# Patient Record
Sex: MALE | Race: WHITE | NOT HISPANIC OR LATINO | Employment: FULL TIME | URBAN - METROPOLITAN AREA
[De-identification: names, ages, dates, MRNs, and addresses within clinical notes are randomized per-mention and may not be internally consistent; named-entity substitution may affect disease eponyms.]

---

## 2017-04-17 DIAGNOSIS — E11.9 TYPE 2 DIABETES MELLITUS WITHOUT COMPLICATIONS (HCC): ICD-10-CM

## 2017-04-17 DIAGNOSIS — Z00.00 ENCOUNTER FOR GENERAL ADULT MEDICAL EXAMINATION WITHOUT ABNORMAL FINDINGS: ICD-10-CM

## 2018-01-12 NOTE — MISCELLANEOUS
Provider Comments  Provider Comments:   Our records indicate that you missed an appointment on 10/31/2016  If you have not done so already, please call our office and we will be happy to schedule another appointment for you  If you must cancel your appointment, our office policy requires you to call our office at least 24 hours in advance so that we may utilize your appointment time for another patient who requires our services  If you have any questions, please contact our office at [phone]  Signatures   Electronically signed by :  Pedro Moran, ; Nov 1 2016 12:13PM EST                       (Author)

## 2018-04-21 DIAGNOSIS — E11.9 TYPE 2 DIABETES MELLITUS WITHOUT COMPLICATION, WITHOUT LONG-TERM CURRENT USE OF INSULIN (HCC): Primary | ICD-10-CM

## 2018-07-10 DIAGNOSIS — E11.9 TYPE 2 DIABETES MELLITUS WITHOUT COMPLICATION, WITHOUT LONG-TERM CURRENT USE OF INSULIN (HCC): ICD-10-CM

## 2018-07-23 ENCOUNTER — OFFICE VISIT (OUTPATIENT)
Dept: FAMILY MEDICINE CLINIC | Facility: CLINIC | Age: 43
End: 2018-07-23
Payer: COMMERCIAL

## 2018-07-23 VITALS
SYSTOLIC BLOOD PRESSURE: 130 MMHG | HEART RATE: 80 BPM | WEIGHT: 194 LBS | RESPIRATION RATE: 16 BRPM | DIASTOLIC BLOOD PRESSURE: 82 MMHG | BODY MASS INDEX: 29 KG/M2

## 2018-07-23 DIAGNOSIS — E11.9 TYPE 2 DIABETES MELLITUS WITHOUT COMPLICATION, WITHOUT LONG-TERM CURRENT USE OF INSULIN (HCC): Primary | Chronic | ICD-10-CM

## 2018-07-23 DIAGNOSIS — Z72.0 TOBACCO ABUSE: Chronic | ICD-10-CM

## 2018-07-23 PROCEDURE — 99214 OFFICE O/P EST MOD 30 MIN: CPT | Performed by: FAMILY MEDICINE

## 2018-07-23 NOTE — PROGRESS NOTES
Assessment/Plan:    No problem-specific Assessment & Plan notes found for this encounter  Diagnoses and all orders for this visit:    Type 2 diabetes mellitus without complication, without long-term current use of insulin ([facility] [address] )  Comments:  Staci Goins is to get back to a low carb diet & regular exercise, and is f/u regularly (long discussion today)  Will see where A1c comes back at before adjusting meds  Orders:  -     Comprehensive metabolic panel; Future  -     Lipid Panel with Direct LDL reflex; Future  -     HEMOGLOBIN A1C W/ EAG ESTIMATION; Future  -     Microalbumin / creatinine urine ratio  -     CBC and differential; Future    Tobacco abuse  Comments:  Pt has really cut down, but was encouraged to completely stop  Discussed options available to help quit smoking  Subjective:      Patient ID: Lisbeth Mariscal is a 40 y o  male  Staci Goins has been a diabetic now for over 2 years  He unfortunately, has not been back to see us, in almost 2 years  Had a blood sugar in clinic of 499 -> was drinking Gatorade and Ice Tea today  Is taking his Metformin regularly  Not really watching his diet -> trying, as wife is working on her diet too  Has greatly cut back on his smoking  +FamHx - Father and PGM            The following portions of the patient's history were reviewed and updated as appropriate: allergies, current medications, past family history, past social history, past surgical history and problem list     Past Medical History:   Diagnosis Date    Diabetes mellitus ([facility] [address] )        Current Outpatient Prescriptions:     metFORMIN (GLUCOPHAGE) 500 mg tablet, Take 1 tablet (500 mg total) by mouth 2 (two) times a day with meals for 90 days, Disp: 180 tablet, Rfl: 1    No Known Allergies    Social History   Substance Use Topics    Smoking status: Current Every Day Smoker     Packs/day: 0 50     Types: Cigarettes    Smokeless tobacco: Not on file    Alcohol use No      Comment: [employer]--Social Alcohol Review of Systems   Constitutional: Positive for fatigue  Negative for activity change  Eyes: Negative for visual disturbance  Respiratory: Negative for shortness of breath  Cardiovascular: Negative for chest pain  Endocrine: Positive for polyuria  Symptoms not bad when taking his medicine regularly  Objective:      /82 (BP Location: Left arm, Patient Position: Sitting, Cuff Size: Standard)   Pulse 80   Resp 16   Wt 88 kg (194 lb)   BMI 29 00 kg/m²          Physical Exam   Constitutional: He is oriented to person, place, and time  He appears well-developed and well-nourished  No distress  HENT:   Head: Normocephalic and atraumatic  Mouth/Throat: Oropharynx is clear and moist  No oropharyngeal exudate  Eyes: Conjunctivae are normal    Neck: Normal range of motion  Neck supple  No thyromegaly present  Cardiovascular: Normal rate, regular rhythm and normal heart sounds  Exam reveals no gallop and no friction rub  No murmur heard  Pulmonary/Chest: Effort normal and breath sounds normal  No respiratory distress  He has no wheezes  He has no rales  Lymphadenopathy:     He has no cervical adenopathy  Neurological: He is alert and oriented to person, place, and time  Skin: He is not diaphoretic  Psychiatric: He has a normal mood and affect  His behavior is normal  Judgment and thought content normal    Nursing note and vitals reviewed

## 2018-07-28 ENCOUNTER — APPOINTMENT (OUTPATIENT)
Dept: LAB | Facility: CLINIC | Age: 43
End: 2018-07-28
Payer: COMMERCIAL

## 2018-07-28 DIAGNOSIS — E11.9 TYPE 2 DIABETES MELLITUS WITHOUT COMPLICATION, WITHOUT LONG-TERM CURRENT USE OF INSULIN (HCC): Primary | Chronic | ICD-10-CM

## 2018-07-28 DIAGNOSIS — E11.9 TYPE 2 DIABETES MELLITUS WITHOUT COMPLICATION, WITHOUT LONG-TERM CURRENT USE OF INSULIN (HCC): Chronic | ICD-10-CM

## 2018-07-28 DIAGNOSIS — E78.2 MIXED HYPERLIPIDEMIA: Chronic | ICD-10-CM

## 2018-07-28 LAB
ALBUMIN SERPL BCP-MCNC: 3.6 G/DL (ref 3.5–5)
ALP SERPL-CCNC: 81 U/L (ref 46–116)
ALT SERPL W P-5'-P-CCNC: 33 U/L (ref 12–78)
ANION GAP SERPL CALCULATED.3IONS-SCNC: 5 MMOL/L (ref 4–13)
AST SERPL W P-5'-P-CCNC: 13 U/L (ref 5–45)
BASOPHILS # BLD AUTO: 0.04 THOUSANDS/ΜL (ref 0–0.1)
BASOPHILS NFR BLD AUTO: 1 % (ref 0–1)
BILIRUB SERPL-MCNC: 0.4 MG/DL (ref 0.2–1)
BUN SERPL-MCNC: 11 MG/DL (ref 5–25)
CALCIUM SERPL-MCNC: 9 MG/DL (ref 8.3–10.1)
CHLORIDE SERPL-SCNC: 98 MMOL/L (ref 100–108)
CHOLEST SERPL-MCNC: 304 MG/DL (ref 50–200)
CO2 SERPL-SCNC: 30 MMOL/L (ref 21–32)
CREAT SERPL-MCNC: 0.84 MG/DL (ref 0.6–1.3)
CREAT UR-MCNC: 136 MG/DL
EOSINOPHIL # BLD AUTO: 0.13 THOUSAND/ΜL (ref 0–0.61)
EOSINOPHIL NFR BLD AUTO: 2 % (ref 0–6)
ERYTHROCYTE [DISTWIDTH] IN BLOOD BY AUTOMATED COUNT: 12 % (ref 11.6–15.1)
EST. AVERAGE GLUCOSE BLD GHB EST-MCNC: 295 MG/DL
GFR SERPL CREATININE-BSD FRML MDRD: 109 ML/MIN/1.73SQ M
GLUCOSE P FAST SERPL-MCNC: 248 MG/DL (ref 65–99)
HBA1C MFR BLD: 11.9 % (ref 4.2–6.3)
HCT VFR BLD AUTO: 42.7 % (ref 36.5–49.3)
HDLC SERPL-MCNC: 50 MG/DL (ref 40–60)
HGB BLD-MCNC: 15.8 G/DL (ref 12–17)
LDLC SERPL CALC-MCNC: 209 MG/DL (ref 0–100)
LYMPHOCYTES # BLD AUTO: 1.93 THOUSANDS/ΜL (ref 0.6–4.47)
LYMPHOCYTES NFR BLD AUTO: 35 % (ref 14–44)
MCH RBC QN AUTO: 31.3 PG (ref 26.8–34.3)
MCHC RBC AUTO-ENTMCNC: 37 G/DL (ref 31.4–37.4)
MCV RBC AUTO: 85 FL (ref 82–98)
MICROALBUMIN UR-MCNC: 10.7 MG/L (ref 0–20)
MICROALBUMIN/CREAT 24H UR: 8 MG/G CREATININE (ref 0–30)
MONOCYTES # BLD AUTO: 0.47 THOUSAND/ΜL (ref 0.17–1.22)
MONOCYTES NFR BLD AUTO: 8 % (ref 4–12)
NEUTROPHILS # BLD AUTO: 3 THOUSANDS/ΜL (ref 1.85–7.62)
NEUTS SEG NFR BLD AUTO: 54 % (ref 43–75)
PLATELET # BLD AUTO: 248 THOUSANDS/UL (ref 149–390)
PMV BLD AUTO: 10 FL (ref 8.9–12.7)
POTASSIUM SERPL-SCNC: 4.1 MMOL/L (ref 3.5–5.3)
PROT SERPL-MCNC: 7.5 G/DL (ref 6.4–8.2)
RBC # BLD AUTO: 5.05 MILLION/UL (ref 3.88–5.62)
SODIUM SERPL-SCNC: 133 MMOL/L (ref 136–145)
TRIGL SERPL-MCNC: 227 MG/DL
WBC # BLD AUTO: 5.57 THOUSAND/UL (ref 4.31–10.16)

## 2018-07-28 PROCEDURE — 36415 COLL VENOUS BLD VENIPUNCTURE: CPT

## 2018-07-28 PROCEDURE — 82043 UR ALBUMIN QUANTITATIVE: CPT | Performed by: FAMILY MEDICINE

## 2018-07-28 PROCEDURE — 80053 COMPREHEN METABOLIC PANEL: CPT

## 2018-07-28 PROCEDURE — 82570 ASSAY OF URINE CREATININE: CPT | Performed by: FAMILY MEDICINE

## 2018-07-28 PROCEDURE — 83036 HEMOGLOBIN GLYCOSYLATED A1C: CPT

## 2018-07-28 PROCEDURE — 80061 LIPID PANEL: CPT

## 2018-07-28 PROCEDURE — 85025 COMPLETE CBC W/AUTO DIFF WBC: CPT

## 2018-07-28 PROCEDURE — 3061F NEG MICROALBUMINURIA REV: CPT | Performed by: FAMILY MEDICINE

## 2018-07-28 RX ORDER — ATORVASTATIN CALCIUM 10 MG/1
10 TABLET, FILM COATED ORAL
Qty: 90 TABLET | Refills: 1 | Status: SHIPPED | OUTPATIENT
Start: 2018-07-28 | End: 2019-01-17 | Stop reason: SDUPTHER

## 2019-01-15 ENCOUNTER — HOSPITAL ENCOUNTER (EMERGENCY)
Facility: HOSPITAL | Age: 44
Discharge: HOME | End: 2019-01-15
Attending: EMERGENCY MEDICINE | Admitting: EMERGENCY MEDICINE
Payer: COMMERCIAL

## 2019-01-15 ENCOUNTER — APPOINTMENT (EMERGENCY)
Dept: RADIOLOGY | Facility: HOSPITAL | Age: 44
End: 2019-01-15
Payer: COMMERCIAL

## 2019-01-15 VITALS
OXYGEN SATURATION: 98 % | SYSTOLIC BLOOD PRESSURE: 127 MMHG | WEIGHT: 191 LBS | TEMPERATURE: 97.2 F | RESPIRATION RATE: 18 BRPM | HEART RATE: 85 BPM | DIASTOLIC BLOOD PRESSURE: 81 MMHG | HEIGHT: 69 IN | BODY MASS INDEX: 28.29 KG/M2

## 2019-01-15 DIAGNOSIS — IMO0002 UNCONTROLLED DIABETES MELLITUS: Primary | ICD-10-CM

## 2019-01-15 DIAGNOSIS — R73.9 HYPERGLYCEMIA: ICD-10-CM

## 2019-01-15 DIAGNOSIS — H60.01 ABSCESS OF EXTERNAL EAR, RIGHT: ICD-10-CM

## 2019-01-15 LAB
ALBUMIN SERPL BCP-MCNC: 4.1 G/DL (ref 3.5–5)
ALP SERPL-CCNC: 106 U/L (ref 46–116)
ALT SERPL W P-5'-P-CCNC: 32 U/L (ref 12–78)
ANION GAP SERPL CALCULATED.3IONS-SCNC: 8 MMOL/L (ref 4–13)
AST SERPL W P-5'-P-CCNC: 14 U/L (ref 5–45)
BACTERIA UR QL AUTO: NORMAL /HPF
BASOPHILS # BLD AUTO: 0.04 THOUSANDS/ΜL (ref 0–0.1)
BASOPHILS NFR BLD AUTO: 1 % (ref 0–1)
BILIRUB SERPL-MCNC: 0.37 MG/DL (ref 0.2–1)
BILIRUB UR QL STRIP: NEGATIVE
BUN SERPL-MCNC: 11 MG/DL (ref 5–25)
CALCIUM SERPL-MCNC: 9.7 MG/DL (ref 8.3–10.1)
CHLORIDE SERPL-SCNC: 93 MMOL/L (ref 100–108)
CLARITY UR: CLEAR
CLARITY, POC: CLEAR
CO2 SERPL-SCNC: 29 MMOL/L (ref 21–32)
COLOR UR: YELLOW
COLOR, POC: YELLOW
CREAT SERPL-MCNC: 1.01 MG/DL (ref 0.6–1.3)
EOSINOPHIL # BLD AUTO: 0.07 THOUSAND/ΜL (ref 0–0.61)
EOSINOPHIL NFR BLD AUTO: 1 % (ref 0–6)
ERYTHROCYTE [DISTWIDTH] IN BLOOD BY AUTOMATED COUNT: 11.7 % (ref 11.6–15.1)
GFR SERPL CREATININE-BSD FRML MDRD: 91 ML/MIN/1.73SQ M
GLUCOSE SERPL-MCNC: 274 MG/DL (ref 65–140)
GLUCOSE SERPL-MCNC: 421 MG/DL (ref 65–140)
GLUCOSE UR STRIP-MCNC: ABNORMAL MG/DL
HCT VFR BLD AUTO: 48.1 % (ref 36.5–49.3)
HGB BLD-MCNC: 17.2 G/DL (ref 12–17)
HGB UR QL STRIP.AUTO: NEGATIVE
HYALINE CASTS #/AREA URNS LPF: NORMAL /LPF
IMM GRANULOCYTES # BLD AUTO: 0 THOUSAND/UL (ref 0–0.2)
IMM GRANULOCYTES NFR BLD AUTO: 0 % (ref 0–2)
KETONES UR STRIP-MCNC: NEGATIVE MG/DL
LEUKOCYTE ESTERASE UR QL STRIP: ABNORMAL
LIPASE SERPL-CCNC: 106 U/L (ref 73–393)
LYMPHOCYTES # BLD AUTO: 1.82 THOUSANDS/ΜL (ref 0.6–4.47)
LYMPHOCYTES NFR BLD AUTO: 30 % (ref 14–44)
MCH RBC QN AUTO: 31.3 PG (ref 26.8–34.3)
MCHC RBC AUTO-ENTMCNC: 35.8 G/DL (ref 31.4–37.4)
MCV RBC AUTO: 88 FL (ref 82–98)
MONOCYTES # BLD AUTO: 0.47 THOUSAND/ΜL (ref 0.17–1.22)
MONOCYTES NFR BLD AUTO: 8 % (ref 4–12)
NEUTROPHILS # BLD AUTO: 3.71 THOUSANDS/ΜL (ref 1.85–7.62)
NEUTS SEG NFR BLD AUTO: 60 % (ref 43–75)
NITRITE UR QL STRIP: NEGATIVE
NON-SQ EPI CELLS URNS QL MICRO: NORMAL /HPF
NRBC BLD AUTO-RTO: 0 /100 WBCS
PH UR STRIP.AUTO: 6 [PH] (ref 4.5–8)
PLATELET # BLD AUTO: 271 THOUSANDS/UL (ref 149–390)
PMV BLD AUTO: 10.2 FL (ref 8.9–12.7)
POTASSIUM SERPL-SCNC: 4.7 MMOL/L (ref 3.5–5.3)
PROT SERPL-MCNC: 8.3 G/DL (ref 6.4–8.2)
PROT UR STRIP-MCNC: NEGATIVE MG/DL
RBC # BLD AUTO: 5.5 MILLION/UL (ref 3.88–5.62)
RBC #/AREA URNS AUTO: NORMAL /HPF
SODIUM SERPL-SCNC: 130 MMOL/L (ref 136–145)
SP GR UR STRIP.AUTO: 1.01 (ref 1–1.03)
TROPONIN I SERPL-MCNC: <0.02 NG/ML
UROBILINOGEN UR QL STRIP.AUTO: 0.2 E.U./DL
WBC # BLD AUTO: 6.11 THOUSAND/UL (ref 4.31–10.16)
WBC #/AREA URNS AUTO: NORMAL /HPF

## 2019-01-15 PROCEDURE — 99285 EMERGENCY DEPT VISIT HI MDM: CPT

## 2019-01-15 PROCEDURE — 96374 THER/PROPH/DIAG INJ IV PUSH: CPT

## 2019-01-15 PROCEDURE — 96361 HYDRATE IV INFUSION ADD-ON: CPT

## 2019-01-15 PROCEDURE — 71046 X-RAY EXAM CHEST 2 VIEWS: CPT

## 2019-01-15 PROCEDURE — 82948 REAGENT STRIP/BLOOD GLUCOSE: CPT

## 2019-01-15 PROCEDURE — 81001 URINALYSIS AUTO W/SCOPE: CPT

## 2019-01-15 PROCEDURE — 83690 ASSAY OF LIPASE: CPT | Performed by: EMERGENCY MEDICINE

## 2019-01-15 PROCEDURE — 84484 ASSAY OF TROPONIN QUANT: CPT | Performed by: EMERGENCY MEDICINE

## 2019-01-15 PROCEDURE — 36415 COLL VENOUS BLD VENIPUNCTURE: CPT | Performed by: EMERGENCY MEDICINE

## 2019-01-15 PROCEDURE — 85025 COMPLETE CBC W/AUTO DIFF WBC: CPT | Performed by: EMERGENCY MEDICINE

## 2019-01-15 PROCEDURE — 80053 COMPREHEN METABOLIC PANEL: CPT | Performed by: EMERGENCY MEDICINE

## 2019-01-15 RX ORDER — KETOROLAC TROMETHAMINE 30 MG/ML
15 INJECTION, SOLUTION INTRAMUSCULAR; INTRAVENOUS ONCE
Status: COMPLETED | OUTPATIENT
Start: 2019-01-15 | End: 2019-01-15

## 2019-01-15 RX ORDER — ONDANSETRON 2 MG/ML
4 INJECTION INTRAMUSCULAR; INTRAVENOUS ONCE
Status: DISCONTINUED | OUTPATIENT
Start: 2019-01-15 | End: 2019-01-15 | Stop reason: HOSPADM

## 2019-01-15 RX ORDER — SULFAMETHOXAZOLE AND TRIMETHOPRIM 800; 160 MG/1; MG/1
1 TABLET ORAL 2 TIMES DAILY
Qty: 14 TABLET | Refills: 0 | Status: SHIPPED | OUTPATIENT
Start: 2019-01-15 | End: 2019-01-22

## 2019-01-15 RX ORDER — LIDOCAINE HYDROCHLORIDE AND EPINEPHRINE 10; 10 MG/ML; UG/ML
5 INJECTION, SOLUTION INFILTRATION; PERINEURAL ONCE
Status: COMPLETED | OUTPATIENT
Start: 2019-01-15 | End: 2019-01-15

## 2019-01-15 RX ADMIN — SODIUM CHLORIDE 2000 ML: 0.9 INJECTION, SOLUTION INTRAVENOUS at 09:54

## 2019-01-15 RX ADMIN — KETOROLAC TROMETHAMINE 15 MG: 30 INJECTION, SOLUTION INTRAMUSCULAR at 09:55

## 2019-01-15 RX ADMIN — LIDOCAINE HYDROCHLORIDE,EPINEPHRINE BITARTRATE 5 ML: 10; .01 INJECTION, SOLUTION INFILTRATION; PERINEURAL at 13:00

## 2019-01-15 NOTE — ED NOTES
Patient's ex girlfriend concerned about patient's mental health status, stating that he has been expressing thoughts of suicide recently  Dr Benny Taylor aware       Crispin Guo, RN  01/15/19 [address] [place] [address], RN  01/15/19 2606

## 2019-01-15 NOTE — ED ATTENDING ATTESTATION
Farnaz Morocho MD, saw and evaluated the patient  I have discussed the patient with the resident/non-physician practitioner and agree with the resident's/non-physician practitioner's findings, Plan of Care, and MDM as documented in the resident's/non-physician practitioner's note, except where noted  All available labs and Radiology studies were reviewed  At this point I agree with the current assessment done in the Emergency Department  I have conducted an independent evaluation of this patient a history and physical is as follows:    Patient presents the emergency department with a chief complaint of 3 days of generalized weakness  The patient reports that over that time  He has had felt generally tired, fatigued, and weak  The patient also admits to mild lightheadedness  The patient reports he does have urinary frequency but it has not changed recently  The patient reports that he has had urinary frequency ever since he was diagnosed being diabetic  The patient reports that he did have mild nausea this morning and he retched once or twice but has had no episodes of vomiting  The patient had a normal day of work yesterday but he did feel more tired than usual   The patient denies any chest pain  The patient denies back pain or chest heaviness  There has been no neck or jaw pain  The patient denies arm pain  The patient reports that he has had a tender area inside his right ear for the last 1-2 days  The patient denies any fevers  The patient does not take his blood sugar at home and is inconsistent in taking his oral hypoglycemic  Physical exam demonstrates a pleasant alert nontoxic male in no acute distress  HEENT exam demonstrated a small slightly fluctuant area at the entrance to the right external auditory canal   The ear was otherwise normal   There is no surrounding erythema or warmth  There is no tenderness to the pinna    The left ear was normal   Eyes ears nose and throat were normal except for dry oral mucosa  The neck was supple nontender  Lungs are clear with equal breath sounds  The heart had a regular rate rhythm  The abdomen is soft and nontender  All extremities are nontender with a full range of motion  There is no focal neurologic deficit      Critical Care Time  CritCare Time    Procedures

## 2019-01-15 NOTE — DISCHARGE INSTRUCTIONS
Abscess   WHAT YOU NEED TO KNOW:   A warm compress may help your abscess drain  Your healthcare provider may make a cut in the abscess so it can drain  You may need surgery to remove an abscess that is on your hands or buttocks  DISCHARGE INSTRUCTIONS:   Return to the emergency department if:   · The area around your abscess becomes very painful, warm, or has red streaks  · You have a fever and chills  · Your heart is beating faster than usual      · You feel faint or confused  Contact your healthcare provider if:   · Your abscess gets bigger or does not get better  · Your abscess returns  · You have questions or concerns about your condition or care  Medicines: You may  need any of the following:  · Antibiotics  help treat a bacterial infection  · Acetaminophen  decreases pain and fever  It is available without a doctor's order  Ask how much to take and how often to take it  Follow directions  Acetaminophen can cause liver damage if not taken correctly  · NSAIDs , such as ibuprofen, help decrease swelling, pain, and fever  This medicine is available with or without a doctor's order  NSAIDs can cause stomach bleeding or kidney problems in certain people  If you take blood thinner medicine, always ask your healthcare provider if NSAIDs are safe for you  Always read the medicine label and follow directions  · Take your medicine as directed  Contact your healthcare provider if you think your medicine is not helping or if you have side effects  Tell him or her if you are allergic to any medicine  Keep a list of the medicines, vitamins, and herbs you take  Include the amounts, and when and why you take them  Bring the list or the pill bottles to follow-up visits  Carry your medicine list with you in case of an emergency  Self-care:   · Apply a warm compress to your abscess  This will help it open and drain  Wet a washcloth in warm, but not hot, water  Apply the compress for 10 minutes  Repeat this 4 times each day  Do not  press on an abscess or try to open it with a needle  You may push the bacteria deeper or into your blood  · Do not share your clothes, towels, or sheets with anyone  This can spread the infection to others  · Wash your hands often  This can help prevent the spread of germs  Use soap and water or an alcohol-based hand rub  Care for your wound after it is drained:   · Care for your wound as directed  If your healthcare provider says it is okay, carefully remove the bandage and gauze packing  You may need to soak the gauze to get it out of your wound  Clean your wound and the area around it as directed  Dry the area and put on new, clean bandages  Change your bandages when they get wet or dirty  · Ask your healthcare provider how to change the gauze in your wound  Keep track of how many pieces of gauze are placed inside the wound  Do not put too much packing in the wound  Do not pack the gauze too tightly in your wound  Follow up with your healthcare provider in 1 to 3 days: You may need to have your packing removed or your bandage changed  Write down your questions so you remember to ask them during your visits  © 2017 [address] [facility] Information is for End User's use only and may not be sold, redistributed or otherwise used for commercial purposes  All illustrations and images included in CareNotes® are the copyrighted property of [employer]  or [employer]  The above information is an  only  It is not intended as medical advice for individual conditions or treatments  Talk to your doctor, nurse or pharmacist before following any medical regimen to see if it is safe and effective for you  Diabetes in the Older Adult   WHAT YOU NEED TO KNOW:   Older adults with diabetes are at risk for heart disease, stroke, kidney disease, blindness, and nerve damage   You may also be at risk for any of the following:  · Poor nutrition or low blood sugar levels    · Confusion or problems with memory, attention, or learning new things    · Trouble controlling urination or frequent urinary tract infections    · Trouble with coordination or balance    · Falls and injuries    · Pain    · Depression    · Open sores on your legs or feet  DISCHARGE INSTRUCTIONS:   Call 911 for any of the following:   · You have any of the following signs of a stroke:      ¨ Numbness or drooping on one side of your face     ¨ Weakness in an arm or leg    ¨ Confusion or difficulty speaking    ¨ Dizziness, a severe headache, or vision loss    · You have any of the following signs of a heart attack:      ¨ Squeezing, pressure, or pain in your chest that lasts longer than 5 minutes or returns    ¨ Discomfort or pain in your back, neck, jaw, stomach, or arm     ¨ Trouble breathing    ¨ Nausea or vomiting    ¨ Lightheadedness or a sudden cold sweat, especially with chest pain or trouble breathing  Return to the emergency department if:   · You have severe abdominal pain, or the pain spreads to your back  You may also be vomiting  · You have trouble staying awake or focusing  · You are shaking or sweating  · You have blurred or double vision  · Your breath has a fruity, sweet smell  · Your breathing is deep and labored, or rapid and shallow  · Your heartbeat is fast and weak  · You fall and get hurt  Contact your healthcare provider if:   · You are vomiting or have diarrhea  · You have an upset stomach and cannot eat the foods on your meal plan  · You feel weak or more tired than usual      · You feel dizzy, have headaches, or are easily irritated  · Your skin is red, warm, dry, or swollen  · You have a wound that does not heal      · You have numbness in your arms or legs  · You have trouble coping with your illness, or you feel anxious or depressed  · You have problems with your memory       · You have changes in your vision  · You have questions or concerns about your condition or care  Medicines  may be given to decrease the amount of sugar in your blood  You may also need medicine to lower your blood pressure or cholesterol, or medicine to prevent blood clots  Manage the ABCs and prevent problems caused by diabetes:   · Check your blood sugar levels as directed  Your healthcare provider will tell you when and how often to check during the day  Your healthcare provider will also tell you what your blood sugar levels should be before and after a meal  You may need to check for ketones in your urine or blood if your level is higher than directed  Write down your results and show them to your healthcare provider  Your provider may use the results to make changes to your medicine, food, or exercise schedules  Ask your healthcare provider for more information about how to treat a high or low blood sugar level  · Follow your meal plan as directed  A dietitian will help you make a meal plan to keep your blood sugar level steady and make sure you get enough nutrition  Do not skip meals  Your blood sugar level may drop too low if you have taken diabetes medicine and do not eat  Ask your healthcare provider about programs in your community that can deliver the meals to your home  · Try to be active for 30 to 60 minutes most days of the week  Exercise can help keep your blood sugar level steady, decrease your risk of heart disease, and help you lose weight  It can also help improve your balance and decrease your risk for falls  Work with your healthcare provider to create an exercise plan  Ask a family member or friend to exercise with you  Start slow and exercise for 5 to 10 minutes at a time  Examples of activities include walking or swimming  Include muscle strengthening activities 2 to 3 days each week  Include balance training 2 to 3 times each week   Activities that help increase balance include yoga and wilfredo chi      · Maintain a healthy weight  Ask your healthcare provider how much you should weigh  A healthy weight can help you control your diabetes and prevent heart disease  Ask your provider to help you create a weight loss plan if you are overweight  Together you can set manageable weight loss goals  · Do not smoke  Ask your healthcare provider for information if you currently smoke and need help to quit  Do not use e-cigarettes or smokeless tobacco in place of cigarettes or to help you quit  They still contain nicotine  · Manage stress  Stress may increase your blood sugar level  Deep breathing, muscle relaxation, and music may help you relax  Ask your healthcare provider for more information about these practices  Other ways to manage your diabetes:   · Check your feet every day for sores  Look at your whole foot, including the bottom, and between and under your toes  Check for wounds, corns, and calluses  Use a mirror to see the bottom of your feet  The skin on your feet may be shiny, tight, dry, or darker than normal  Your feet may also be cold and pale  Feel your feet by running your hands along the tops, bottoms, sides, and between your toes  Redness, swelling, and warmth are signs of blood flow problems that can lead to a foot ulcer  Do not try to remove corns or calluses yourself  · Wear medical alert identification  Wear medical alert jewelry or carry a card that says you have diabetes  Ask your healthcare provider where to get these items  · Ask about vaccines  You have a higher risk for serious illness if you get the flu, pneumonia, or hepatitis  Ask your healthcare provider if you should get a flu, pneumonia, shingles, or hepatitis B vaccine, and when to get the vaccine  · Keep all appointments  You may need to return to have your A1c checked every 3 months  You will need to return at least once each year to have your feet checked   You will need an eye exam once a year to check for retinopathy  You will also need urine tests every year to check for kidney problems  You may need tests to monitor for heart disease  Write down your questions so you remember to ask them during your visits  · Get help from family and friends  You may need help checking your blood sugar level, giving insulin injections, or preparing your meals  Ask your family and friends to help you with these tasks  Talk to your healthcare provider if you do not have someone at home to help you  A healthcare provider can come to your home to help you with these tasks  Follow up with your healthcare provider as directed: You may need to return to have your A1c checked every 3 months  You will need to return at least once each year to have your feet checked  You will need an eye exam once a year to check for retinopathy  You will also need urine tests every year to check for kidney problems  You may need tests to monitor for heart disease  Write down your questions so you remember to ask them during your visits  © 2017 [address] [employer] Information is for End User's use only and may not be sold, redistributed or otherwise used for commercial purposes  All illustrations and images included in CareNotes® are the copyrighted property of [employer] , [employer]  or [employer]  The above information is an  only  It is not intended as medical advice for individual conditions or treatments  Talk to your doctor, nurse or pharmacist before following any medical regimen to see if it is safe and effective for you

## 2019-01-15 NOTE — ED NOTES
I approached patient informing him of his ex girlfriends concern and patient denies wanting to hurt himself, stating he "has his son" and would never do anything  I let him know that we can help him if he would like to talk  Patient states, "no, i'm o k , just have a lot of stress right now"       Tl Baldwin RN  01/15/19 [id number]

## 2019-01-15 NOTE — ED PROVIDER NOTES
History  Chief Complaint   Patient presents with    Weakness - Generalized     Pt presents with generalized weakness, nausea, dry heaves, and R ear pain since " a couple days ago"  HPI    81-year-old male with history of non insulin dependent diabetes presents with chief complaint of weakness  Patient was at work  He felt nauseated  He had some dry heaves with scant emesis, nonbloody nonbilious  Patient states he feels weak  Patient states that this began a couple days ago  Has not checked his blood sugar  Patient admits to right ear pain  Swelling of his external ext story auditory canal   Patient denies drainage  Patient denies erythema  Pain is described as dull and achy  It is currently 5/10  Worse with palpation  No changing in hearing  Patient states this pain does not radiate  Patient states he takes metformin does not check his blood glucose  Patient admits to feeling very fatigued  Patient was able to walk from work to the hospital for evaluation  Patient denies fever chills rigors headache lightheadedness dizziness chest pain palpitations shortness of breath cough pleurisy abdominal pain urinary symptoms motor weakness numbness and tingling  Prior to Admission Medications   Prescriptions Last Dose Informant Patient Reported? Taking?   atorvastatin (LIPITOR) 10 mg tablet   No No   Sig: Take 1 tablet (10 mg total) by mouth daily at bedtime for 90 days   metFORMIN (GLUCOPHAGE) 1000 MG tablet   No No   Sig: Take 1 tablet (1,000 mg total) by mouth 2 (two) times a day with meals for 90 days   sitaGLIPtin (JANUVIA) 100 mg tablet   No No   Sig: Take 1 tablet (100 mg total) by mouth daily for 90 days      Facility-Administered Medications: None       Past Medical History:   Diagnosis Date    Diabetes mellitus ([facility] [address] )        History reviewed  No pertinent surgical history      Family History   Problem Relation Age of Onset    Hypertension Mother     Diabetes Father      I have reviewed and agree with the history as documented  Social History   Substance Use Topics    Smoking status: Current Every Day Smoker     Packs/day: 0 50     Types: Cigarettes    Smokeless tobacco: Not on file    Alcohol use No      Comment: [employer]--Social Alcohol        Review of Systems   Constitutional: Positive for fatigue  Negative for activity change, appetite change, chills, diaphoresis, fever and unexpected weight change  HENT: Positive for ear pain  Negative for congestion, dental problem, drooling, ear discharge, facial swelling, hearing loss, mouth sores, nosebleeds, postnasal drip, rhinorrhea, sinus pain, sinus pressure, sneezing, sore throat, tinnitus and trouble swallowing  Psychiatric/Behavioral: Negative for agitation, behavioral problems, confusion, decreased concentration, dysphoric mood, hallucinations, self-injury, sleep disturbance and suicidal ideas  The patient is not nervous/anxious and is not hyperactive  All other systems reviewed and are negative  Physical Exam  ED Triage Vitals [01/15/19 0857]   Temperature Pulse Respirations Blood Pressure SpO2   (!) 97 2 °F (36 2 °C) (!) 109 18 137/87 95 %      Temp Source Heart Rate Source Patient Position - Orthostatic VS BP Location FiO2 (%)   Oral Monitor Sitting Right arm --      Pain Score       8           Orthostatic Vital Signs  Vitals:    01/15/19 1119 01/15/19 1130 01/15/19 1245 01/15/19 1318   BP: 143/87 143/87 116/80 127/81   Pulse: 85 96 82 85   Patient Position - Orthostatic VS: Lying  Sitting Lying       Physical Exam   Constitutional: He is oriented to person, place, and time  He appears well-developed and well-nourished  No distress  HENT:   Head: Normocephalic and atraumatic  Right Ear: Hearing, tympanic membrane, external ear and ear canal normal  No lacerations  There is swelling (External auditory canal shows an area of 1 cm of induration with with mild central fluctuance    There is no erythema there is no lymphangitis noted no crepitation  Cartilage is nontender ) and tenderness  No drainage  No foreign bodies  No mastoid tenderness  Tympanic membrane is not injected, not scarred, not perforated, not erythematous, not retracted and not bulging  Tympanic membrane mobility is normal  No middle ear effusion  No hemotympanum  No decreased hearing is noted  Left Ear: Hearing, tympanic membrane, external ear and ear canal normal  No lacerations  No drainage, swelling or tenderness  No foreign bodies  No mastoid tenderness  Tympanic membrane is not injected, not scarred, not perforated, not erythematous, not retracted and not bulging  Tympanic membrane mobility is normal   No middle ear effusion  No hemotympanum  No decreased hearing is noted  Nose: Nose normal  No mucosal edema or rhinorrhea  Right sinus exhibits no maxillary sinus tenderness and no frontal sinus tenderness  Left sinus exhibits no maxillary sinus tenderness and no frontal sinus tenderness  Mouth/Throat: Oropharynx is clear and moist  No oropharyngeal exudate  Eyes: Pupils are equal, round, and reactive to light  Conjunctivae and EOM are normal  Right eye exhibits no discharge  Left eye exhibits no discharge  No scleral icterus  Neck: Normal range of motion  Neck supple  No JVD present  No tracheal deviation present  No thyromegaly present  Cardiovascular: Normal rate, regular rhythm, normal heart sounds and intact distal pulses  No murmur heard  Pulmonary/Chest: Effort normal and breath sounds normal  No stridor  No respiratory distress  He has no wheezes  He has no rales  Abdominal: Soft  Bowel sounds are normal  He exhibits no distension and no mass  There is no tenderness  There is no rebound and no guarding  Musculoskeletal: Normal range of motion  He exhibits no edema, tenderness or deformity  Lymphadenopathy:     He has no cervical adenopathy  Neurological: He is alert and oriented to person, place, and time  No cranial nerve deficit   He exhibits normal muscle tone  Coordination normal    Skin: Skin is warm and dry  No rash noted  He is not diaphoretic  No erythema  Psychiatric: He has a normal mood and affect  His behavior is normal  Judgment and thought content normal  His mood appears not anxious  His affect is not angry, not blunt, not labile and not inappropriate  His speech is not rapid and/or pressured, not delayed, not tangential and not slurred  He is not agitated, not aggressive, not hyperactive, not slowed, not withdrawn, not actively hallucinating and not combative  Thought content is not paranoid and not delusional  Cognition and memory are not impaired  He does not express impulsivity or inappropriate judgment  He does not exhibit a depressed mood  He expresses no homicidal and no suicidal ideation  He expresses no suicidal plans and no homicidal plans  He is communicative  He exhibits normal recent memory and normal remote memory  He is attentive  Nursing note and vitals reviewed        ED Medications  Medications   ketorolac (TORADOL) injection 15 mg (15 mg Intravenous Given 1/15/19 0955)   sodium chloride 0 9 % bolus 2,000 mL (0 mL Intravenous Stopped 1/15/19 1240)   lidocaine-epinephrine (XYLOCAINE/EPINEPHRINE) 1 %-1:100,000 injection 5 mL (5 mL Infiltration Given 1/15/19 1300)       Diagnostic Studies  Results Reviewed     Procedure Component Value Units Date/Time    Fingerstick Glucose (POCT) [[id number]]  (Abnormal) Collected:  01/15/19 1237    Lab Status:  Final result Updated:  01/15/19 1238     POC Glucose 274 (H) mg/dl     Urine Microscopic [[id number]]  (Normal) Collected:  01/15/19 1124    Lab Status:  Final result Specimen:  Urine from Urine, Clean Catch Updated:  01/15/19 1151     RBC, UA None Seen /hpf      WBC, UA None Seen /hpf      Epithelial Cells None Seen /hpf      Bacteria, UA None Seen /hpf      Hyaline Casts, UA None Seen /lpf     POCT urinalysis dipstick [[id number]]  (Normal) Resulted:  01/15/19 1122    Lab Status:  Final result Updated:  01/15/19 1122     Color, UA yellow     Clarity, UA clear    ED Urine Macroscopic [[id number]]  (Abnormal) Collected:  01/15/19 1124    Lab Status:  Final result Specimen:  Urine Updated:  01/15/19 1122     Color, UA Yellow     Clarity, UA Clear     pH, UA 6 0     Leukocytes, UA Elevated glucose may cause decreased leukocyte values  See urine microscopic for [facility] result/ (A)     Nitrite, UA Negative     Protein, UA Negative mg/dl      Glucose, UA >=1000 (1%) (A) mg/dl      Ketones, UA Negative mg/dl      Urobilinogen, UA 0 2 E U /dl      Bilirubin, UA Negative     Blood, UA Negative     Specific Gravity, UA 1 015    Narrative:       CLINITEK RESULT    Troponin I [[id number]]  (Normal) Collected:  01/15/19 0938    Lab Status:  Final result Specimen:  Blood from Arm, Left Updated:  01/15/19 1010     Troponin I <0 02 ng/mL     Comprehensive metabolic panel [[id number]]  (Abnormal) Collected:  01/15/19 0938    Lab Status:  Final result Specimen:  Blood from Arm, Left Updated:  01/15/19 1010     Sodium 130 (L) mmol/L      Potassium 4 7 mmol/L      Chloride 93 (L) mmol/L      CO2 29 mmol/L      ANION GAP 8 mmol/L      BUN 11 mg/dL      Creatinine 1 01 mg/dL      Glucose 421 (H) mg/dL      Calcium 9 7 mg/dL      AST 14 U/L      ALT 32 U/L      Alkaline Phosphatase 106 U/L      Total Protein 8 3 (H) g/dL      Albumin 4 1 g/dL      Total Bilirubin 0 37 mg/dL      eGFR 91 ml/min/1 73sq m     Narrative:         National Kidney Disease Education Program recommendations are as follows:  GFR calculation is accurate only with a steady state creatinine  Chronic Kidney disease less than 60 ml/min/1 73 sq  meters  Kidney failure less than 15 ml/min/1 73 sq  meters      Lipase [[id number]]  (Normal) Collected:  01/15/19 0938    Lab Status:  Final result Specimen:  Blood from Arm, Left Updated:  01/15/19 1003     Lipase 106 u/L     CBC and differential [[id number]]  (Abnormal) Collected:  01/15/19 0938    Lab Status:  Final result Specimen:  Blood from Arm, Left Updated:  01/15/19 0947     WBC 6 11 Thousand/uL      RBC 5 50 Million/uL      Hemoglobin 17 2 (H) g/dL      Hematocrit 48 1 %      MCV 88 fL      MCH 31 3 pg      MCHC 35 8 g/dL      RDW 11 7 %      MPV 10 2 fL      Platelets 158 Thousands/uL      nRBC 0 /100 WBCs      Neutrophils Relative 60 %      Immat GRANS % 0 %      Lymphocytes Relative 30 %      Monocytes Relative 8 %      Eosinophils Relative 1 %      Basophils Relative 1 %      Neutrophils Absolute 3 71 Thousands/µL      Immature Grans Absolute 0 00 Thousand/uL      Lymphocytes Absolute 1 82 Thousands/µL      Monocytes Absolute 0 47 Thousand/µL      Eosinophils Absolute 0 07 Thousand/µL      Basophils Absolute 0 04 Thousands/µL                  XR chest 2 views   ED Interpretation by Edwige Marsh DO (01/15 4602)   Impression:        No acute cardiopulmonary disease  Final Result by Josemanuel Smith MD (01/15 5463)      No acute cardiopulmonary disease  Workstation performed: [id number]               Procedures  Incision/Drainage  Date/Time: 1/15/2019 10:07 AM  Performed by: Luz Elena Torre  Authorized by: Luz Elena Torre     Patient location:  ED  Other Assisting Provider: No    Consent:     Consent obtained:  Verbal    Consent given by:  Patient    Risks discussed:  Bleeding, damage to other organs, incomplete drainage, infection and pain    Alternatives discussed:  No treatment, delayed treatment, alternative treatment, observation and referral  Universal protocol:     Procedure explained and questions answered to patient or proxy's satisfaction: yes      Relevant documents present and verified: yes      Test results available and properly labeled: yes      Radiology Images displayed and confirmed    If images not available, report reviewed: yes      Required blood products, implants, devices, and special equipment available: yes      Site/side marked: yes      Immediately prior to procedure a time out was called: yes      Patient identity confirmed:  Verbally with patient and arm band  Location:     Type:  Abscess    Size:  1    Location:  Head/neck    Head/neck location:  R external auditory canal  Pre-procedure details:     Skin preparation:  Antiseptic wash  Anesthesia (see MAR for exact dosages): Anesthesia method:  Local infiltration    Local anesthetic:  Lidocaine 1% WITH epi  Procedure details:     Complexity:  Simple    Needle aspiration: yes      Needle size:  18 G    Incision depth:  Skin    Drainage:  Purulent (1 5 cc)    Drainage amount:  Scant    Wound treatment:  Wound left open    Packing materials:  None  Post-procedure details:     Patient tolerance of procedure: Tolerated well, no immediate complications          Phone Consults  ED Phone Contact    ED Course  ED Course as of Jan 16 0533   Tue Kishore 15, 2019   1005 Dehydration Hemoglobin: (!) 17 2   1020 Pseudo hyponatremia Sodium: (!) 130                               MDM  Number of Diagnoses or Management Options  Abscess of external ear, right:   Hyperglycemia:   Uncontrolled diabetes mellitus [facility]):   Diagnosis management comments: 42-year-old male presenting with feeling fatigued  Patient also has right ear pain  On exam tachycardia noted  Patient appears clinically dehydrated  Patient has a small right external auditory canal abscess  Lab work shows hyperglycemia with no signs of DK A  Suspect right ear abscess  No signs of malignant otitis externa  No signs of otitis media  No signs of mastoiditis  Patient given fluids Toradol  Patient felt better  I and D to right ear abscess refer to procedure note  Of note girlfriend called and stated the patient was feeling depressed and was having thoughts of suicide  S the patient multiple times about these thoughts  Patient states he is feeling down about work but has no SI/HI auditory visual hallucinations  Patient does not have access to firearms  Becca Ozuna Offered patient social work evaluation by crisis  Patient denies this and does not want this  Patient will follow up with PCP in next 1-2 days  ED return precautions discussed  She demonstrates understanding  Work note given  CritCare Time    Disposition  Final diagnoses:   Uncontrolled diabetes mellitus ([address] )   Abscess of external ear, right   Hyperglycemia     Time reflects when diagnosis was documented in both MDM as applicable and the Disposition within this note     Time User Action Codes Description Comment    1/15/2019  1:22 PM Radha Gutierrez Add [E11 65] Uncontrolled diabetes mellitus ([address] )     1/15/2019  1:22 PM Tori Blank Add [H60 01] Abscess of external ear, right     1/15/2019  1:22 PM Tori Blank Add [R73 9] Hyperglycemia       ED Disposition     ED Disposition Condition Comment    Discharge  Ova Rouge discharge to home/self care  Condition at discharge: Good    Return precautions were discussed with patient  Patient understands when to return to  Emergency department  Patient agrees to discharge plan and follow up care             Follow-up Information     Follow up With Specialties Details Why Contact Info Additional Information    Ellis Joseph, DO Family Medicine Go in 2 days for hyperglycemia  [address] [id number]       [facility] Emergency Department Emergency Medicine Go to As needed, If symptoms worsen [address]  [phone]  ED, [address] [place] [address], Psychiatric hospital          Discharge Medication List as of 1/15/2019  1:23 PM      START taking these medications    Details   sulfamethoxazole-trimethoprim (BACTRIM DS) 800-160 mg per tablet Take 1 tablet by mouth 2 (two) times a day for 7 days smx-tmp DS (BACTRIM) 800-160 mg tabs (1tab q12 D10), Starting Tue 1/15/2019, Until Tue 1/22/2019, Print         CONTINUE these medications which have NOT CHANGED    Details atorvastatin (LIPITOR) 10 mg tablet Take 1 tablet (10 mg total) by mouth daily at bedtime for 90 days, Starting Sat 7/28/2018, Until Fri 10/26/2018, Normal      metFORMIN (GLUCOPHAGE) 1000 MG tablet Take 1 tablet (1,000 mg total) by mouth 2 (two) times a day with meals for 90 days, Starting Sat 7/28/2018, Until Fri 10/26/2018, Normal      sitaGLIPtin (JANUVIA) 100 mg tablet Take 1 tablet (100 mg total) by mouth daily for 90 days, Starting Sat 7/28/2018, Until Fri 10/26/2018, Normal           No discharge procedures on file  ED Provider  Attending physically available and evaluated Ova Román FLORES managed the patient along with the ED Attending      Electronically Signed by         Emilee Celis DO  01/16/19 8957

## 2019-01-15 NOTE — ED NOTES
2nd liter of NSS running well  No distress noted  Pt aware we will need a sugar check after fluids are complete        Mini Emerson RN  39/86/73 0771

## 2019-01-15 NOTE — ED NOTES
Pt states that he is feeling better  Dr Segundo Felty aware   Pt updated on plan of care     Shayna Chavez, RN  01/15/19 3732

## 2019-01-17 ENCOUNTER — OFFICE VISIT (OUTPATIENT)
Dept: FAMILY MEDICINE CLINIC | Facility: CLINIC | Age: 44
End: 2019-01-17
Payer: COMMERCIAL

## 2019-01-17 VITALS
TEMPERATURE: 97.3 F | HEART RATE: 111 BPM | OXYGEN SATURATION: 97 % | WEIGHT: 184.8 LBS | HEIGHT: 69 IN | BODY MASS INDEX: 27.37 KG/M2 | SYSTOLIC BLOOD PRESSURE: 118 MMHG | DIASTOLIC BLOOD PRESSURE: 80 MMHG

## 2019-01-17 DIAGNOSIS — F43.0 ACUTE STRESS REACTION: ICD-10-CM

## 2019-01-17 DIAGNOSIS — E78.2 MIXED HYPERLIPIDEMIA: Chronic | ICD-10-CM

## 2019-01-17 DIAGNOSIS — H60.91 RECURRENT OTITIS EXTERNA, RIGHT: ICD-10-CM

## 2019-01-17 DIAGNOSIS — E11.9 TYPE 2 DIABETES MELLITUS WITHOUT COMPLICATION, WITHOUT LONG-TERM CURRENT USE OF INSULIN (HCC): Primary | Chronic | ICD-10-CM

## 2019-01-17 PROCEDURE — 99214 OFFICE O/P EST MOD 30 MIN: CPT | Performed by: FAMILY MEDICINE

## 2019-01-17 PROCEDURE — 3008F BODY MASS INDEX DOCD: CPT | Performed by: FAMILY MEDICINE

## 2019-01-17 RX ORDER — ATORVASTATIN CALCIUM 10 MG/1
10 TABLET, FILM COATED ORAL
Qty: 90 TABLET | Refills: 1 | Status: SHIPPED | OUTPATIENT
Start: 2019-01-17 | End: 2019-07-17 | Stop reason: SDUPTHER

## 2019-01-17 NOTE — PROGRESS NOTES
Assessment/Plan:    No problem-specific Assessment & Plan notes found for this encounter  Diagnoses and all orders for this visit:    Type 2 diabetes mellitus without complication, without long-term current use of insulin ([facility] [address] )  Comments:  Tonya Jackson is stable on exam  Meds reordered, and he is to take regularly  Continue a low carb diet, and regular exercise  Ref to Ophthalmology  Orders:  -     metFORMIN (GLUCOPHAGE) 1000 MG tablet; Take 1 tablet (1,000 mg total) by mouth 2 (two) times a day with meals for 90 days  -     sitaGLIPtin (JANUVIA) 100 mg tablet; Take 1 tablet (100 mg total) by mouth daily for 90 days  -     Comprehensive metabolic panel; Future  -     Lipid Panel with Direct LDL reflex; Future  -     HEMOGLOBIN A1C W/ EAG ESTIMATION; Future  -     Ambulatory referral to Ophthalmology; Future    Mixed hyperlipidemia  Comments:  Atorvastatin refilled, & the pt is to restart  Diet & exercise as above  Pt to repeat FBW in 3 months  Pt given new scripts for Glucometer, test strips, lancets  Orders:  -     atorvastatin (LIPITOR) 10 mg tablet; Take 1 tablet (10 mg total) by mouth daily at bedtime for 90 days  -     Lipid Panel with Direct LDL reflex; Future    Recurrent otitis externa, right  Comments:  Healing post I&D  He is to complete the course of BactrimDS  Acute stress reaction  Comments:  Pt doing better now  He is to continue to monitor his mood, and call us if any issues  Subjective:      Patient ID: Andre Carreno is a 44 y o  male  Jessica Ellington had issues with his insurance, and could not get his meds  We last saw Jessica Ellington in 07/2018 - A1c then 11 9%  He went to the ER this past week (records reviewed) - Had abscess of the right ear drained (now on Bactrim DS), Glucose there > 400 / +Glucosuria (349 today in clinic), etc   Only med he is taking is the old Metformin 500mg BID  He lost his relationship - things fell apart; Tonya Jackson did move out - he is getting to see his son    This was approx 2 months ago  Had been depressed, but is doing better  No HI/SI  Pt declines medication / referral for counseling at this time  The following portions of the patient's history were reviewed and updated as appropriate: allergies, current medications, past family history, past social history, past surgical history and problem list     Past Medical History:   Diagnosis Date    Diabetes mellitus ([facility] [address] )        Current Outpatient Prescriptions:     atorvastatin (LIPITOR) 10 mg tablet, Take 1 tablet (10 mg total) by mouth daily at bedtime for 90 days, Disp: 90 tablet, Rfl: 1    metFORMIN (GLUCOPHAGE) 1000 MG tablet, Take 1 tablet (1,000 mg total) by mouth 2 (two) times a day with meals for 90 days, Disp: 180 tablet, Rfl: 1    sitaGLIPtin (JANUVIA) 100 mg tablet, Take 1 tablet (100 mg total) by mouth daily for 90 days, Disp: 90 tablet, Rfl: 1    sulfamethoxazole-trimethoprim (BACTRIM DS) 800-160 mg per tablet, Take 1 tablet by mouth 2 (two) times a day for 7 days smx-tmp DS (BACTRIM) 800-160 mg tabs (1tab q12 D10), Disp: 14 tablet, Rfl: 0    No Known Allergies    Family History   Problem Relation Age of Onset    Hypertension Mother     Diabetes Father        Social History   Substance Use Topics    Smoking status: Current Every Day Smoker     Packs/day: 0 50     Types: Cigarettes    Smokeless tobacco: Not on file    Alcohol use No      Comment: [employer]--Social Alcohol       Review of Systems   Constitutional: Negative for activity change and fever  HENT: Positive for ear pain  Respiratory: Negative for shortness of breath  Cardiovascular: Negative for chest pain  Genitourinary: Positive for frequency  Negative for dysuria  Pt had glucosuria in the ER  Psychiatric/Behavioral: Positive for dysphoric mood  Negative for suicidal ideas  The patient is not nervous/anxious            Objective:      /80 (BP Location: Right arm, Patient Position: Sitting, Cuff Size: Standard)   Pulse (!) 111   Temp (!) 97 3 °F (36 3 °C) (Tympanic)   Ht 5' 9" (1 753 m)   Wt 83 8 kg (184 lb 12 8 oz)   SpO2 97%   BMI 27 29 kg/m²          Physical Exam   Constitutional: He is oriented to person, place, and time  He appears well-developed and well-nourished  No distress  HENT:   Head: Normocephalic and atraumatic  Ears:    Mouth/Throat: Oropharynx is clear and moist  No oropharyngeal exudate  Eyes: Conjunctivae are normal    Neck: Normal range of motion  Neck supple  No thyromegaly present  Cardiovascular: Normal rate, regular rhythm and normal heart sounds  Exam reveals no gallop and no friction rub  Pulses are no weak pulses  No murmur heard  Pulses:       Dorsalis pedis pulses are 2+ on the right side, and 2+ on the left side  Posterior tibial pulses are 2+ on the right side, and 2+ on the left side  Pulmonary/Chest: Effort normal and breath sounds normal  No respiratory distress  He has no wheezes  He has no rales  Musculoskeletal: He exhibits no edema  Feet:   Right Foot:   Skin Integrity: Positive for warmth  Negative for ulcer, skin breakdown, erythema, callus or dry skin  Left Foot:   Skin Integrity: Positive for warmth  Negative for ulcer, skin breakdown, erythema, callus or dry skin  Lymphadenopathy:     He has no cervical adenopathy  Neurological: He is alert and oriented to person, place, and time  Skin: He is not diaphoretic  Psychiatric: He has a normal mood and affect  His behavior is normal  Judgment and thought content normal    Carri Olivera was sad at times speaking about his break-up, but was very pleasant, answering all questions appropriately, etc    Nursing note and vitals reviewed  Patient's shoes and socks removed  Right Foot/Ankle   Right Foot Inspection  Skin Exam: skin normal, skin intact and warmth no dry skin, no callus, no erythema, no maceration, no abnormal color, no pre-ulcer, no ulcer and no callus                          Toe Exam: no swelling and erythema  Sensory       Monofilament testing: intact  Vascular  Capillary refills: < 3 seconds  The right DP pulse is 2+  The right PT pulse is 2+  Left Foot/Ankle  Left Foot Inspection  Skin Exam: skin normal, skin intact and warmthno dry skin, no erythema, no maceration, normal color, no pre-ulcer, no ulcer and no callus                                         Sensory       Monofilament: intact  Vascular  Capillary refills: < 3 seconds  The left DP pulse is 2+  The left PT pulse is 2+  Assign Risk Category:  No deformity present; No loss of protective sensation;  No weak pulses       Risk: 0

## 2019-04-17 ENCOUNTER — OFFICE VISIT (OUTPATIENT)
Dept: FAMILY MEDICINE CLINIC | Facility: CLINIC | Age: 44
End: 2019-04-17
Payer: COMMERCIAL

## 2019-04-17 VITALS
WEIGHT: 189 LBS | BODY MASS INDEX: 27.99 KG/M2 | OXYGEN SATURATION: 97 % | DIASTOLIC BLOOD PRESSURE: 80 MMHG | HEART RATE: 78 BPM | RESPIRATION RATE: 16 BRPM | TEMPERATURE: 98 F | SYSTOLIC BLOOD PRESSURE: 110 MMHG | HEIGHT: 69 IN

## 2019-04-17 DIAGNOSIS — F41.9 ANXIETY AND DEPRESSION: ICD-10-CM

## 2019-04-17 DIAGNOSIS — F32.A ANXIETY AND DEPRESSION: ICD-10-CM

## 2019-04-17 DIAGNOSIS — E78.2 MIXED HYPERLIPIDEMIA: ICD-10-CM

## 2019-04-17 DIAGNOSIS — E11.9 TYPE 2 DIABETES MELLITUS WITHOUT COMPLICATION, WITHOUT LONG-TERM CURRENT USE OF INSULIN (HCC): Primary | Chronic | ICD-10-CM

## 2019-04-17 LAB — SL AMB POCT HEMOGLOBIN AIC: 7.4 (ref ?–6.5)

## 2019-04-17 PROCEDURE — 3008F BODY MASS INDEX DOCD: CPT | Performed by: FAMILY MEDICINE

## 2019-04-17 PROCEDURE — 83036 HEMOGLOBIN GLYCOSYLATED A1C: CPT | Performed by: FAMILY MEDICINE

## 2019-04-17 PROCEDURE — 3045F PR MOST RECENT HEMOGLOBIN A1C LEVEL 7.0-9.0%: CPT | Performed by: FAMILY MEDICINE

## 2019-04-17 PROCEDURE — 99214 OFFICE O/P EST MOD 30 MIN: CPT | Performed by: FAMILY MEDICINE

## 2019-04-17 RX ORDER — ESCITALOPRAM OXALATE 10 MG/1
10 TABLET ORAL
COMMUNITY
Start: 2019-03-23 | End: 2019-07-17 | Stop reason: SDUPTHER

## 2019-07-17 ENCOUNTER — OFFICE VISIT (OUTPATIENT)
Dept: FAMILY MEDICINE CLINIC | Facility: CLINIC | Age: 44
End: 2019-07-17
Payer: COMMERCIAL

## 2019-07-17 VITALS
HEART RATE: 109 BPM | DIASTOLIC BLOOD PRESSURE: 60 MMHG | OXYGEN SATURATION: 97 % | BODY MASS INDEX: 27.43 KG/M2 | RESPIRATION RATE: 16 BRPM | WEIGHT: 185.2 LBS | TEMPERATURE: 97 F | HEIGHT: 69 IN | SYSTOLIC BLOOD PRESSURE: 130 MMHG

## 2019-07-17 DIAGNOSIS — N52.9 ERECTILE DYSFUNCTION, UNSPECIFIED ERECTILE DYSFUNCTION TYPE: ICD-10-CM

## 2019-07-17 DIAGNOSIS — E78.2 MIXED HYPERLIPIDEMIA: Chronic | ICD-10-CM

## 2019-07-17 DIAGNOSIS — M79.671 HEEL PAIN, BILATERAL: ICD-10-CM

## 2019-07-17 DIAGNOSIS — F33.41 RECURRENT MAJOR DEPRESSIVE DISORDER, IN PARTIAL REMISSION (HCC): ICD-10-CM

## 2019-07-17 DIAGNOSIS — E11.9 TYPE 2 DIABETES MELLITUS WITHOUT COMPLICATION, WITHOUT LONG-TERM CURRENT USE OF INSULIN (HCC): Primary | Chronic | ICD-10-CM

## 2019-07-17 DIAGNOSIS — M79.672 HEEL PAIN, BILATERAL: ICD-10-CM

## 2019-07-17 DIAGNOSIS — E78.2 MIXED HYPERLIPIDEMIA: ICD-10-CM

## 2019-07-17 DIAGNOSIS — R53.83 FATIGUE, UNSPECIFIED TYPE: ICD-10-CM

## 2019-07-17 DIAGNOSIS — E11.9 TYPE 2 DIABETES MELLITUS WITHOUT COMPLICATION, WITHOUT LONG-TERM CURRENT USE OF INSULIN (HCC): Chronic | ICD-10-CM

## 2019-07-17 DIAGNOSIS — L72.0 EPIDERMOID CYST OF FINGER OF LEFT HAND: ICD-10-CM

## 2019-07-17 LAB
CREAT UR-MCNC: 361 MG/DL
MICROALBUMIN UR-MCNC: 29.2 MG/L (ref 0–20)
MICROALBUMIN/CREAT 24H UR: 8 MG/G CREATININE (ref 0–30)

## 2019-07-17 PROCEDURE — 82043 UR ALBUMIN QUANTITATIVE: CPT | Performed by: FAMILY MEDICINE

## 2019-07-17 PROCEDURE — 3061F NEG MICROALBUMINURIA REV: CPT | Performed by: FAMILY MEDICINE

## 2019-07-17 PROCEDURE — 99214 OFFICE O/P EST MOD 30 MIN: CPT | Performed by: FAMILY MEDICINE

## 2019-07-17 PROCEDURE — 3008F BODY MASS INDEX DOCD: CPT | Performed by: FAMILY MEDICINE

## 2019-07-17 PROCEDURE — 82570 ASSAY OF URINE CREATININE: CPT | Performed by: FAMILY MEDICINE

## 2019-07-17 RX ORDER — ATORVASTATIN CALCIUM 10 MG/1
10 TABLET, FILM COATED ORAL
Qty: 90 TABLET | Refills: 3 | Status: CANCELLED | OUTPATIENT
Start: 2019-07-17 | End: 2019-10-15

## 2019-07-17 RX ORDER — ATORVASTATIN CALCIUM 10 MG/1
10 TABLET, FILM COATED ORAL
Qty: 90 TABLET | Refills: 1 | Status: SHIPPED | OUTPATIENT
Start: 2019-07-17 | End: 2019-11-18 | Stop reason: SDUPTHER

## 2019-07-17 RX ORDER — SILDENAFIL 50 MG/1
50 TABLET, FILM COATED ORAL DAILY PRN
Qty: 10 TABLET | Refills: 0 | Status: SHIPPED | OUTPATIENT
Start: 2019-07-17 | End: 2019-10-08 | Stop reason: SDUPTHER

## 2019-07-17 RX ORDER — ESCITALOPRAM OXALATE 10 MG/1
10 TABLET ORAL DAILY
Qty: 90 TABLET | Refills: 1 | Status: SHIPPED | OUTPATIENT
Start: 2019-07-17 | End: 2020-06-11

## 2019-07-17 NOTE — PROGRESS NOTES
Assessment/Plan:    No problem-specific Assessment & Plan notes found for this encounter  Diagnoses and all orders for this visit:    Type 2 diabetes mellitus without complication, without long-term current use of insulin ([facility] )  Comments:  Bran Avina is stable on exam   Pt to continue to work on a lower carb diet, and exercise  No changes to meds right now - pt urged to get his FBW done  Orders:  -     Ambulatory referral to Ophthalmology; Future  -     HEMOGLOBIN A1C W/ EAG ESTIMATION; Future  -     Lipid Panel with Direct LDL reflex; Future  -     Comprehensive metabolic panel; Future  -     Microalbumin / creatinine urine ratio  -     sitaGLIPtin (JANUVIA) 100 mg tablet; Take 1 tablet (100 mg total) by mouth daily for 90 days  -     metFORMIN (GLUCOPHAGE) 1000 MG tablet; Take 1 tablet (1,000 mg total) by mouth 2 (two) times a day with meals for 90 days    Mixed hyperlipidemia  Comments:  Stable on present management - Meds refilled  FBW is pending  Pt also referred to Ophthalmology  Orders:  -     Lipid Panel with Direct LDL reflex; Future  -     Comprehensive metabolic panel; Future  -     atorvastatin (LIPITOR) 10 mg tablet; Take 1 tablet (10 mg total) by mouth daily at bedtime for 90 days    Recurrent major depressive disorder, in partial remission ([facility] )  Comments:  Stable on Lexapro  Orders:  -     escitalopram (LEXAPRO) 10 mg tablet; Take 1 tablet (10 mg total) by mouth daily    Heel pain, bilateral  Comments:  Possibly some neuropathy with his diabetes; A1c is pending  Xrays ordered  Orders:  -     XR foot 3+ vw right; Future  -     XR foot 3+ vw left; Future    Erectile dysfunction, unspecified erectile dysfunction type  Comments: Will try pt on a trial of PRN Sildenafil  Discussed with the pt how to take the medication, and potential R/SE  Orders:  -     sildenafil (VIAGRA) 50 MG tablet;  Take 1 tablet (50 mg total) by mouth daily as needed for erectile dysfunction    Fatigue, unspecified type  Comments:  CBC and TSH incl with FBW  Orders:  -     CBC and differential; Future  -     TSH, 3rd generation with Free T4 reflex; Future    Epidermoid cyst of finger of left hand  Comments:  Pt referred to [facility]  Orders:  -     Cancel: Ambulatory referral to Orthopedic Surgery; Future  -     Ambulatory referral to Orthopedic Surgery; Future          Subjective:      Patient ID: Beulah Chandler is a 44 y o  male  Mood overall at home is pretty good  Not regularly checking his sugars at home - we discussed  He cut way back on soda intake; improved his diet  Occasional pain in his heels  Random at times; will bother him at rest too  Pt with a newer cyst on the left hand as well  Previously ordered FBW has not yet been done        The following portions of the patient's history were reviewed and updated as appropriate: allergies, current medications, past family history, past social history, past surgical history and problem list     Past Medical History:   Diagnosis Date    Diabetes mellitus ([facility] )        Current Outpatient Medications:     atorvastatin (LIPITOR) 10 mg tablet, Take 1 tablet (10 mg total) by mouth daily at bedtime for 90 days, Disp: 90 tablet, Rfl: 1    escitalopram (LEXAPRO) 10 mg tablet, Take 1 tablet (10 mg total) by mouth daily, Disp: 90 tablet, Rfl: 1    metFORMIN (GLUCOPHAGE) 1000 MG tablet, Take 1 tablet (1,000 mg total) by mouth 2 (two) times a day with meals for 90 days, Disp: 180 tablet, Rfl: 1    sildenafil (VIAGRA) 50 MG tablet, Take 1 tablet (50 mg total) by mouth daily as needed for erectile dysfunction, Disp: 10 tablet, Rfl: 0    sitaGLIPtin (JANUVIA) 100 mg tablet, Take 1 tablet (100 mg total) by mouth daily for 90 days, Disp: 90 tablet, Rfl: 1    No Known Allergies    Social History     Tobacco Use    Smoking status: Current Every Day Smoker     Packs/day: 0 50     Types: Cigarettes    Smokeless tobacco: Never Used   Substance Use Topics  Alcohol use: No     Comment: [employer]--Social Alcohol    Drug use: No         Review of Systems   Constitutional: Positive for fatigue  Negative for activity change and fever  Recent fatigue  HENT: Negative for congestion and rhinorrhea  Respiratory: Negative for cough and shortness of breath  Cardiovascular: Negative for chest pain  Genitourinary:        No nocturia  Some ED issues  No lack of libido  Musculoskeletal: Negative for myalgias  Psychiatric/Behavioral: Negative for dysphoric mood and suicidal ideas  The patient is not nervous/anxious  Objective:      /60   Pulse (!) 109   Temp (!) 97 °F (36 1 °C)   Resp 16   Ht 5' 8 78" (1 747 m)   Wt 84 kg (185 lb 3 2 oz)   SpO2 97%   BMI 27 52 kg/m²          Physical Exam   Constitutional: He is oriented to person, place, and time  He appears well-developed and well-nourished  No distress  HENT:   Head: Normocephalic and atraumatic  Eyes: Conjunctivae are normal    Neck: Normal range of motion  Neck supple  No thyromegaly present  Cardiovascular: Normal rate, regular rhythm, normal heart sounds and intact distal pulses  Exam reveals no gallop and no friction rub  No murmur heard  Pulmonary/Chest: Effort normal and breath sounds normal  No stridor  No respiratory distress  He has no wheezes  He has no rales  Musculoskeletal: He exhibits no edema  Hands:       Feet:    Lymphadenopathy:     He has no cervical adenopathy  Neurological: He is alert and oriented to person, place, and time  Skin: He is not diaphoretic  Psychiatric: He has a normal mood and affect  His behavior is normal  Judgment and thought content normal    Nursing note and vitals reviewed

## 2019-07-24 ENCOUNTER — TRANSCRIBE ORDERS (OUTPATIENT)
Dept: RADIOLOGY | Facility: HOSPITAL | Age: 44
End: 2019-07-24

## 2019-07-24 ENCOUNTER — HOSPITAL ENCOUNTER (OUTPATIENT)
Dept: RADIOLOGY | Facility: HOSPITAL | Age: 44
Discharge: HOME | End: 2019-07-24
Payer: COMMERCIAL

## 2019-07-24 DIAGNOSIS — M79.672 HEEL PAIN, BILATERAL: ICD-10-CM

## 2019-07-24 DIAGNOSIS — M79.671 HEEL PAIN, BILATERAL: ICD-10-CM

## 2019-07-24 PROCEDURE — 73630 X-RAY EXAM OF FOOT: CPT

## 2019-08-02 NOTE — RESULT ENCOUNTER NOTE
Please let the patient know that their foot xrays showed no acute findings (fractures, etc), or any significant arthritic / degenerative changes  Thanks     Dr Shweta Sánchez

## 2019-08-14 ENCOUNTER — CONSULT (OUTPATIENT)
Dept: OBGYN CLINIC | Facility: HOSPITAL | Age: 44
End: 2019-08-14
Payer: COMMERCIAL

## 2019-08-14 VITALS
HEIGHT: 69 IN | DIASTOLIC BLOOD PRESSURE: 72 MMHG | SYSTOLIC BLOOD PRESSURE: 111 MMHG | HEART RATE: 98 BPM | BODY MASS INDEX: 27.08 KG/M2 | WEIGHT: 182.8 LBS

## 2019-08-14 DIAGNOSIS — R22.32 MASS OF HAND, LEFT: ICD-10-CM

## 2019-08-14 PROCEDURE — 99203 OFFICE O/P NEW LOW 30 MIN: CPT | Performed by: ORTHOPAEDIC SURGERY

## 2019-08-14 RX ORDER — CEFAZOLIN SODIUM 2 G/50ML
2000 SOLUTION INTRAVENOUS ONCE
Status: CANCELLED | OUTPATIENT
Start: 2019-08-14 | End: 2019-08-14

## 2019-08-14 NOTE — PROGRESS NOTES
ASSESSMENT/PLAN:    Assessment:   Mass left hand    Plan:   Excision mass left hand    Follow Up: After Surgery    To Do Next Visit:  Sutures out    Operative Discussions:     Standard Consent: The risks and benefits of the procedure were explained to the patient, which include, but are not limited to: Bleeding, infection, recurrence, pain, scar, damage to tendons, damage to nerves, and damage to blood vessels, failure to give desired results and complications related to anesthesia  These risks, along with alternative conservative treatment options, and postoperative protocols were voiced back and understood by the patient  All questions were answered to the patient's satisfaction  The patient agrees to comply with a standard postoperative protocol, and is willing to proceed  Education was provided via written and auditory forms  There were no barriers to learning  Written handouts regarding wound care, incision and scar care, and general preoperative information was provided to the patient  Prior to surgery, the patient may be requested to stop all anti-inflammatory medications  Prophylactic aspirin, Plavix, and Coumadin may be allowed to be continued  Medications including vitamin E , ginkgo, and fish oil are requested to be stopped approximately one week prior to surgery  Hypertensive medications and beta blockers, if taken, should be continued  _____________________________________________________  CHIEF COMPLAINT:  No chief complaint on file  SUBJECTIVE:  Lucy Torres is a 44y o  year old male who presents with mass to the left thumb and index web space  This started  2 year(s) ago as Insidious  Patient states it has progressively been getting larger over the past year  He has increased pain with gripping  He denies any injury to the area  He denies any numbness or tingling     Radiation: None  Previous Treatments: None without relief  Associated symptoms: Pain  Mild  Intermittant Dull and Aching    PAST MEDICAL HISTORY:  Past Medical History:   Diagnosis Date    Diabetes mellitus ([facility] )        PAST SURGICAL HISTORY:  History reviewed  No pertinent surgical history  FAMILY HISTORY:  Family History   Problem Relation Age of Onset    Hypertension Mother     Diabetes Father        SOCIAL HISTORY:  Social History     Tobacco Use    Smoking status: Current Every Day Smoker     Packs/day: 0 50     Types: Cigarettes    Smokeless tobacco: Never Used   Substance Use Topics    Alcohol use: No     Comment: [employer]--Social Alcohol    Drug use: No       MEDICATIONS:    Current Outpatient Medications:     atorvastatin (LIPITOR) 10 mg tablet, Take 1 tablet (10 mg total) by mouth daily at bedtime for 90 days, Disp: 90 tablet, Rfl: 1    escitalopram (LEXAPRO) 10 mg tablet, Take 1 tablet (10 mg total) by mouth daily, Disp: 90 tablet, Rfl: 1    metFORMIN (GLUCOPHAGE) 1000 MG tablet, Take 1 tablet (1,000 mg total) by mouth 2 (two) times a day with meals for 90 days, Disp: 180 tablet, Rfl: 1    sildenafil (VIAGRA) 50 MG tablet, Take 1 tablet (50 mg total) by mouth daily as needed for erectile dysfunction, Disp: 10 tablet, Rfl: 0    sitaGLIPtin (JANUVIA) 100 mg tablet, Take 1 tablet (100 mg total) by mouth daily for 90 days, Disp: 90 tablet, Rfl: 1    ALLERGIES:  No Known Allergies    REVIEW OF SYSTEMS:  Pertinent items are noted in HPI  A comprehensive review of systems was negative      LABS:  HgA1c:   Lab Results   Component Value Date    HGBA1C 7 4 (A) 04/17/2019     BMP:   Lab Results   Component Value Date    CALCIUM 9 7 01/15/2019    K 4 7 01/15/2019    CO2 29 01/15/2019    CL 93 (L) 01/15/2019    BUN 11 01/15/2019    CREATININE 1 01 01/15/2019         _____________________________________________________  PHYSICAL EXAMINATION:  /72   Pulse 98   Ht 5' 9" (1 753 m)   Wt 82 9 kg (182 lb 12 8 oz)   BMI 26 99 kg/m²   General: well developed and well nourished, alert, oriented times 3 and appears comfortable  Psychiatric: Normal  HEENT: Trachea Midline, No torticollis  Cardiovascular: No discernable arrhythmia  Pulmonary: No wheezing or stridor  Skin: No Erythema, No Lacerations, No Fluctuation, No Ulcerations  Neurovascular: Sensation Intact to the Median, Ulnar, Radial Nerve, Motor Intact to the Median, Ulnar, Radial Nerve and Pulses Intact    MUSCULOSKELETAL EXAMINATION:  Left hand:  Large firm mobile mass over the palmar aspect of the thumb and index finger web space  Doses not transilluminate    Radial abduction and ulnar abduction equal to non affected side   _____________________________________________________  STUDIES REVIEWED:  No Studies to review      PROCEDURES PERFORMED:  Procedures  No Procedures performed today      Scribe Attestation    I,:   Patty Eungayla am acting as a scribe while in the presence of the attending physician :        I,:   Maxim Moeller MD personally performed the services described in this documentation    as scribed in my presence :

## 2019-08-14 NOTE — H&P
ASSESSMENT/PLAN:    Assessment:   Mass left hand    Plan:   Excision mass left hand    Follow Up: After Surgery    To Do Next Visit:  Sutures out    Operative Discussions:     Standard Consent: The risks and benefits of the procedure were explained to the patient, which include, but are not limited to: Bleeding, infection, recurrence, pain, scar, damage to tendons, damage to nerves, and damage to blood vessels, failure to give desired results and complications related to anesthesia  These risks, along with alternative conservative treatment options, and postoperative protocols were voiced back and understood by the patient  All questions were answered to the patient's satisfaction  The patient agrees to comply with a standard postoperative protocol, and is willing to proceed  Education was provided via written and auditory forms  There were no barriers to learning  Written handouts regarding wound care, incision and scar care, and general preoperative information was provided to the patient  Prior to surgery, the patient may be requested to stop all anti-inflammatory medications  Prophylactic aspirin, Plavix, and Coumadin may be allowed to be continued  Medications including vitamin E , ginkgo, and fish oil are requested to be stopped approximately one week prior to surgery  Hypertensive medications and beta blockers, if taken, should be continued  _____________________________________________________  CHIEF COMPLAINT:  No chief complaint on file  SUBJECTIVE:  Rose Li is a 44y o  year old male who presents with mass to the left thumb and index web space  This started  2 year(s) ago as Insidious  Patient states it has progressively been getting larger over the past year  He has increased pain with gripping  He denies any injury to the area  He denies any numbness or tingling     Radiation: None  Previous Treatments: None without relief  Associated symptoms: Pain  Mild  Intermittant Dull and Aching    PAST MEDICAL HISTORY:  Past Medical History:   Diagnosis Date    Diabetes mellitus ([facility] )        PAST SURGICAL HISTORY:  History reviewed  No pertinent surgical history  FAMILY HISTORY:  Family History   Problem Relation Age of Onset    Hypertension Mother     Diabetes Father        SOCIAL HISTORY:  Social History     Tobacco Use    Smoking status: Current Every Day Smoker     Packs/day: 0 50     Types: Cigarettes    Smokeless tobacco: Never Used   Substance Use Topics    Alcohol use: No     Comment: [employer]--Social Alcohol    Drug use: No       MEDICATIONS:    Current Outpatient Medications:     atorvastatin (LIPITOR) 10 mg tablet, Take 1 tablet (10 mg total) by mouth daily at bedtime for 90 days, Disp: 90 tablet, Rfl: 1    escitalopram (LEXAPRO) 10 mg tablet, Take 1 tablet (10 mg total) by mouth daily, Disp: 90 tablet, Rfl: 1    metFORMIN (GLUCOPHAGE) 1000 MG tablet, Take 1 tablet (1,000 mg total) by mouth 2 (two) times a day with meals for 90 days, Disp: 180 tablet, Rfl: 1    sildenafil (VIAGRA) 50 MG tablet, Take 1 tablet (50 mg total) by mouth daily as needed for erectile dysfunction, Disp: 10 tablet, Rfl: 0    sitaGLIPtin (JANUVIA) 100 mg tablet, Take 1 tablet (100 mg total) by mouth daily for 90 days, Disp: 90 tablet, Rfl: 1    ALLERGIES:  No Known Allergies    REVIEW OF SYSTEMS:  Pertinent items are noted in HPI  A comprehensive review of systems was negative      LABS:  HgA1c:   Lab Results   Component Value Date    HGBA1C 7 4 (A) 04/17/2019     BMP:   Lab Results   Component Value Date    CALCIUM 9 7 01/15/2019    K 4 7 01/15/2019    CO2 29 01/15/2019    CL 93 (L) 01/15/2019    BUN 11 01/15/2019    CREATININE 1 01 01/15/2019         _____________________________________________________  PHYSICAL EXAMINATION:  /72   Pulse 98   Ht 5' 9" (1 753 m)   Wt 82 9 kg (182 lb 12 8 oz)   BMI 26 99 kg/m²    General: well developed and well nourished, alert, oriented times 3 and appears comfortable  Psychiatric: Normal  HEENT: Trachea Midline, No torticollis  Cardiovascular: No discernable arrhythmia  Pulmonary: No wheezing or stridor  Skin: No Erythema, No Lacerations, No Fluctuation, No Ulcerations  Neurovascular: Sensation Intact to the Median, Ulnar, Radial Nerve, Motor Intact to the Median, Ulnar, Radial Nerve and Pulses Intact    MUSCULOSKELETAL EXAMINATION:  Left hand:  Large firm mobile mass over the palmar aspect of the thumb and index finger web space  Doses not transilluminate    Radial abduction and ulnar abduction equal to non affected side   _____________________________________________________  STUDIES REVIEWED:  No Studies to review      PROCEDURES PERFORMED:  Procedures  No Procedures performed today      Scribe Attestation    I,:   Moisés Joel am acting as a scribe while in the presence of the attending physician :        I,:   Parrish Beckett MD personally performed the services described in this documentation    as scribed in my presence :

## 2019-08-17 ENCOUNTER — APPOINTMENT (OUTPATIENT)
Dept: LAB | Facility: HOSPITAL | Age: 44
End: 2019-08-17
Payer: COMMERCIAL

## 2019-08-17 DIAGNOSIS — E11.9 TYPE 2 DIABETES MELLITUS WITHOUT COMPLICATION, WITHOUT LONG-TERM CURRENT USE OF INSULIN (HCC): Chronic | ICD-10-CM

## 2019-08-17 DIAGNOSIS — E78.2 MIXED HYPERLIPIDEMIA: ICD-10-CM

## 2019-08-17 DIAGNOSIS — R53.83 FATIGUE, UNSPECIFIED TYPE: ICD-10-CM

## 2019-08-17 DIAGNOSIS — E78.2 MIXED HYPERLIPIDEMIA: Chronic | ICD-10-CM

## 2019-08-17 LAB
ALBUMIN SERPL BCP-MCNC: 3.6 G/DL (ref 3.5–5)
ALP SERPL-CCNC: 72 U/L (ref 46–116)
ALT SERPL W P-5'-P-CCNC: 30 U/L (ref 12–78)
ANION GAP SERPL CALCULATED.3IONS-SCNC: 7 MMOL/L (ref 4–13)
AST SERPL W P-5'-P-CCNC: 10 U/L (ref 5–45)
BASOPHILS # BLD AUTO: 0.06 THOUSANDS/ΜL (ref 0–0.1)
BASOPHILS NFR BLD AUTO: 1 % (ref 0–1)
BILIRUB SERPL-MCNC: 0.48 MG/DL (ref 0.2–1)
BUN SERPL-MCNC: 8 MG/DL (ref 5–25)
CALCIUM SERPL-MCNC: 8.9 MG/DL (ref 8.3–10.1)
CHLORIDE SERPL-SCNC: 101 MMOL/L (ref 100–108)
CHOLEST SERPL-MCNC: 184 MG/DL (ref 50–200)
CO2 SERPL-SCNC: 29 MMOL/L (ref 21–32)
CREAT SERPL-MCNC: 0.66 MG/DL (ref 0.6–1.3)
EOSINOPHIL # BLD AUTO: 0.11 THOUSAND/ΜL (ref 0–0.61)
EOSINOPHIL NFR BLD AUTO: 2 % (ref 0–6)
ERYTHROCYTE [DISTWIDTH] IN BLOOD BY AUTOMATED COUNT: 12.2 % (ref 11.6–15.1)
EST. AVERAGE GLUCOSE BLD GHB EST-MCNC: 217 MG/DL
GFR SERPL CREATININE-BSD FRML MDRD: 119 ML/MIN/1.73SQ M
GLUCOSE P FAST SERPL-MCNC: 209 MG/DL (ref 65–99)
HBA1C MFR BLD: 9.2 % (ref 4.2–6.3)
HCT VFR BLD AUTO: 45.2 % (ref 36.5–49.3)
HDLC SERPL-MCNC: 53 MG/DL (ref 40–60)
HGB BLD-MCNC: 16 G/DL (ref 12–17)
IMM GRANULOCYTES # BLD AUTO: 0.01 THOUSAND/UL (ref 0–0.2)
IMM GRANULOCYTES NFR BLD AUTO: 0 % (ref 0–2)
LDLC SERPL CALC-MCNC: 89 MG/DL (ref 0–100)
LYMPHOCYTES # BLD AUTO: 1.8 THOUSANDS/ΜL (ref 0.6–4.47)
LYMPHOCYTES NFR BLD AUTO: 27 % (ref 14–44)
MCH RBC QN AUTO: 32 PG (ref 26.8–34.3)
MCHC RBC AUTO-ENTMCNC: 35.4 G/DL (ref 31.4–37.4)
MCV RBC AUTO: 90 FL (ref 82–98)
MONOCYTES # BLD AUTO: 0.5 THOUSAND/ΜL (ref 0.17–1.22)
MONOCYTES NFR BLD AUTO: 8 % (ref 4–12)
NEUTROPHILS # BLD AUTO: 4.09 THOUSANDS/ΜL (ref 1.85–7.62)
NEUTS SEG NFR BLD AUTO: 62 % (ref 43–75)
NRBC BLD AUTO-RTO: 0 /100 WBCS
PLATELET # BLD AUTO: 227 THOUSANDS/UL (ref 149–390)
PMV BLD AUTO: 11.6 FL (ref 8.9–12.7)
POTASSIUM SERPL-SCNC: 3.6 MMOL/L (ref 3.5–5.3)
PROT SERPL-MCNC: 7.4 G/DL (ref 6.4–8.2)
RBC # BLD AUTO: 5 MILLION/UL (ref 3.88–5.62)
SODIUM SERPL-SCNC: 137 MMOL/L (ref 136–145)
TRIGL SERPL-MCNC: 211 MG/DL
TSH SERPL DL<=0.05 MIU/L-ACNC: 0.67 UIU/ML (ref 0.36–3.74)
WBC # BLD AUTO: 6.57 THOUSAND/UL (ref 4.31–10.16)

## 2019-08-17 PROCEDURE — 80053 COMPREHEN METABOLIC PANEL: CPT

## 2019-08-17 PROCEDURE — 36415 COLL VENOUS BLD VENIPUNCTURE: CPT

## 2019-08-17 PROCEDURE — 83036 HEMOGLOBIN GLYCOSYLATED A1C: CPT

## 2019-08-17 PROCEDURE — 84443 ASSAY THYROID STIM HORMONE: CPT

## 2019-08-17 PROCEDURE — 85025 COMPLETE CBC W/AUTO DIFF WBC: CPT

## 2019-08-17 PROCEDURE — 80061 LIPID PANEL: CPT

## 2019-08-20 PROBLEM — R22.32 MASS OF HAND, LEFT: Status: ACTIVE | Noted: 2019-08-20

## 2019-09-10 ENCOUNTER — TELEPHONE (OUTPATIENT)
Dept: OBGYN CLINIC | Facility: HOSPITAL | Age: 44
End: 2019-09-10

## 2019-09-10 NOTE — TELEPHONE ENCOUNTER
Called and s/w patient he would like to have his surgery done on 10/2, and he will be coming in to bring in his FMLA paperwork  Po has been rescheduled as well      Thanks

## 2019-09-10 NOTE — TELEPHONE ENCOUNTER
Patient sees Dr Lia Quinteros  He was told to call back with any issues  He thinks that the mass on his hand is getting bigger  It is also sore to the touch  Surgery is scheduled for 10/22  He is asking what to do?

## 2019-10-02 ENCOUNTER — PREP FOR PROCEDURE (OUTPATIENT)
Dept: OBGYN CLINIC | Facility: HOSPITAL | Age: 44
End: 2019-10-02

## 2019-10-02 NOTE — PRE-PROCEDURE INSTRUCTIONS
Pre-Surgery Instructions:   Medication Instructions    atorvastatin (LIPITOR) 10 mg tablet Instructed patient per Anesthesia Guidelines   escitalopram (LEXAPRO) 10 mg tablet Instructed patient per Anesthesia Guidelines   metFORMIN (GLUCOPHAGE) 1000 MG tablet Instructed patient per Anesthesia Guidelines   sitaGLIPtin (JANUVIA) 100 mg tablet Instructed patient per Anesthesia Guidelines  Before your operation, you play an important role in decreasing your risk for infection by washing with special antiseptic soap  This is an effective way to reduce bacteria on the skin which may help to prevent infections at the surgical site  Please read the following directions in advance  1  In the week before your operation purchase a 4 ounce bottle of antiseptic soap containing chlorhexidine gluconate 4%  Some brand names include: Aplicare, Endure, and Hibiclens  The cost is usually less than $5 00  · For your convenience, the [employer] carries the soap  · It may also be available at your doctor's office or pre-admission testing center, and at most retail pharmacies  · If you are allergic or sensitive to soaps containing chlorhexidine gluconate (CHG), please let your doctor know so another antiseptic soap can be suggested  · CHG antiseptic soap is for external use only  2      The day before your operation follow these directions carefully to get ready  · Place clean lines (sheets) on your bed; you should sleep on clean sheets after your evening shower  · Get clean towels and washcloths ready - you need enough for 2 showers  · Set aside clean underwear, pajamas, and clothing to wear after the shower  Reminders:  · DO NOT use any other soap or body rinse on your skin during or after the antiseptic showers  · DO NOT use lotion , powder, deodorant, or perfume/aftershave of any kind on your skin after your antiseptic shower    · DO NOT shave any body parts in the 24 hours/the day before your operation  · DO NOT get the antiseptic soap in your eyes, ears, nose, mouth, or vaginal area  3      You will need to shower the night before AND the morning of your Surgery  Shower 1:  · The evening before your operation, take the fist shower  · First, shampoo your hair with regular shampoo and rinse it completely before you use the anitseptic soap  After washing and rinsing your hair, rinse your body  · Next, use a clean wash cloth to apply the antiseptic soap and wash your body from the neck down to your toes using 1/2 bottle of the antiseptic soap  You will use the other 1/2 bottle for the second shower  · Clean the area where your incision will be; later this area well for about 2 minutes  · If you ar having head or neck surgery, wash areas with the antiseptic soap  · Rinse yourself completely with running water  · Use a clean towel to dry off  · Wear clean underwear and clothing/pajamas  Shower 2:  · The Morning of your operation, take the second shower following the same steps as Shower 1 using the second 1/2 of the bottle of antiseptic soap  · Use clean cloths and towels to was and dry yourself off  · Wear clean underwear and clothing

## 2019-10-03 ENCOUNTER — HOSPITAL ENCOUNTER (OUTPATIENT)
Facility: HOSPITAL | Age: 44
Setting detail: OUTPATIENT SURGERY
Discharge: HOME | End: 2019-10-03
Attending: ORTHOPAEDIC SURGERY | Admitting: ORTHOPAEDIC SURGERY
Payer: COMMERCIAL

## 2019-10-03 VITALS
TEMPERATURE: 98.1 F | OXYGEN SATURATION: 97 % | SYSTOLIC BLOOD PRESSURE: 120 MMHG | DIASTOLIC BLOOD PRESSURE: 91 MMHG | HEIGHT: 69 IN | BODY MASS INDEX: 26.9 KG/M2 | RESPIRATION RATE: 18 BRPM | HEART RATE: 81 BPM | WEIGHT: 181.6 LBS

## 2019-10-03 LAB — GLUCOSE SERPL-MCNC: 271 MG/DL (ref 65–140)

## 2019-10-03 PROCEDURE — 99212 OFFICE O/P EST SF 10 MIN: CPT | Performed by: ORTHOPAEDIC SURGERY

## 2019-10-03 PROCEDURE — 82948 REAGENT STRIP/BLOOD GLUCOSE: CPT

## 2019-10-03 RX ORDER — CEFAZOLIN SODIUM 2 G/50ML
2000 SOLUTION INTRAVENOUS ONCE
Status: DISCONTINUED | OUTPATIENT
Start: 2019-10-03 | End: 2019-10-03 | Stop reason: HOSPADM

## 2019-10-03 RX ORDER — SODIUM CHLORIDE, SODIUM LACTATE, POTASSIUM CHLORIDE, CALCIUM CHLORIDE 600; 310; 30; 20 MG/100ML; MG/100ML; MG/100ML; MG/100ML
75 INJECTION, SOLUTION INTRAVENOUS CONTINUOUS
Status: DISCONTINUED | OUTPATIENT
Start: 2019-10-03 | End: 2019-10-03 | Stop reason: HOSPADM

## 2019-10-03 RX ADMIN — SODIUM CHLORIDE, SODIUM LACTATE, POTASSIUM CHLORIDE, AND CALCIUM CHLORIDE 75 ML/HR: .6; .31; .03; .02 INJECTION, SOLUTION INTRAVENOUS at 07:55

## 2019-10-03 NOTE — PROGRESS NOTES
Patient presents for elective surgery today in the hospital   Patient admits that he has not been following his blood sugar closely  Today on admission, fasting blood sugar 271  At this point, we encourage patient to keep appointment with primary care doctor as well as to be diligent about blood sugar glucose monitoring and diabetic control  Surgery will be canceled today given significantly increased risk of complications and infections given uncontrolled diabetes  Patient was informed directly, is agreeable to the plan  We will reschedule once patient's diabetes is more appropriately controlled

## 2019-10-08 ENCOUNTER — OFFICE VISIT (OUTPATIENT)
Dept: FAMILY MEDICINE CLINIC | Facility: CLINIC | Age: 44
End: 2019-10-08
Payer: COMMERCIAL

## 2019-10-08 VITALS
SYSTOLIC BLOOD PRESSURE: 122 MMHG | OXYGEN SATURATION: 97 % | HEIGHT: 69 IN | WEIGHT: 183.6 LBS | BODY MASS INDEX: 27.19 KG/M2 | DIASTOLIC BLOOD PRESSURE: 74 MMHG | RESPIRATION RATE: 16 BRPM | HEART RATE: 109 BPM | TEMPERATURE: 97.3 F

## 2019-10-08 DIAGNOSIS — N52.9 ERECTILE DYSFUNCTION, UNSPECIFIED ERECTILE DYSFUNCTION TYPE: ICD-10-CM

## 2019-10-08 DIAGNOSIS — E11.9 TYPE 2 DIABETES MELLITUS WITHOUT COMPLICATION, WITHOUT LONG-TERM CURRENT USE OF INSULIN (HCC): Primary | Chronic | ICD-10-CM

## 2019-10-08 DIAGNOSIS — E78.2 MIXED HYPERLIPIDEMIA: ICD-10-CM

## 2019-10-08 DIAGNOSIS — R22.32 MASS OF HAND, LEFT: ICD-10-CM

## 2019-10-08 PROCEDURE — 99214 OFFICE O/P EST MOD 30 MIN: CPT | Performed by: FAMILY MEDICINE

## 2019-10-08 RX ORDER — SILDENAFIL 50 MG/1
50 TABLET, FILM COATED ORAL DAILY PRN
Qty: 10 TABLET | Refills: 0 | Status: SHIPPED | OUTPATIENT
Start: 2019-10-08

## 2019-10-08 NOTE — PROGRESS NOTES
Assessment/Plan:    No problem-specific Assessment & Plan notes found for this encounter  Diagnoses and all orders for this visit:    Type 2 diabetes mellitus without complication, without long-term current use of insulin ([facility] [address] )  Comments:  Kiara Ceja is stable on exam - he is to continue present meds, but will now add low dose Jardiance (disc potential R/SE)  Continue lower carb diet, and exercise  Orders:  -     Empagliflozin 10 MG TABS; Take 1 tablet (10 mg total) by mouth every morning  -     Comprehensive metabolic panel; Future  -     HEMOGLOBIN A1C W/ EAG ESTIMATION; Future    Mass of hand, left  Comments: Followed by Ortho - surgery on hold due to glucose  Will see how adjusting meds works -> re-evaluate in 6 weeks  If improved, should be fine for surgery  Mixed hyperlipidemia  Comments:  Stable on Atorvastatin  Orders:  -     Lipid Panel with Direct LDL reflex; Future    Erectile dysfunction, unspecified erectile dysfunction type  Comments:  PRN Sildenafil again ordered for the pt - issue with first order being filled  Orders:  -     sildenafil (VIAGRA) 50 MG tablet; Take 1 tablet (50 mg total) by mouth daily as needed for erectile dysfunction          Subjective:      Patient ID: Uriel Mckeon is a 38 y o  male  Fortino Pereyra had a recent surgery with Hand Surgery (to remove a soft tissue mass between the thumb and second digit, left hand) cancelled at home due to a high POC blood glucose  Not really checking his glucose readings at home  A1c in 08/2019 at 9 2% (last was a POCT A1c in 04/2019 at 7 4%)  Taking meds regularly  Diet really has not changed; he has been working on less carbs in his diet            The following portions of the patient's history were reviewed and updated as appropriate: allergies, current medications, past family history, past social history, past surgical history and problem list     Past Medical History:   Diagnosis Date    Diabetes mellitus ([facility] [address] )        Current Outpatient Medications:     atorvastatin (LIPITOR) 10 mg tablet, Take 1 tablet (10 mg total) by mouth daily at bedtime for 90 days, Disp: 90 tablet, Rfl: 1    Empagliflozin 10 MG TABS, Take 1 tablet (10 mg total) by mouth every morning, Disp: 30 tablet, Rfl: 5    escitalopram (LEXAPRO) 10 mg tablet, Take 1 tablet (10 mg total) by mouth daily, Disp: 90 tablet, Rfl: 1    metFORMIN (GLUCOPHAGE) 1000 MG tablet, Take 1 tablet (1,000 mg total) by mouth 2 (two) times a day with meals for 90 days, Disp: 180 tablet, Rfl: 1    sildenafil (VIAGRA) 50 MG tablet, Take 1 tablet (50 mg total) by mouth daily as needed for erectile dysfunction, Disp: 10 tablet, Rfl: 0    sitaGLIPtin (JANUVIA) 100 mg tablet, Take 1 tablet (100 mg total) by mouth daily for 90 days, Disp: 90 tablet, Rfl: 1    No Known Allergies    Social History     Tobacco Use    Smoking status: Current Every Day Smoker     Packs/day: 0 50     Types: Cigarettes    Smokeless tobacco: Never Used   Substance Use Topics    Alcohol use: No     Comment: [employer]--Social Alcohol    Drug use: No         Review of Systems   Constitutional: Negative for activity change and unexpected weight change  Eyes: Negative for visual disturbance  Respiratory: Negative for shortness of breath  Cardiovascular: Negative for chest pain  Endocrine: Negative for polyuria  Genitourinary:        No nocturia  Objective:      /74 (BP Location: Right arm, Patient Position: Sitting, Cuff Size: Standard)   Pulse (!) 109   Temp (!) 97 3 °F (36 3 °C)   Resp 16   Ht 5' 8 74" (1 746 m)   Wt 83 3 kg (183 lb 9 6 oz)   SpO2 97%   BMI 27 32 kg/m²          Physical Exam   Constitutional: He is oriented to person, place, and time  He appears well-developed and well-nourished  No distress  HENT:   Head: Normocephalic and atraumatic  Eyes: Conjunctivae are normal    Neck: Normal range of motion  Neck supple  No thyromegaly present     Cardiovascular: Normal rate, regular rhythm and normal heart sounds  Exam reveals no gallop and no friction rub  No murmur heard  Pulmonary/Chest: Effort normal and breath sounds normal  No stridor  No respiratory distress  He has no wheezes  He has no rales  Musculoskeletal:        Hands:  Lymphadenopathy:     He has no cervical adenopathy  Neurological: He is alert and oriented to person, place, and time  Skin: He is not diaphoretic  Psychiatric: He has a normal mood and affect  His behavior is normal  Judgment and thought content normal    Nursing note and vitals reviewed

## 2019-10-21 ENCOUNTER — TELEPHONE (OUTPATIENT)
Dept: FAMILY MEDICINE CLINIC | Facility: CLINIC | Age: 44
End: 2019-10-21

## 2019-10-21 NOTE — TELEPHONE ENCOUNTER
PT CALLED ABOUT LUMP BETWEEN THUMB AND INDEX FINGER LEFT HAND  ITS GETTING BIGGER AND IS UNCOMFORTABLE AND WAS PAINFUL YESTERDAY (Sunday) PT WAS SCHEDULED FOR SURGERY HOWEVER HIS SUGAR IS TO HIGH SO HE COULDN'T HAVE IT   PT ASKED IF YOU COULD SEE HIM TODAY FOR 3 OR LATER PLS ADVISE

## 2019-10-24 ENCOUNTER — OFFICE VISIT (OUTPATIENT)
Dept: FAMILY MEDICINE CLINIC | Facility: CLINIC | Age: 44
End: 2019-10-24
Payer: COMMERCIAL

## 2019-10-24 VITALS
HEART RATE: 105 BPM | DIASTOLIC BLOOD PRESSURE: 98 MMHG | RESPIRATION RATE: 16 BRPM | TEMPERATURE: 97.5 F | BODY MASS INDEX: 26.9 KG/M2 | SYSTOLIC BLOOD PRESSURE: 140 MMHG | OXYGEN SATURATION: 97 % | WEIGHT: 180.8 LBS

## 2019-10-24 DIAGNOSIS — R22.32 MASS OF HAND, LEFT: Primary | ICD-10-CM

## 2019-10-24 PROCEDURE — 99213 OFFICE O/P EST LOW 20 MIN: CPT | Performed by: FAMILY MEDICINE

## 2019-10-24 RX ORDER — CEPHALEXIN 500 MG/1
500 CAPSULE ORAL EVERY 6 HOURS SCHEDULED
Qty: 40 CAPSULE | Refills: 0 | Status: SHIPPED | OUTPATIENT
Start: 2019-10-24 | End: 2019-11-03

## 2019-10-24 NOTE — PROGRESS NOTES
Assessment/Plan:    No problem-specific Assessment & Plan notes found for this encounter  Diagnoses and all orders for this visit:    Mass of hand, left  Comments:  Pt stable  Unclear etiology of lesion - and has gotten bigger  Starting Cephalexin, and precautions given to the pt  Pt will contact Ortho, & I will message  Orders:  -     cephalexin (KEFLEX) 500 mg capsule; Take 1 capsule (500 mg total) by mouth every 6 (six) hours for 10 days          Subjective:      Patient ID: Lynn Joseph is a 38 y o  male  Trenna Stacy recently was scheduled for excision of a cystic structure on the left hand - this was put on hold due to elevated sugars; meds were adjusted - and BG is improving nicely so far  The mass has gotten bigger by pt report, and more sore in the last week  No fevers  No trauma to the spot  Pt is right hand dominant  He is a  by trade  The following portions of the patient's history were reviewed and updated as appropriate: allergies, current medications, past family history, past social history, past surgical history and problem list     Review of Systems   Constitutional: Negative for activity change and fever  Musculoskeletal:        Left hand pain and cyst            Objective:      /98   Pulse 105   Temp 97 5 °F (36 4 °C)   Resp 16   Wt 82 kg (180 lb 12 8 oz)   SpO2 97%   BMI 26 90 kg/m²          Physical Exam   Constitutional: He is oriented to person, place, and time  He appears well-developed and well-nourished  No distress  HENT:   Head: Normocephalic and atraumatic  Musculoskeletal:        Hands:  Neurological: He is alert and oriented to person, place, and time  Skin: He is not diaphoretic  Psychiatric: His behavior is normal  Judgment and thought content normal    Nursing note and vitals reviewed

## 2019-10-25 ENCOUNTER — TELEPHONE (OUTPATIENT)
Dept: OBGYN CLINIC | Facility: HOSPITAL | Age: 44
End: 2019-10-25

## 2019-10-25 NOTE — TELEPHONE ENCOUNTER
Patient called back  He is afebrile  Stated the mass looks angry  Nothing oozing from the area  This started on Sunday night and began to really bother him Monday  Calling today for appointment  Patient has been scheduled for evaluation on Monday at 4:30 in [facility]  Advised that if fever begins, increased redness, swelling, warmth to touch, or oozing from the area begins, he is to be evaluated in ED or Urgent care  Patient verbalized understanding   Thank you

## 2019-10-25 NOTE — TELEPHONE ENCOUNTER
LM on  for patient to call nurse back  Will offer same day Monday in [facility] if patient willing to travel

## 2019-10-25 NOTE — TELEPHONE ENCOUNTER
Patient is calling    [phone]    Patient is calling because the lump between his lt hand thumb & index finger has gotten bigger  Patient is stating that it is red, swollen & very sore to the touch  Patient is stating that it is hard for him to grasp the steering wheel or sweep with a broom  Patient did have sx scheduled the beginning of October but it was cancelled due to his blood sugars  Patient would like to know what he should do

## 2019-10-28 ENCOUNTER — OFFICE VISIT (OUTPATIENT)
Dept: OBGYN CLINIC | Facility: CLINIC | Age: 44
End: 2019-10-28
Payer: COMMERCIAL

## 2019-10-28 VITALS
SYSTOLIC BLOOD PRESSURE: 119 MMHG | BODY MASS INDEX: 27.4 KG/M2 | HEIGHT: 69 IN | DIASTOLIC BLOOD PRESSURE: 83 MMHG | HEART RATE: 106 BPM | WEIGHT: 185 LBS

## 2019-10-28 DIAGNOSIS — R22.32 MASS OF LEFT HAND: Primary | ICD-10-CM

## 2019-10-28 PROCEDURE — 99214 OFFICE O/P EST MOD 30 MIN: CPT | Performed by: ORTHOPAEDIC SURGERY

## 2019-10-28 NOTE — PROGRESS NOTES
ASSESSMENT/PLAN:    Assessment:   Left hand mass    Plan:   The patient will be rescheduled for left hand mass excision under general   The patient was advised to go for his hemoglobin A1c 2 weeks prior to surgery  This will be evaluated prior to surgery  The patient expressed understanding  Follow Up: After Surgery    To Do Next Visit:    and Sutures out    General Discussions:       Operative Discussions:     Standard Consent: The risks and benefits of the procedure were explained to the patient, which include, but are not limited to: Bleeding, infection, recurrence, pain, scar, damage to tendons, damage to nerves, and damage to blood vessels, failure to give desired results and complications related to anesthesia  These risks, along with alternative conservative treatment options, and postoperative protocols were voiced back and understood by the patient  All questions were answered to the patient's satisfaction  The patient agrees to comply with a standard postoperative protocol, and is willing to proceed  Education was provided via written and auditory forms  There were no barriers to learning  Written handouts regarding wound care, incision and scar care, and general preoperative information was provided to the patient  Prior to surgery, the patient may be requested to stop all anti-inflammatory medications  Prophylactic aspirin, Plavix, and Coumadin may be allowed to be continued  Medications including vitamin E , ginkgo, and fish oil are requested to be stopped approximately one week prior to surgery  Hypertensive medications and beta blockers, if taken, should be continued  Diabetes: The risks of diabetes were discussed with the patient  These risks include increased risk of infection, delayed wound healing, increased swelling, increased stiffness, and increased scar formation    While these risks are generally increased in all diabetics, keeping one's blood sugar under strict control can help decrease problems after surgery  If blood sugar levels are too high, or hemoglobin A1c levels are too high, surgery may be delayed or canceled  _____________________________________________________  CHIEF COMPLAINT:  Chief Complaint   Patient presents with    Left Hand - Mass, Pain, Swelling         SUBJECTIVE:  Bailey Bettencourt is a 38 y o  male who presents for follow up regarding left hand mass  Since last visit, Bailey Bettencourt has tried Resume activities as tolerated without relief  Patient's surgery that was scheduled for 10/03/2019 was canceled due to his uncontrolled diabetes  Today there is Pain  Moderate  Constant  Dull and Aching to the left hand  Patient states he has been testing his blood sugars twice a day in the usually range between 130 and 160  Patient does have of hemoglobin A1c ordered from his primary care physician that he is going to get done prior to his follow-up with his PCP on 11/18/2019  Radiation: Yes to the  hand  Associated symptoms: No Complaints    PAST MEDICAL HISTORY:  Past Medical History:   Diagnosis Date    Diabetes mellitus ([facility] [address] )        PAST SURGICAL HISTORY:  History reviewed  No pertinent surgical history      FAMILY HISTORY:  Family History   Problem Relation Age of Onset    Hypertension Mother     Diabetes Father        SOCIAL HISTORY:  Social History     Tobacco Use    Smoking status: Current Every Day Smoker     Packs/day: 0 50     Types: Cigarettes    Smokeless tobacco: Never Used   Substance Use Topics    Alcohol use: Yes     Comment: Social    Drug use: No       MEDICATIONS:    Current Outpatient Medications:     cephalexin (KEFLEX) 500 mg capsule, Take 1 capsule (500 mg total) by mouth every 6 (six) hours for 10 days, Disp: 40 capsule, Rfl: 0    Empagliflozin 10 MG TABS, Take 1 tablet (10 mg total) by mouth every morning, Disp: 30 tablet, Rfl: 5    escitalopram (LEXAPRO) 10 mg tablet, Take 1 tablet (10 mg total) by mouth daily, Disp: 90 tablet, Rfl: 1    sildenafil (VIAGRA) 50 MG tablet, Take 1 tablet (50 mg total) by mouth daily as needed for erectile dysfunction, Disp: 10 tablet, Rfl: 0    atorvastatin (LIPITOR) 10 mg tablet, Take 1 tablet (10 mg total) by mouth daily at bedtime for 90 days, Disp: 90 tablet, Rfl: 1    metFORMIN (GLUCOPHAGE) 1000 MG tablet, Take 1 tablet (1,000 mg total) by mouth 2 (two) times a day with meals for 90 days, Disp: 180 tablet, Rfl: 1    sitaGLIPtin (JANUVIA) 100 mg tablet, Take 1 tablet (100 mg total) by mouth daily for 90 days, Disp: 90 tablet, Rfl: 1    ALLERGIES:  No Known Allergies    REVIEW OF SYSTEMS:  Pertinent items are noted in HPI      LABS:  HgA1c:   Lab Results   Component Value Date    HGBA1C 9 2 (H) 08/17/2019     BMP:   Lab Results   Component Value Date    CALCIUM 8 9 08/17/2019    K 3 6 08/17/2019    CO2 29 08/17/2019     08/17/2019    BUN 8 08/17/2019    CREATININE 0 66 08/17/2019           _____________________________________________________  PHYSICAL EXAMINATION:  Vital signs: /83   Pulse (!) 106   Ht 5' 9" (1 753 m)   Wt 83 9 kg (185 lb)   BMI 27 32 kg/m²   General: well developed and well nourished, alert, oriented times 3 and appears comfortable  Psychiatric: Normal  HEENT: Trachea Midline, No torticollis  Cardiovascular: No discernable arrhythmia  Pulmonary: No wheezing or stridor  Skin: No Erythema  Neurovascular: Sensation Intact to the Median, Ulnar, Radial Nerve, Motor Intact to the Median, Ulnar, Radial Nerve and Pulses Intact    MUSCULOSKELETAL EXAMINATION:  LEFT SIDE:  Hand:  The mass is located to the webspace between his thumb and index finger on the palmar aspect of his hand that is 6 x 2 cm in size and does not transilluminate    _____________________________________________________  STUDIES REVIEWED:  No Studies to review      PROCEDURES PERFORMED:  Procedures  No Procedures performed today   Scribe Attestation    I,:   Lynda Lisa am acting as a scribe while in the presence of the attending physician :        I,:   Aminata Vitale MD personally performed the services described in this documentation    as scribed in my presence :

## 2019-10-28 NOTE — H&P
ASSESSMENT/PLAN:    Assessment:   Left hand mass    Plan:   The patient will be rescheduled for left hand mass excision under general   The patient was advised to go for his hemoglobin A1c 2 weeks prior to surgery  This will be evaluated prior to surgery  The patient expressed understanding  Follow Up: After Surgery    To Do Next Visit:    and Sutures out    General Discussions:       Operative Discussions:     Standard Consent: The risks and benefits of the procedure were explained to the patient, which include, but are not limited to: Bleeding, infection, recurrence, pain, scar, damage to tendons, damage to nerves, and damage to blood vessels, failure to give desired results and complications related to anesthesia  These risks, along with alternative conservative treatment options, and postoperative protocols were voiced back and understood by the patient  All questions were answered to the patient's satisfaction  The patient agrees to comply with a standard postoperative protocol, and is willing to proceed  Education was provided via written and auditory forms  There were no barriers to learning  Written handouts regarding wound care, incision and scar care, and general preoperative information was provided to the patient  Prior to surgery, the patient may be requested to stop all anti-inflammatory medications  Prophylactic aspirin, Plavix, and Coumadin may be allowed to be continued  Medications including vitamin E , ginkgo, and fish oil are requested to be stopped approximately one week prior to surgery  Hypertensive medications and beta blockers, if taken, should be continued  Diabetes: The risks of diabetes were discussed with the patient  These risks include increased risk of infection, delayed wound healing, increased swelling, increased stiffness, and increased scar formation    While these risks are generally increased in all diabetics, keeping one's blood sugar under strict control can help decrease problems after surgery  If blood sugar levels are too high, or hemoglobin A1c levels are too high, surgery may be delayed or canceled  _____________________________________________________  CHIEF COMPLAINT:  Chief Complaint   Patient presents with    Left Hand - Mass, Pain, Swelling         SUBJECTIVE:  Kerry Cagle is a 38 y o  male who presents for follow up regarding left hand mass  Since last visit, Kerry Cagle has tried Resume activities as tolerated without relief  Patient's surgery that was scheduled for 10/03/2019 was canceled due to his uncontrolled diabetes  Today there is Pain  Moderate  Constant  Dull and Aching to the left hand  Patient states he has been testing his blood sugars twice a day in the usually range between 130 and 160  Patient does have of hemoglobin A1c ordered from his primary care physician that he is going to get done prior to his follow-up with his PCP on 11/18/2019  Radiation: Yes to the  hand  Associated symptoms: No Complaints    PAST MEDICAL HISTORY:  Past Medical History:   Diagnosis Date    Diabetes mellitus ([facility] [address] )        PAST SURGICAL HISTORY:  History reviewed  No pertinent surgical history      FAMILY HISTORY:  Family History   Problem Relation Age of Onset    Hypertension Mother     Diabetes Father        SOCIAL HISTORY:  Social History     Tobacco Use    Smoking status: Current Every Day Smoker     Packs/day: 0 50     Types: Cigarettes    Smokeless tobacco: Never Used   Substance Use Topics    Alcohol use: Yes     Comment: Social    Drug use: No       MEDICATIONS:    Current Outpatient Medications:     cephalexin (KEFLEX) 500 mg capsule, Take 1 capsule (500 mg total) by mouth every 6 (six) hours for 10 days, Disp: 40 capsule, Rfl: 0    Empagliflozin 10 MG TABS, Take 1 tablet (10 mg total) by mouth every morning, Disp: 30 tablet, Rfl: 5    escitalopram (LEXAPRO) 10 mg tablet, Take 1 tablet (10 mg total) by mouth daily, Disp: 90 tablet, Rfl: 1    sildenafil (VIAGRA) 50 MG tablet, Take 1 tablet (50 mg total) by mouth daily as needed for erectile dysfunction, Disp: 10 tablet, Rfl: 0    atorvastatin (LIPITOR) 10 mg tablet, Take 1 tablet (10 mg total) by mouth daily at bedtime for 90 days, Disp: 90 tablet, Rfl: 1    metFORMIN (GLUCOPHAGE) 1000 MG tablet, Take 1 tablet (1,000 mg total) by mouth 2 (two) times a day with meals for 90 days, Disp: 180 tablet, Rfl: 1    sitaGLIPtin (JANUVIA) 100 mg tablet, Take 1 tablet (100 mg total) by mouth daily for 90 days, Disp: 90 tablet, Rfl: 1    ALLERGIES:  No Known Allergies    REVIEW OF SYSTEMS:  Pertinent items are noted in HPI      LABS:  HgA1c:   Lab Results   Component Value Date    HGBA1C 9 2 (H) 08/17/2019     BMP:   Lab Results   Component Value Date    CALCIUM 8 9 08/17/2019    K 3 6 08/17/2019    CO2 29 08/17/2019     08/17/2019    BUN 8 08/17/2019    CREATININE 0 66 08/17/2019           _____________________________________________________  PHYSICAL EXAMINATION:  Vital signs: /83   Pulse (!) 106   Ht 5' 9" (1 753 m)   Wt 83 9 kg (185 lb)   BMI 27 32 kg/m²    General: well developed and well nourished, alert, oriented times 3 and appears comfortable  Psychiatric: Normal  HEENT: Trachea Midline, No torticollis  Cardiovascular: No discernable arrhythmia  Pulmonary: No wheezing or stridor  Skin: No Erythema  Neurovascular: Sensation Intact to the Median, Ulnar, Radial Nerve, Motor Intact to the Median, Ulnar, Radial Nerve and Pulses Intact    MUSCULOSKELETAL EXAMINATION:  LEFT SIDE:  Hand:  The mass is located to the webspace between his thumb and index finger on the palmar aspect of his hand that is 6 x 2 cm in size and does not transilluminate    _____________________________________________________  STUDIES REVIEWED:  No Studies to review      PROCEDURES PERFORMED:  Procedures  No Procedures performed today

## 2019-11-12 ENCOUNTER — HOSPITAL ENCOUNTER (EMERGENCY)
Facility: HOSPITAL | Age: 44
Discharge: HOME | End: 2019-11-12
Attending: EMERGENCY MEDICINE | Admitting: EMERGENCY MEDICINE
Payer: COMMERCIAL

## 2019-11-12 VITALS
WEIGHT: 186.07 LBS | SYSTOLIC BLOOD PRESSURE: 129 MMHG | HEART RATE: 93 BPM | OXYGEN SATURATION: 96 % | TEMPERATURE: 98.5 F | RESPIRATION RATE: 17 BRPM | DIASTOLIC BLOOD PRESSURE: 91 MMHG | BODY MASS INDEX: 27.48 KG/M2

## 2019-11-12 DIAGNOSIS — L02.91 ABSCESS: Primary | ICD-10-CM

## 2019-11-12 DIAGNOSIS — M79.89 SOFT TISSUE MASS: ICD-10-CM

## 2019-11-12 PROCEDURE — 99214 OFFICE O/P EST MOD 30 MIN: CPT | Performed by: ORTHOPAEDIC SURGERY

## 2019-11-12 PROCEDURE — 99283 EMERGENCY DEPT VISIT LOW MDM: CPT

## 2019-11-12 PROCEDURE — 99283 EMERGENCY DEPT VISIT LOW MDM: CPT | Performed by: PHYSICIAN ASSISTANT

## 2019-11-12 RX ORDER — LIDOCAINE HYDROCHLORIDE 10 MG/ML
10 INJECTION, SOLUTION EPIDURAL; INFILTRATION; INTRACAUDAL; PERINEURAL ONCE
Status: COMPLETED | OUTPATIENT
Start: 2019-11-12 | End: 2019-11-12

## 2019-11-12 RX ORDER — AMOXICILLIN AND CLAVULANATE POTASSIUM 875; 125 MG/1; MG/1
1 TABLET, FILM COATED ORAL EVERY 12 HOURS
Qty: 14 TABLET | Refills: 0 | Status: SHIPPED | OUTPATIENT
Start: 2019-11-12 | End: 2019-11-19

## 2019-11-12 RX ADMIN — LIDOCAINE HYDROCHLORIDE 10 ML: 10 INJECTION, SOLUTION EPIDURAL; INFILTRATION; INTRACAUDAL; PERINEURAL at 10:00

## 2019-11-12 NOTE — CONSULTS
Orthopedics   Jolie Pierre 38 y o  male MRN: [id number]  Unit/Bed#: QCB      Chief Complaint:   left hand swelling and pain    HPI:   44 y o male right hand dominant complaining of left hand swelling and pain  Patient has a history of a left hand mass, which developed approximately 2 years ago  He has been seen in our office in the past, and is scheduled for mass excision in the coming months, once his diabetes is better under control  Over the past 2 days, he has noticed increased swelling in the hand, as well as development of more fluctuance  He denies any fevers  He has no pain with movement of the hand  No numbness or tingling to the hand or fingers  Review Of Systems:   · Skin:   See above  · Neuro: See HPI  · Musculoskeletal: See HPI  · 14 point review of systems negative except as stated above     Past Medical History:   Past Medical History:   Diagnosis Date    [facility] ([facility] )        Past Surgical History:   History reviewed  No pertinent surgical history      Family History:  Family history reviewed and non-contributory  Family History   Problem Relation Age of Onset    Hypertension Mother     Diabetes Father        Social History:  Social History     Socioeconomic History    Marital status: Single     Spouse name: None    Number of children: None    Years of education: None    Highest education level: None   Occupational History    None   Social Needs    Financial resource strain: None    Food insecurity:     Worry: None     Inability: None    Transportation needs:     Medical: None     Non-medical: None   Tobacco Use    Smoking status: Current Every Day Smoker     Packs/day: 0 50     Types: Cigarettes    Smokeless tobacco: Never Used   Substance and Sexual Activity    Alcohol use: Not Currently     Comment: Social    Drug use: No    Sexual activity: Yes     Partners: Female   Lifestyle    Physical activity:     Days per week: None     Minutes per session: None    Stress: None   Relationships    Social connections:     Talks on phone: None     Gets together: None     Attends Jew service: None     Active member of club or organization: None     Attends meetings of clubs or organizations: None     Relationship status: None    Intimate partner violence:     Fear of current or ex partner: None     Emotionally abused: None     Physically abused: None     Forced sexual activity: None   Other Topics Concern    None   Social History Narrative    None       Allergies:   No Known Allergies        Labs:  0   Lab Value Date/Time    HCT 45 2 08/17/2019 0715    HCT 48 1 01/15/2019 0938    HCT 42 7 07/28/2018 0733    HGB 16 0 08/17/2019 0715    HGB 17 2 (H) 01/15/2019 0938    HGB 15 8 07/28/2018 0733    WBC 6 57 08/17/2019 0715    WBC 6 11 01/15/2019 0938    WBC 5 57 07/28/2018 0733       Meds:  No current facility-administered medications for this encounter  Current Outpatient Medications:     atorvastatin (LIPITOR) 10 mg tablet, Take 1 tablet (10 mg total) by mouth daily at bedtime for 90 days, Disp: 90 tablet, Rfl: 1    Empagliflozin 10 MG TABS, Take 1 tablet (10 mg total) by mouth every morning, Disp: 30 tablet, Rfl: 5    escitalopram (LEXAPRO) 10 mg tablet, Take 1 tablet (10 mg total) by mouth daily, Disp: 90 tablet, Rfl: 1    metFORMIN (GLUCOPHAGE) 1000 MG tablet, Take 1 tablet (1,000 mg total) by mouth 2 (two) times a day with meals for 90 days, Disp: 180 tablet, Rfl: 1    sildenafil (VIAGRA) 50 MG tablet, Take 1 tablet (50 mg total) by mouth daily as needed for erectile dysfunction, Disp: 10 tablet, Rfl: 0    sitaGLIPtin (JANUVIA) 100 mg tablet, Take 1 tablet (100 mg total) by mouth daily for 90 days, Disp: 90 tablet, Rfl: 1    Blood Culture:   No results found for: BLOODCX    Wound Culture:   No results found for: WOUNDCULT    Ins and Outs:  No intake/output data recorded            Physical Exam:   /91   Pulse 93   Temp 98 5 °F (36 9 °C) (Oral)   Resp 17   Wt 84 4 kg (186 lb 1 1 oz)   SpO2 96%   BMI 27 48 kg/m²   Gen: Alert and oriented to person, place, time  HEENT: EOMI, eyes clear, moist mucus membranes, hearing intact  Respiratory: Bilateral chest rise  No audible wheezing found  Cardiovascular: Regular Rate and Rhythm  Abdomen: soft nontender/nondistended  · Musculoskeletal: left Upper Extremity  · Skin:   3 cm palpable mass over the radial, volar aspect of the palm, just proximal to the flexion crease, and in-line with the index finger  There is no surrounding erythema, but has some scaling of the skin  The mass is fluctuant, and comes to a point/pustule at the apex of the mass  · Mass does not transilluminate, mass does not move with index finger flexion  · No pain with range of motion at the wrist, MCPs, PIP is, or DIP is of any fingers  · Sensation intact Radial, Ulna and Median  · Motor intact median, radial, ulnar, ain, and pin distributions  · 2+ radial pulse    Radiology:   No imaging this encounter    _*_*_*_*_*_*_*_*_*_*_*_*_*_*_*_*_*_*_*_*_*_*_*_*_*_*_*_*_*_*_*_*_*_*_*_*_*_*_*_*_*    Procedure: L hand I&D  Skin overlying the mass was prepped with chlorhexidine  Once area was sterile, the lesion was anesthetized with 3 cc of 1% lidocaine without epi using a 25 gauge needle  That point an 11 blade scalpel was used to incise the mass in a longitudinal fashion  At that time large amount of thick white fluid was expressed from the mass  Hand was then washed out with a L of sterile saline, and a sterile dressing was applied  Patient was neurovascularly intact both pre and post procedure     _*_*_*_*_*_*_*_*_*_*_*_*_*_*_*_*_*_*_*_*_*_*_*_*_*_*_*_*_*_*_*_*_*_*_*_*_*_*_*_*_*    Assessment:  44 y o male with  left hand mass, likely ganglion cyst, which has become infected  Patient has undergone bedside I& D  Patient was instructed to return to the office in 1 week for repeat examination    In the meantime patient should do Betadine soaks 3 times a day, and should complete a one-week course of p o  Augmentin  Plan:   · Light use left upper extremity  · One week Augmentin p o    · Betadine soaks t i d   · Heat to affected area  · Analgesics for pain  · Dispo: [id number] Nahomy Cavanaugh for discharge from ortho perspective      Ronny Biswas MD

## 2019-11-12 NOTE — DISCHARGE INSTRUCTIONS
Betadine soaks= 50% betadine 50% water soaks 3 times daily   Follow up with Dr Chente Ochoa in 1 week  Take antibiotics as prescribed  Return to ER if worsening pain, swelling, redness, development of fevers, chills, N/V/D

## 2019-11-12 NOTE — ED PROVIDER NOTES
History  Chief Complaint   Patient presents with    Mass     L hand cyst  Pain with movement/palpation  Denies fluid leaking from area  38 y o  Male presents with worsening pain, swelling and redness of chronic left thenar emminence mass  States he is scheduled for excision with Dr Burl Kocher in mid January  + history of diabetes, states sugars have been below 200 routinely  Denies fevers, chills, pain, drainage,           Prior to Admission Medications   Prescriptions Last Dose Informant Patient Reported? Taking? Empagliflozin 10 MG TABS 11/11/2019 at Unknown time  No Yes   Sig: Take 1 tablet (10 mg total) by mouth every morning   atorvastatin (LIPITOR) 10 mg tablet   No Yes   Sig: Take 1 tablet (10 mg total) by mouth daily at bedtime for 90 days   escitalopram (LEXAPRO) 10 mg tablet 11/11/2019 at Unknown time  No Yes   Sig: Take 1 tablet (10 mg total) by mouth daily   metFORMIN (GLUCOPHAGE) 1000 MG tablet   No Yes   Sig: Take 1 tablet (1,000 mg total) by mouth 2 (two) times a day with meals for 90 days   sildenafil (VIAGRA) 50 MG tablet More than a month at Unknown time  No No   Sig: Take 1 tablet (50 mg total) by mouth daily as needed for erectile dysfunction   sitaGLIPtin (JANUVIA) 100 mg tablet   No Yes   Sig: Take 1 tablet (100 mg total) by mouth daily for 90 days      Facility-Administered Medications: None       Past Medical History:   Diagnosis Date    Diabetes mellitus ([facility] )        History reviewed  No pertinent surgical history  Family History   Problem Relation Age of Onset    Hypertension Mother     Diabetes Father      I have reviewed and agree with the history as documented  Social History     Tobacco Use    Smoking status: Current Every Day Smoker     Packs/day: 0 50     Types: Cigarettes    Smokeless tobacco: Never Used   Substance Use Topics    Alcohol use: Not Currently     Comment: Social    Drug use: No        Review of Systems   Musculoskeletal: Positive for myalgias     All other systems reviewed and are negative  Physical Exam  Physical Exam   Constitutional: He is oriented to person, place, and time  He appears well-developed and well-nourished  HENT:   Head: Normocephalic and atraumatic  Nose: Nose normal    Eyes: Conjunctivae and EOM are normal    Neck: Normal range of motion  Neck supple  Cardiovascular: Intact distal pulses  Pulmonary/Chest: Effort normal and breath sounds normal    Abdominal: Soft  Musculoskeletal: Normal range of motion  Neurological: He is alert and oriented to person, place, and time  Skin: Skin is warm  Capillary refill takes less than 2 seconds  Lesion noted  There is erythema  Psychiatric: He has a normal mood and affect  His behavior is normal  Judgment and thought content normal    Nursing note and vitals reviewed        Vital Signs  ED Triage Vitals   Temperature Pulse Respirations Blood Pressure SpO2   11/12/19 0844 11/12/19 0845 11/12/19 0844 11/12/19 0845 11/12/19 0845   98 5 °F (36 9 °C) 93 17 129/91 96 %      Temp Source Heart Rate Source Patient Position - Orthostatic VS BP Location FiO2 (%)   11/12/19 0844 -- -- -- --   Oral          Pain Score       11/12/19 0844       4           Vitals:    11/12/19 0845   BP: 129/91   Pulse: 93         Visual Acuity      ED Medications  Medications   lidocaine (PF) (XYLOCAINE-MPF) 1 % injection 10 mL (10 mL Infiltration Given 11/12/19 1000)       Diagnostic Studies  Results Reviewed     None                 No orders to display              Procedures  Procedures       ED Course                               MDM  Number of Diagnoses or Management Options  Abscess: new and requires workup  Soft tissue mass: new and requires workup  Diagnosis management comments: Ortho on call at the bedside for evaluation  They will I&D at bedside, follow up with Dr Alton Guadarrama in 1 week  Augmentin x1 week  Betadine soaks TID as instructed by ortho      Disposition  Final diagnoses:   Abscess   Soft tissue mass     Time reflects when diagnosis was documented in both MDM as applicable and the Disposition within this note     Time User Action Codes Description Comment    11/12/2019 10:01 AM Cindy Cárdenas Add [L02 91] Abscess     11/12/2019 10:01 AM Cindy Cárdenas Add [M79 89] Soft tissue mass       ED Disposition     ED Disposition Condition Date/Time Comment    Discharge Stable Tue Nov 12, 2019 10:00 AM Bailey Bettencourt discharge to home/self care              Follow-up Information     Follow up With Specialties Details Why [address] [facility] [address], [address]   [address] [facility]  [address] [facility] [place] [address] [facility]      Roberto Edmonds MD Orthopedic Surgery, Hand Surgery In 1 week  [address]  [phone]            Discharge Medication List as of 11/12/2019 10:15 AM      START taking these medications    Details   amoxicillin-clavulanate (AUGMENTIN) 875-125 mg per tablet Take 1 tablet by mouth every 12 (twelve) hours for 7 days, Starting Tue 11/12/2019, Until Tue 11/19/2019, Print         CONTINUE these medications which have NOT CHANGED    Details   atorvastatin (LIPITOR) 10 mg tablet Take 1 tablet (10 mg total) by mouth daily at bedtime for 90 days, Starting Wed 7/17/2019, Until Tue 11/12/2019, Normal      Empagliflozin 10 MG TABS Take 1 tablet (10 mg total) by mouth every morning, Starting Tue 10/8/2019, Normal      escitalopram (LEXAPRO) 10 mg tablet Take 1 tablet (10 mg total) by mouth daily, Starting Wed 7/17/2019, Normal      metFORMIN (GLUCOPHAGE) 1000 MG tablet Take 1 tablet (1,000 mg total) by mouth 2 (two) times a day with meals for 90 days, Starting Wed 7/17/2019, Until Tue 11/12/2019, Normal      sitaGLIPtin (JANUVIA) 100 mg tablet Take 1 tablet (100 mg total) by mouth daily for 90 days, Starting Wed 7/17/2019, Until Tue 11/12/2019, Normal      sildenafil (VIAGRA) 50 MG tablet Take 1 tablet (50 mg total) by mouth daily as needed for erectile dysfunction, Starting Tue 10/8/2019, Normal           No discharge procedures on file      ED Provider  Electronically Signed by           Cr Mitchell PA-C  11/12/19 8935

## 2019-11-14 ENCOUNTER — APPOINTMENT (OUTPATIENT)
Dept: LAB | Facility: HOSPITAL | Age: 44
End: 2019-11-14
Payer: COMMERCIAL

## 2019-11-14 DIAGNOSIS — E78.2 MIXED HYPERLIPIDEMIA: ICD-10-CM

## 2019-11-14 DIAGNOSIS — E11.9 TYPE 2 DIABETES MELLITUS WITHOUT COMPLICATION, WITHOUT LONG-TERM CURRENT USE OF INSULIN (HCC): Chronic | ICD-10-CM

## 2019-11-14 LAB
ALBUMIN SERPL BCP-MCNC: 4 G/DL (ref 3.5–5)
ALP SERPL-CCNC: 73 U/L (ref 46–116)
ALT SERPL W P-5'-P-CCNC: 29 U/L (ref 12–78)
ANION GAP SERPL CALCULATED.3IONS-SCNC: 10 MMOL/L (ref 4–13)
AST SERPL W P-5'-P-CCNC: 14 U/L (ref 5–45)
BILIRUB SERPL-MCNC: 0.27 MG/DL (ref 0.2–1)
BUN SERPL-MCNC: 17 MG/DL (ref 5–25)
CALCIUM SERPL-MCNC: 9.3 MG/DL (ref 8.3–10.1)
CHLORIDE SERPL-SCNC: 106 MMOL/L (ref 100–108)
CHOLEST SERPL-MCNC: 197 MG/DL (ref 50–200)
CO2 SERPL-SCNC: 24 MMOL/L (ref 21–32)
CREAT SERPL-MCNC: 0.79 MG/DL (ref 0.6–1.3)
EST. AVERAGE GLUCOSE BLD GHB EST-MCNC: 212 MG/DL
GFR SERPL CREATININE-BSD FRML MDRD: 110 ML/MIN/1.73SQ M
GLUCOSE P FAST SERPL-MCNC: 147 MG/DL (ref 65–99)
HBA1C MFR BLD: 9 % (ref 4.2–6.3)
HDLC SERPL-MCNC: 53 MG/DL
LDLC SERPL CALC-MCNC: 105 MG/DL (ref 0–100)
POTASSIUM SERPL-SCNC: 3.8 MMOL/L (ref 3.5–5.3)
PROT SERPL-MCNC: 7.8 G/DL (ref 6.4–8.2)
SODIUM SERPL-SCNC: 140 MMOL/L (ref 136–145)
TRIGL SERPL-MCNC: 194 MG/DL

## 2019-11-14 PROCEDURE — 36415 COLL VENOUS BLD VENIPUNCTURE: CPT

## 2019-11-14 PROCEDURE — 83036 HEMOGLOBIN GLYCOSYLATED A1C: CPT

## 2019-11-14 PROCEDURE — 80053 COMPREHEN METABOLIC PANEL: CPT

## 2019-11-14 PROCEDURE — 80061 LIPID PANEL: CPT

## 2019-11-18 ENCOUNTER — OFFICE VISIT (OUTPATIENT)
Dept: FAMILY MEDICINE CLINIC | Facility: CLINIC | Age: 44
End: 2019-11-18
Payer: COMMERCIAL

## 2019-11-18 ENCOUNTER — TELEPHONE (OUTPATIENT)
Dept: OBGYN CLINIC | Facility: CLINIC | Age: 44
End: 2019-11-18

## 2019-11-18 VITALS
SYSTOLIC BLOOD PRESSURE: 142 MMHG | OXYGEN SATURATION: 98 % | WEIGHT: 185.8 LBS | HEART RATE: 99 BPM | BODY MASS INDEX: 27.44 KG/M2 | RESPIRATION RATE: 16 BRPM | TEMPERATURE: 97.7 F | DIASTOLIC BLOOD PRESSURE: 98 MMHG

## 2019-11-18 DIAGNOSIS — I10 ESSENTIAL HYPERTENSION: ICD-10-CM

## 2019-11-18 DIAGNOSIS — L02.91 ABSCESS: ICD-10-CM

## 2019-11-18 DIAGNOSIS — E78.2 MIXED HYPERLIPIDEMIA: ICD-10-CM

## 2019-11-18 DIAGNOSIS — R22.32 MASS OF LEFT HAND: Primary | ICD-10-CM

## 2019-11-18 DIAGNOSIS — E11.9 TYPE 2 DIABETES MELLITUS WITHOUT COMPLICATION, WITHOUT LONG-TERM CURRENT USE OF INSULIN (HCC): Chronic | ICD-10-CM

## 2019-11-18 PROCEDURE — 99214 OFFICE O/P EST MOD 30 MIN: CPT | Performed by: FAMILY MEDICINE

## 2019-11-18 PROCEDURE — 4010F ACE/ARB THERAPY RXD/TAKEN: CPT | Performed by: FAMILY MEDICINE

## 2019-11-18 RX ORDER — ATORVASTATIN CALCIUM 20 MG/1
20 TABLET, FILM COATED ORAL DAILY
Qty: 30 TABLET | Refills: 5 | Status: SHIPPED | OUTPATIENT
Start: 2019-11-18 | End: 2020-05-27 | Stop reason: SDUPTHER

## 2019-11-18 RX ORDER — LISINOPRIL 10 MG/1
10 TABLET ORAL DAILY
Qty: 30 TABLET | Refills: 5 | Status: SHIPPED | OUTPATIENT
Start: 2019-11-18

## 2019-11-18 NOTE — TELEPHONE ENCOUNTER
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

## 2019-11-18 NOTE — TELEPHONE ENCOUNTER
Destinee Mcclure- patient was seen in ED on 11/12/19 can we get him into the office 7-10 days after this? Thanks!

## 2019-11-18 NOTE — PROGRESS NOTES
Assessment/Plan:    No problem-specific Assessment & Plan notes found for this encounter  Diagnoses and all orders for this visit:    Mass of left hand  Comments:  Mass/abscess - pt stable on exam; feeling better  He is to finish the Augmentin, and f/u with Ortho as planned  Precautions given  Abscess  Comments:  As above  Type 2 diabetes mellitus without complication, without long-term current use of insulin (HCC)  Comments:  Pt feeling better, but A1c needs improvement -> increase Jardiance to 25mg QD  Precautions given  Continue lower carb/salt diet, get reg exercise  Orders:  -     Empagliflozin (JARDIANCE) 25 MG TABS; Take 1 tablet (25 mg total) by mouth every morning  -     Comprehensive metabolic panel; Future  -     HEMOGLOBIN A1C W/ EAG ESTIMATION; Future    Mixed hyperlipidemia  Comments:  Increasing Atorvastatin does - LDl needs to be < 100  FBW to be repeated prior to next OV  Orders:  -     atorvastatin (LIPITOR) 20 mg tablet; Take 1 tablet (20 mg total) by mouth daily  -     Lipid Panel with Direct LDL reflex; Future    Essential hypertension  Comments: Will start the pt on low dose Lisinopril at this time  Orders:  -     lisinopril (ZESTRIL) 10 mg tablet; Take 1 tablet (10 mg total) by mouth daily          Subjective:      Patient ID: Tash Bourne is a 38 y o  male  Ruthanncristina Joy is in f/u on the mass of his left hand  We saw him in f/u 10/24/19 - started on Cephalexin  Went to the ER 11/12/19 with increased swelling at the area, and I&D was performed there  He was switched to Augmentin  Sees Dr Nhi Martinez of Ortho in f/u 12/6/19  Original surgery delayed due to elevated BG    Recent labs:  A1c now 9 0%, , LFTs normal         The following portions of the patient's history were reviewed and updated as appropriate: allergies, current medications, past family history, past social history, past surgical history and problem list     Past Medical History:   Diagnosis Date    Diabetes mellitus ([facility] )        Current Outpatient Medications:     amoxicillin-clavulanate (AUGMENTIN) 875-125 mg per tablet, Take 1 tablet by mouth every 12 (twelve) hours for 7 days, Disp: 14 tablet, Rfl: 0    atorvastatin (LIPITOR) 20 mg tablet, Take 1 tablet (20 mg total) by mouth daily, Disp: 30 tablet, Rfl: 5    Empagliflozin (JARDIANCE) 25 MG TABS, Take 1 tablet (25 mg total) by mouth every morning, Disp: 30 tablet, Rfl: 5    escitalopram (LEXAPRO) 10 mg tablet, Take 1 tablet (10 mg total) by mouth daily, Disp: 90 tablet, Rfl: 1    lisinopril (ZESTRIL) 10 mg tablet, Take 1 tablet (10 mg total) by mouth daily, Disp: 30 tablet, Rfl: 5    metFORMIN (GLUCOPHAGE) 1000 MG tablet, Take 1 tablet (1,000 mg total) by mouth 2 (two) times a day with meals for 90 days, Disp: 180 tablet, Rfl: 1    sildenafil (VIAGRA) 50 MG tablet, Take 1 tablet (50 mg total) by mouth daily as needed for erectile dysfunction, Disp: 10 tablet, Rfl: 0    sitaGLIPtin (JANUVIA) 100 mg tablet, Take 1 tablet (100 mg total) by mouth daily for 90 days, Disp: 90 tablet, Rfl: 1    No Known Allergies    Social History     Tobacco Use    Smoking status: Current Every Day Smoker     Packs/day: 0 50     Types: Cigarettes    Smokeless tobacco: Never Used   Substance Use Topics    Alcohol use: Not Currently     Comment: Social    Drug use: No       Review of Systems   Constitutional: Negative for activity change and fever  Respiratory: Negative for shortness of breath  Cardiovascular: Negative for chest pain  Endocrine:        No low BG readings; overall, readings improved  Musculoskeletal: Negative for myalgias  +Mass left hand  Objective:      /98   Pulse 99   Temp 97 7 °F (36 5 °C)   Resp 16   Wt 84 3 kg (185 lb 12 8 oz)   SpO2 98%   BMI 27 44 kg/m²   Repeat BP = 132/94 RA seated  Physical Exam   Constitutional: He is oriented to person, place, and time   He appears well-developed and well-nourished  No distress  HENT:   Head: Normocephalic and atraumatic  Eyes: Conjunctivae are normal    Neck: Normal range of motion  Neck supple  No thyromegaly present  Cardiovascular: Normal rate, regular rhythm and normal heart sounds  Exam reveals no gallop and no friction rub  No murmur heard  Pulmonary/Chest: Effort normal and breath sounds normal  No stridor  No respiratory distress  He has no wheezes  He has no rales  Musculoskeletal:        Hands:  Lymphadenopathy:     He has no cervical adenopathy  Neurological: He is alert and oriented to person, place, and time  Skin: He is not diaphoretic  Psychiatric: He has a normal mood and affect  His behavior is normal  Judgment and thought content normal    Nursing note and vitals reviewed

## 2019-12-06 ENCOUNTER — OFFICE VISIT (OUTPATIENT)
Dept: OBGYN CLINIC | Facility: HOSPITAL | Age: 44
End: 2019-12-06
Payer: COMMERCIAL

## 2019-12-06 VITALS
DIASTOLIC BLOOD PRESSURE: 88 MMHG | SYSTOLIC BLOOD PRESSURE: 133 MMHG | HEART RATE: 90 BPM | BODY MASS INDEX: 27.4 KG/M2 | HEIGHT: 69 IN | WEIGHT: 185 LBS

## 2019-12-06 DIAGNOSIS — R22.32 MASS OF LEFT HAND: Primary | ICD-10-CM

## 2019-12-06 PROCEDURE — 99214 OFFICE O/P EST MOD 30 MIN: CPT | Performed by: ORTHOPAEDIC SURGERY

## 2019-12-06 NOTE — LETTER
December 6, 2019     Patient: Kyra Ramos   YOB: 1975   Date of Visit: 12/6/2019       To Whom it May Concern:    Mesha Lira is under my professional care  He was seen in my office on 12/6/2019  He  Return to work today  If you have any questions or concerns, please don't hesitate to call           Sincerely,          Freddy Lacy MD        CC: No Recipients

## 2019-12-06 NOTE — LETTER
December 6, 2019     Patient: Alexandria Toscano   YOB: 1975   Date of Visit: 12/6/2019       To Whom it May Concern:    Korin Dawsonelvis is under my professional care  He was seen in my office on 12/6/2019  He will remain out of work from 1/14/20 - 1/19/20  May return to work on 1/20/20    If you have any questions or concerns, please don't hesitate to call           Sincerely,          Kimberley Barrera MD        CC: No Recipients

## 2019-12-06 NOTE — H&P
ASSESSMENT/PLAN:    Assessment:   Left hand mass    Plan:   Proceed with scheduled surgery 1/14/20  The patient was advised to go for his hemoglobin A1c 2 weeks prior to surgery  Follow Up: After Surgery    To Do Next Visit:  Sutures out    Operative Discussions:     Standard Consent: The risks and benefits of the procedure were explained to the patient, which include, but are not limited to: Bleeding, infection, recurrence, pain, scar, damage to tendons, damage to nerves, and damage to blood vessels, failure to give desired results and complications related to anesthesia  These risks, along with alternative conservative treatment options, and postoperative protocols were voiced back and understood by the patient  All questions were answered to the patient's satisfaction  The patient agrees to comply with a standard postoperative protocol, and is willing to proceed  Education was provided via written and auditory forms  There were no barriers to learning  Written handouts regarding wound care, incision and scar care, and general preoperative information was provided to the patient  Prior to surgery, the patient may be requested to stop all anti-inflammatory medications  Prophylactic aspirin, Plavix, and Coumadin may be allowed to be continued  Medications including vitamin E , ginkgo, and fish oil are requested to be stopped approximately one week prior to surgery  Hypertensive medications and beta blockers, if taken, should be continued  _____________________________________________________  CHIEF COMPLAINT:  Chief Complaint   Patient presents with    Left Hand - Follow-up         SUBJECTIVE:  Duane Vega is a 38y o  year old male who presents for follow up regarding left hand mass  Patient was scheduled for surgical excision 10/03/19 but was cancelled due to uncontrol diabetes  Sugery was then rescheduled for 1/14/20    Since his last visit he developed increases swelling and erythema at which point he presented to the ED  I&D was performed while in the ED and he was placed on Augmentin for 1 week  Since that time the patient notes a marked improvement in his symptoms  Today denies any pain and states he has full ROM  Patient states it has drained a couple of times since the I&D  PAST MEDICAL HISTORY:  Past Medical History:   Diagnosis Date    Diabetes mellitus ([address] )        PAST SURGICAL HISTORY:  No past surgical history on file  FAMILY HISTORY:  Family History   Problem Relation Age of Onset    Hypertension Mother     Diabetes Father        SOCIAL HISTORY:  Social History     Tobacco Use    Smoking status: Current Every Day Smoker     Packs/day: 0 50     Types: Cigarettes    Smokeless tobacco: Never Used   Substance Use Topics    Alcohol use: Not Currently     Comment: Social    Drug use: No       MEDICATIONS:    Current Outpatient Medications:     atorvastatin (LIPITOR) 20 mg tablet, Take 1 tablet (20 mg total) by mouth daily, Disp: 30 tablet, Rfl: 5    Empagliflozin (JARDIANCE) 25 MG TABS, Take 1 tablet (25 mg total) by mouth every morning, Disp: 30 tablet, Rfl: 5    escitalopram (LEXAPRO) 10 mg tablet, Take 1 tablet (10 mg total) by mouth daily, Disp: 90 tablet, Rfl: 1    lisinopril (ZESTRIL) 10 mg tablet, Take 1 tablet (10 mg total) by mouth daily, Disp: 30 tablet, Rfl: 5    sildenafil (VIAGRA) 50 MG tablet, Take 1 tablet (50 mg total) by mouth daily as needed for erectile dysfunction, Disp: 10 tablet, Rfl: 0    metFORMIN (GLUCOPHAGE) 1000 MG tablet, Take 1 tablet (1,000 mg total) by mouth 2 (two) times a day with meals for 90 days, Disp: 180 tablet, Rfl: 1    sitaGLIPtin (JANUVIA) 100 mg tablet, Take 1 tablet (100 mg total) by mouth daily for 90 days, Disp: 90 tablet, Rfl: 1    ALLERGIES:  No Known Allergies    REVIEW OF SYSTEMS:  Pertinent items are noted in HPI  A comprehensive review of systems was negative      LABS:  HgA1c:   Lab Results   Component Value Date HGBA1C 9 0 (H) 11/14/2019     BMP:   Lab Results   Component Value Date    CALCIUM 9 3 11/14/2019    K 3 8 11/14/2019    CO2 24 11/14/2019     11/14/2019    BUN 17 11/14/2019    CREATININE 0 79 11/14/2019           _____________________________________________________    PHYSICAL EXAMINATION:  /88   Pulse 90   Ht 5' 9" (1 753 m)   Wt 83 9 kg (185 lb)   BMI 27 32 kg/m²    General: well developed and well nourished, alert, oriented times 3 and appears comfortable  Psychiatric: Normal  HEENT: Trachea Midline, No torticollis  Cardiovascular: No discernable arrhythmia  Pulmonary: No wheezing or stridor  Skin: No Erythema, No Lacerations, No Fluctuation, No Ulcerations  Neurovascular: Sensation Intact to the Median, Ulnar, Radial Nerve, Motor Intact to the Median, Ulnar, Radial Nerve and Pulses Intact    MUSCULOSKELETAL EXAMINATION:  LEFT SIDE:  Hand:  The mass is located to the webspace between his thumb and index finger on the palmar aspect of his hand that is 6 x 2 cm in size and does not transilluminate    _____________________________________________________  STUDIES REVIEWED:  No Studies to review      PROCEDURES PERFORMED:  Procedures  No Procedures performed today

## 2019-12-06 NOTE — PROGRESS NOTES
ASSESSMENT/PLAN:    Assessment:   Left hand mass    Plan:   Proceed with scheduled surgery 1/14/20  The patient was advised to go for his hemoglobin A1c 2 weeks prior to surgery  Follow Up: After Surgery    To Do Next Visit:  Sutures out    Operative Discussions:     Standard Consent: The risks and benefits of the procedure were explained to the patient, which include, but are not limited to: Bleeding, infection, recurrence, pain, scar, damage to tendons, damage to nerves, and damage to blood vessels, failure to give desired results and complications related to anesthesia  These risks, along with alternative conservative treatment options, and postoperative protocols were voiced back and understood by the patient  All questions were answered to the patient's satisfaction  The patient agrees to comply with a standard postoperative protocol, and is willing to proceed  Education was provided via written and auditory forms  There were no barriers to learning  Written handouts regarding wound care, incision and scar care, and general preoperative information was provided to the patient  Prior to surgery, the patient may be requested to stop all anti-inflammatory medications  Prophylactic aspirin, Plavix, and Coumadin may be allowed to be continued  Medications including vitamin E , ginkgo, and fish oil are requested to be stopped approximately one week prior to surgery  Hypertensive medications and beta blockers, if taken, should be continued  _____________________________________________________  CHIEF COMPLAINT:  Chief Complaint   Patient presents with    Left Hand - Follow-up         SUBJECTIVE:  Stone Cota is a 38y o  year old male who presents for follow up regarding left hand mass  Patient was scheduled for surgical excision 10/03/19 but was cancelled due to uncontrol diabetes  Sugery was then rescheduled for 1/14/20    Since his last visit he developed increases swelling and erythema at which point he presented to the ED  I&D was performed while in the ED and he was placed on Augmentin for 1 week  Since that time the patient notes a marked improvement in his symptoms  Today denies any pain and states he has full ROM  Patient states it has drained a couple of times since the I&D  PAST MEDICAL HISTORY:  Past Medical History:   Diagnosis Date    Diabetes mellitus ([address] )        PAST SURGICAL HISTORY:  No past surgical history on file  FAMILY HISTORY:  Family History   Problem Relation Age of Onset    Hypertension Mother     Diabetes Father        SOCIAL HISTORY:  Social History     Tobacco Use    Smoking status: Current Every Day Smoker     Packs/day: 0 50     Types: Cigarettes    Smokeless tobacco: Never Used   Substance Use Topics    Alcohol use: Not Currently     Comment: Social    Drug use: No       MEDICATIONS:    Current Outpatient Medications:     atorvastatin (LIPITOR) 20 mg tablet, Take 1 tablet (20 mg total) by mouth daily, Disp: 30 tablet, Rfl: 5    Empagliflozin (JARDIANCE) 25 MG TABS, Take 1 tablet (25 mg total) by mouth every morning, Disp: 30 tablet, Rfl: 5    escitalopram (LEXAPRO) 10 mg tablet, Take 1 tablet (10 mg total) by mouth daily, Disp: 90 tablet, Rfl: 1    lisinopril (ZESTRIL) 10 mg tablet, Take 1 tablet (10 mg total) by mouth daily, Disp: 30 tablet, Rfl: 5    sildenafil (VIAGRA) 50 MG tablet, Take 1 tablet (50 mg total) by mouth daily as needed for erectile dysfunction, Disp: 10 tablet, Rfl: 0    metFORMIN (GLUCOPHAGE) 1000 MG tablet, Take 1 tablet (1,000 mg total) by mouth 2 (two) times a day with meals for 90 days, Disp: 180 tablet, Rfl: 1    sitaGLIPtin (JANUVIA) 100 mg tablet, Take 1 tablet (100 mg total) by mouth daily for 90 days, Disp: 90 tablet, Rfl: 1    ALLERGIES:  No Known Allergies    REVIEW OF SYSTEMS:  Pertinent items are noted in HPI  A comprehensive review of systems was negative      LABS:  HgA1c:   Lab Results   Component Value Date HGBA1C 9 0 (H) 11/14/2019     BMP:   Lab Results   Component Value Date    CALCIUM 9 3 11/14/2019    K 3 8 11/14/2019    CO2 24 11/14/2019     11/14/2019    BUN 17 11/14/2019    CREATININE 0 79 11/14/2019           _____________________________________________________    PHYSICAL EXAMINATION:  /88   Pulse 90   Ht 5' 9" (1 753 m)   Wt 83 9 kg (185 lb)   BMI 27 32 kg/m²   General: well developed and well nourished, alert, oriented times 3 and appears comfortable  Psychiatric: Normal  HEENT: Trachea Midline, No torticollis  Cardiovascular: No discernable arrhythmia  Pulmonary: No wheezing or stridor  Skin: No Erythema, No Lacerations, No Fluctuation, No Ulcerations  Neurovascular: Sensation Intact to the Median, Ulnar, Radial Nerve, Motor Intact to the Median, Ulnar, Radial Nerve and Pulses Intact    MUSCULOSKELETAL EXAMINATION:  LEFT SIDE:  Hand:  The mass is located to the webspace between his thumb and index finger on the palmar aspect of his hand that is 6 x 2 cm in size and does not transilluminate    _____________________________________________________  STUDIES REVIEWED:  No Studies to review      PROCEDURES PERFORMED:  Procedures  No Procedures performed today      Scribe Attestation    I,:   Joe Ash am acting as a scribe while in the presence of the attending physician :        I,:   Laurel Roberto MD personally performed the services described in this documentation    as scribed in my presence :

## 2019-12-17 ENCOUNTER — HOSPITAL ENCOUNTER (EMERGENCY)
Facility: HOSPITAL | Age: 44
Discharge: HOME | End: 2019-12-17
Attending: EMERGENCY MEDICINE
Payer: COMMERCIAL

## 2019-12-17 VITALS
BODY MASS INDEX: 26.58 KG/M2 | DIASTOLIC BLOOD PRESSURE: 93 MMHG | HEART RATE: 85 BPM | OXYGEN SATURATION: 98 % | TEMPERATURE: 98.8 F | RESPIRATION RATE: 18 BRPM | SYSTOLIC BLOOD PRESSURE: 147 MMHG | WEIGHT: 180 LBS

## 2019-12-17 DIAGNOSIS — L02.91 ABSCESS: Primary | ICD-10-CM

## 2019-12-17 DIAGNOSIS — L03.90 CELLULITIS: ICD-10-CM

## 2019-12-17 PROCEDURE — 99282 EMERGENCY DEPT VISIT SF MDM: CPT

## 2019-12-17 PROCEDURE — 10061 I&D ABSCESS COMP/MULTIPLE: CPT | Performed by: EMERGENCY MEDICINE

## 2019-12-17 PROCEDURE — 99283 EMERGENCY DEPT VISIT LOW MDM: CPT | Performed by: EMERGENCY MEDICINE

## 2019-12-17 RX ORDER — LIDOCAINE HYDROCHLORIDE AND EPINEPHRINE 10; 10 MG/ML; UG/ML
5 INJECTION, SOLUTION INFILTRATION; PERINEURAL ONCE
Status: COMPLETED | OUTPATIENT
Start: 2019-12-17 | End: 2019-12-17

## 2019-12-17 RX ORDER — SULFAMETHOXAZOLE AND TRIMETHOPRIM 800; 160 MG/1; MG/1
1 TABLET ORAL EVERY 12 HOURS SCHEDULED
Qty: 14 TABLET | Refills: 0 | Status: SHIPPED | OUTPATIENT
Start: 2019-12-17 | End: 2019-12-24

## 2019-12-17 RX ORDER — CEPHALEXIN 250 MG/1
500 CAPSULE ORAL EVERY 6 HOURS SCHEDULED
Qty: 80 CAPSULE | Refills: 0 | Status: SHIPPED | OUTPATIENT
Start: 2019-12-17 | End: 2019-12-27

## 2019-12-17 RX ADMIN — LIDOCAINE HYDROCHLORIDE AND EPINEPHRINE 5 ML: 10; 10 INJECTION, SOLUTION INFILTRATION; PERINEURAL at 09:46

## 2019-12-17 NOTE — DISCHARGE INSTRUCTIONS
Please have your hand evaluated in the next 48 hours  Return immediately for any increasing pain, redness, change in sensation, swelling, or red streaking

## 2019-12-17 NOTE — ED PROVIDER NOTES
History  Chief Complaint   Patient presents with    Follow-Up Abscess     chronic L hand (palm) abscess  procedure for drainage 01/14/20 scheduled  here for pain control needs     Patient presents for evaluation of left hand abscess  He has had an abscess on his left hand for approximately the last year with multiple encounters for drainage  He currently has an appointment with orthopedic surgery to have the area surgically explored and debrided on January 14th  He presents today due to increased swelling and erythema to the site  He says over the last couple days that he has felt increased pain, pressure on the abscess site as well as now a erythema on the dorsal aspect of his hand which was not there before  He denies any fever, chills, red streaking, pain out of portion, change in sensation to the area  Patient does have a history of diabetes and smoking which is cause a delay in his definitive treatment  States that he received a course of antibiotics for cellulitis approximately a month ago, the last time that he had had drained  Prior to Admission Medications   Prescriptions Last Dose Informant Patient Reported? Taking?    Empagliflozin (JARDIANCE) 25 MG TABS  Self No No   Sig: Take 1 tablet (25 mg total) by mouth every morning   atorvastatin (LIPITOR) 20 mg tablet  Self No No   Sig: Take 1 tablet (20 mg total) by mouth daily   escitalopram (LEXAPRO) 10 mg tablet  Self No No   Sig: Take 1 tablet (10 mg total) by mouth daily   lisinopril (ZESTRIL) 10 mg tablet  Self No No   Sig: Take 1 tablet (10 mg total) by mouth daily   metFORMIN (GLUCOPHAGE) 1000 MG tablet   No No   Sig: Take 1 tablet (1,000 mg total) by mouth 2 (two) times a day with meals for 90 days   sildenafil (VIAGRA) 50 MG tablet  Self No No   Sig: Take 1 tablet (50 mg total) by mouth daily as needed for erectile dysfunction   sitaGLIPtin (JANUVIA) 100 mg tablet   No No   Sig: Take 1 tablet (100 mg total) by mouth daily for 90 days Facility-Administered Medications: None       Past Medical History:   Diagnosis Date    Diabetes mellitus ([facility] [address] )        History reviewed  No pertinent surgical history  Family History   Problem Relation Age of Onset    Hypertension Mother     Diabetes Father      I have reviewed and agree with the history as documented  Social History     Tobacco Use    Smoking status: Current Every Day Smoker     Packs/day: 0 50     Types: Cigarettes    Smokeless tobacco: Never Used   Substance Use Topics    Alcohol use: Not Currently     Comment: Social    Drug use: No        Review of Systems   Constitutional: Negative for activity change, chills, fatigue and fever  Respiratory: Negative for cough and shortness of breath  Cardiovascular: Negative for chest pain and palpitations  Gastrointestinal: Negative for abdominal distention, abdominal pain, constipation, diarrhea, nausea and vomiting  Genitourinary: Negative for dysuria and hematuria  Musculoskeletal: Negative for arthralgias, myalgias and neck pain  Skin: Positive for color change and wound  Neurological: Negative for dizziness, syncope, light-headedness and headaches  All other systems reviewed and are negative  Physical Exam  ED Triage Vitals [12/17/19 0913]   Temperature Pulse Respirations Blood Pressure SpO2   98 8 °F (37 1 °C) 85 18 147/93 98 %      Temp Source Heart Rate Source Patient Position - Orthostatic VS BP Location FiO2 (%)   Oral Monitor -- -- --      Pain Score       8             Orthostatic Vital Signs  Vitals:    12/17/19 0913   BP: 147/93   Pulse: 85       Physical Exam   Constitutional: He is oriented to person, place, and time  He appears well-developed and well-nourished  No distress  HENT:   Head: Normocephalic and atraumatic  Right Ear: External ear normal    Left Ear: External ear normal    Eyes: Pupils are equal, round, and reactive to light  Conjunctivae and EOM are normal  Right eye exhibits no discharge  Left eye exhibits no discharge  Neck: Normal range of motion  Neck supple  No JVD present  No tracheal deviation present  Cardiovascular: Normal rate, regular rhythm, normal heart sounds and intact distal pulses  Exam reveals no gallop and no friction rub  No murmur heard  Pulmonary/Chest: Effort normal and breath sounds normal  No respiratory distress  He has no wheezes  He has no rales  Abdominal: Soft  Bowel sounds are normal  He exhibits no distension and no mass  There is no tenderness  There is no guarding  Musculoskeletal: Normal range of motion  He exhibits no edema, tenderness or deformity  Hands:  Neurological: He is alert and oriented to person, place, and time  No cranial nerve deficit or sensory deficit  He exhibits normal muscle tone  Coordination normal    Skin: He is not diaphoretic  Psychiatric: He has a normal mood and affect  His behavior is normal  Judgment and thought content normal        ED Medications  Medications   lidocaine-epinephrine (XYLOCAINE/EPINEPHRINE) 1 %-1:100,000 injection 5 mL (5 mL Infiltration Given by Other 12/17/19 0946)       Diagnostic Studies  Results Reviewed     None                 No orders to display         Procedures  Incision and drain  Date/Time: 12/17/2019 7:38 AM  Performed by: Tania Wood MD  Authorized by: Tania Wood MD     Patient location:  ED  Consent:     Consent obtained:  Verbal    Consent given by:  Patient    Risks discussed:  Bleeding, incomplete drainage, infection and pain    Alternatives discussed:  No treatment, delayed treatment and observation  Universal protocol:     Patient identity confirmed:  Verbally with patient  Location:     Type:  Abscess    Location:  Upper extremity    Upper extremity location:  L hand  Pre-procedure details:     Skin preparation:  Chloraprep  Anesthesia (see MAR for exact dosages):      Anesthesia method:  Local infiltration    Local anesthetic:  Lidocaine 1% WITH epi  Procedure details:     Complexity:  Simple    Needle aspiration: no      Incision types:  Stab incision    Scalpel blade:  11    Approach:  Open    Incision depth:  Subcutaneous    Wound management:  Probed and deloculated    Drainage:  Purulent and bloody    Drainage amount: Moderate    Wound treatment:  Wound left open    Packing materials:  None  Post-procedure details:     Patient tolerance of procedure: Tolerated well, no immediate complications          ED Course                               MDM  Number of Diagnoses or Management Options  Abscess:   Cellulitis:   Diagnosis management comments: Abscess was drained with a moderate amount of purulent fluid discharge  Patient stated that his pain had improved afterwards  Given the area of cellulitis around the abscess he was provided with a prescription for Keflex and Bactrim  Strict return precautions were discussed including fever, chills, spreading erythema, increased pain  Instructed to follow up with a provider in 2 days for re-evaluation of his wound  Disposition  Final diagnoses:   Abscess   Cellulitis     Time reflects when diagnosis was documented in both MDM as applicable and the Disposition within this note     Time User Action Codes Description Comment    12/17/2019 10:58 AM Marquette Cancel Add [L02 91] Abscess     12/17/2019 10:58 AM Andie Cancel Add [L03 90] Cellulitis       ED Disposition     ED Disposition Condition Date/Time Comment    Discharge Stable Tue Dec 17, 2019 10:58 AM Sapphire Suarez discharge to home/self care              Follow-up Information     Follow up With Specialties Details Why Contact Info Additional Information    Constanza Mattson, [address] [id number]       [address] [place] [address] Emergency Department Emergency Medicine   [address] [id number]  [phone]  ED, [address] [place] [address] 30 [facility] Orthopedic Surgery   [address] [id number]  [address] [facility], [address], [address]  [facility]          Discharge Medication List as of 12/17/2019 11:03 AM      START taking these medications    Details   cephalexin (KEFLEX) 250 mg capsule Take 2 capsules (500 mg total) by mouth every 6 (six) hours for 10 days, Starting Tue 12/17/2019, Until Fri 12/27/2019, Print      sulfamethoxazole-trimethoprim (BACTRIM DS) 800-160 mg per tablet Take 1 tablet by mouth every 12 (twelve) hours for 7 days smx-tmp DS (BACTRIM) 800-160 mg tabs (1tab q12 D10), Starting Tue 12/17/2019, Until Tue 12/24/2019, Print         CONTINUE these medications which have NOT CHANGED    Details   atorvastatin (LIPITOR) 20 mg tablet Take 1 tablet (20 mg total) by mouth daily, Starting Mon 11/18/2019, Normal      Empagliflozin (JARDIANCE) 25 MG TABS Take 1 tablet (25 mg total) by mouth every morning, Starting Mon 11/18/2019, Normal      escitalopram (LEXAPRO) 10 mg tablet Take 1 tablet (10 mg total) by mouth daily, Starting Wed 7/17/2019, Normal      lisinopril (ZESTRIL) 10 mg tablet Take 1 tablet (10 mg total) by mouth daily, Starting Mon 11/18/2019, Normal      metFORMIN (GLUCOPHAGE) 1000 MG tablet Take 1 tablet (1,000 mg total) by mouth 2 (two) times a day with meals for 90 days, Starting Wed 7/17/2019, Until Tue 11/12/2019, Normal      sildenafil (VIAGRA) 50 MG tablet Take 1 tablet (50 mg total) by mouth daily as needed for erectile dysfunction, Starting Tue 10/8/2019, Normal      sitaGLIPtin (JANUVIA) 100 mg tablet Take 1 tablet (100 mg total) by mouth daily for 90 days, Starting Wed 7/17/2019, Until Tue 11/12/2019, Normal           No discharge procedures on file  ED Provider  Attending physically available and evaluated Adrián Ochoa   I managed the patient along with the ED Attending      Electronically Signed by         Redd Hua MD  12/18/19 1822

## 2019-12-24 NOTE — ED ATTENDING ATTESTATION
12/17/2019  I, Dallas Vigil MD, saw and evaluated the patient  I have discussed the patient with the resident/non-physician practitioner and agree with the resident's/non-physician practitioner's findings, Plan of Care, and MDM as documented in the resident's/non-physician practitioner's note, except where noted  All available labs and Radiology studies were reviewed  I was present for key portions of any procedure(s) performed by the resident/non-physician practitioner and I was immediately available to provide assistance  At this point I agree with the current assessment done in the Emergency Department  I have conducted an independent evaluation of this patient a history and physical is as follows:  Patient reports greater than a year history of a swollen tender area on his left hand in the thenar eminence  Has been seen by ortho but did not follow through with plans for surgery  Today the patient complains of mildly increased swelling and pain in the area  Patient also admits to mild erythema  There has been no fever, chills, or other signs of systemic illness  Physical exam demonstrates a well appearing male in no acute distress  HEENT exam was normal  Lungs were clear  Heart had a regular rate and rhythm  The left hand had a tender swollen area in his thenar eminence without fluctuance or induration  There was mild erythema without warmth  There were no lymphangitic streaks  The remainder of the hand exam was normal  Patient had full range of motion to all digits and all digits were neurovascularly intact  All other extremities were normal  The patient was given signs to watch for regarding infection spreading and advised to have his hand rechecked in 2 days regardless of any improvement he might see, either by ortho or in the ED  Patient also told to return immediately if he develops increased symptoms    ED Course         Critical Care Time  Procedures

## 2020-01-06 ENCOUNTER — TELEPHONE (OUTPATIENT)
Dept: OBGYN CLINIC | Facility: CLINIC | Age: 45
End: 2020-01-06

## 2020-01-08 NOTE — PRE-PROCEDURE INSTRUCTIONS
Pre-Surgery Instructions:   Medication Instructions    atorvastatin (LIPITOR) 20 mg tablet Instructed patient per Anesthesia Guidelines   Empagliflozin (JARDIANCE) 25 MG TABS Instructed patient per Anesthesia Guidelines   escitalopram (LEXAPRO) 10 mg tablet Instructed patient per Anesthesia Guidelines   lisinopril (ZESTRIL) 10 mg tablet Instructed patient per Anesthesia Guidelines   metFORMIN (GLUCOPHAGE) 1000 MG tablet Instructed patient per Anesthesia Guidelines   sildenafil (VIAGRA) 50 MG tablet Instructed patient per Anesthesia Guidelines   sitaGLIPtin (JANUVIA) 100 mg tablet Instructed patient per Anesthesia Guidelines  No meds am surg  INSTR  ON ASC LOC  ,BRING PHOTO ID/MED LIST/INS  INFO , SHOWER REV , NO ASA/NSAIDS/VIT 1 WEEK PREOP  Med Instructions Troubleshoot   ACE/ARB Med Class     Continue this medication up to the evening before surgery/procedure, but do not take the morning of the day of surgery  Antidepressant Med Class     Continue to take this medication on your normal schedule  If this is an oral medication and you take it in the morning, then you may take this medicine with a sip of water  Insulin Med Class     Pre-Surgery/Procedure Instructions for Adult Patients who Take Medicine for Diabetes or to Control their Blood Sugar     Day Before Surgery/Procedure  Use the directions based on the type of medicine you take for your diabetes  1  If you are having a procedure that does not require a bowel prep:  ? Pre-Mixed Insulin (Intermediate Acting: Humalog 75/25, Humulin 70/30  Novolog 70/30, Regular Insulin)  § Take ½ your regular dose the evening before your procedure  ? Rapid/Fast Acting Insulin/Long Acting Insulin (Humalog U200, NovoLog, Apidra, Lantus, Levemir, [employer], [place])  § Take your FULL regular dose the day before procedure    ? Oral Diabetic Medicines including Glipizide/Glimepiride/Glucotrol (sulfonylurea)  § Take your regular dose with dinner the evening before your procedure  2  If you are having a procedure (e g  Colonoscopy) that requires a bowel prep and you are allowed to have at least a clear liquid diet:  ? Pre-Mixed Insulin (Intermediate Acting: Humalog 75/25, Humulin 70/30, Novolog 70/30, Regular Insulin)  § Take ½ your regular dose the evening before your procedure  ? Rapid/Fast Acting Insulin (Humalog U200, NovoLog, Apidra, Fiasp)  § Take ½ your regular dose the evening before your procedure  ? Long Acting Insulin (Lantus, Levemir, Michaeline Christianson)  § Take your FULL regular dose the day before procedure  ? Oral Glipizide/Glimepiride/Glucotrol (sulfonylurea)  § Take ½ your regular dose the evening before your procedure  ? Oral Diabetic Medicines that are NOT Glipizide/Glimepiride/Glucotrol  § Take your regular dose with dinner in the evening before your procedure      Day of Surgery/Procedure  · Long Acting Insulin (Lantus, Levemir, Michaeline Christianson)  ? If you usually take your Long-Acting Insulin in the morning, take the full dose as scheduled  · With the exception of the morning Long-Acting Insulin noted above, DO NOT take ANY diabetic medicine on the day of your procedure unless you were instructed by the doctor who manages your diabetic medicines  · Continue to check your blood sugars  · If you have an insulin pump then consult with your Endocrinologist for instructions  · If you cannot see your Endocrinologist, on the day of the procedure set your insulin pump to your basal rate only  Please bring your insulin pump supplies to the hospital      This Educational material has been approved by the Patient Education Advisory Committee  Date prepared: 1/17/2018          Expiration date: 1/17/2019        Approval Number:                     Statin Med Class     Continue to take this medication on your normal schedule    If this is an oral medication and you take it in the morning, then you may take this medicine with a sip of water

## 2020-01-14 ENCOUNTER — ANESTHESIA EVENT (OUTPATIENT)
Dept: PERIOP | Facility: HOSPITAL | Age: 45
End: 2020-01-14
Payer: COMMERCIAL

## 2020-01-14 ENCOUNTER — ANESTHESIA (OUTPATIENT)
Dept: PERIOP | Facility: HOSPITAL | Age: 45
End: 2020-01-14
Payer: COMMERCIAL

## 2020-01-14 ENCOUNTER — HOSPITAL ENCOUNTER (OUTPATIENT)
Facility: HOSPITAL | Age: 45
Setting detail: OUTPATIENT SURGERY
Discharge: HOME | End: 2020-01-14
Attending: ORTHOPAEDIC SURGERY | Admitting: ORTHOPAEDIC SURGERY
Payer: COMMERCIAL

## 2020-01-14 VITALS
HEART RATE: 90 BPM | TEMPERATURE: 98.4 F | RESPIRATION RATE: 20 BRPM | WEIGHT: 179 LBS | SYSTOLIC BLOOD PRESSURE: 115 MMHG | BODY MASS INDEX: 26.51 KG/M2 | OXYGEN SATURATION: 97 % | DIASTOLIC BLOOD PRESSURE: 67 MMHG | HEIGHT: 69 IN

## 2020-01-14 DIAGNOSIS — R22.32 MASS OF LEFT HAND: Primary | ICD-10-CM

## 2020-01-14 LAB
GLUCOSE SERPL-MCNC: 129 MG/DL (ref 65–140)
GLUCOSE SERPL-MCNC: 136 MG/DL (ref 65–140)

## 2020-01-14 PROCEDURE — 11423 EXC H-F-NK-SP B9+MARG 2.1-3: CPT | Performed by: ORTHOPAEDIC SURGERY

## 2020-01-14 PROCEDURE — 99024 POSTOP FOLLOW-UP VISIT: CPT | Performed by: ORTHOPAEDIC SURGERY

## 2020-01-14 PROCEDURE — 88304 TISSUE EXAM BY PATHOLOGIST: CPT | Performed by: PATHOLOGY

## 2020-01-14 PROCEDURE — 82948 REAGENT STRIP/BLOOD GLUCOSE: CPT

## 2020-01-14 RX ORDER — FENTANYL CITRATE/PF 50 MCG/ML
50 SYRINGE (ML) INJECTION
Status: DISCONTINUED | OUTPATIENT
Start: 2020-01-14 | End: 2020-01-14 | Stop reason: HOSPADM

## 2020-01-14 RX ORDER — ONDANSETRON 2 MG/ML
INJECTION INTRAMUSCULAR; INTRAVENOUS AS NEEDED
Status: DISCONTINUED | OUTPATIENT
Start: 2020-01-14 | End: 2020-01-14 | Stop reason: SURG

## 2020-01-14 RX ORDER — SODIUM CHLORIDE, SODIUM LACTATE, POTASSIUM CHLORIDE, CALCIUM CHLORIDE 600; 310; 30; 20 MG/100ML; MG/100ML; MG/100ML; MG/100ML
20 INJECTION, SOLUTION INTRAVENOUS CONTINUOUS
Status: DISCONTINUED | OUTPATIENT
Start: 2020-01-14 | End: 2020-01-14 | Stop reason: HOSPADM

## 2020-01-14 RX ORDER — ACETAMINOPHEN 325 MG/1
650 TABLET ORAL ONCE
Status: DISCONTINUED | OUTPATIENT
Start: 2020-01-14 | End: 2020-01-14 | Stop reason: HOSPADM

## 2020-01-14 RX ORDER — NAPROXEN SODIUM 220 MG
220 TABLET ORAL 2 TIMES DAILY WITH MEALS
Qty: 10 TABLET | Refills: 0 | Status: SHIPPED | OUTPATIENT
Start: 2020-01-14 | End: 2020-05-27 | Stop reason: ALTCHOICE

## 2020-01-14 RX ORDER — DEXAMETHASONE SODIUM PHOSPHATE 10 MG/ML
INJECTION, SOLUTION INTRAMUSCULAR; INTRAVENOUS AS NEEDED
Status: DISCONTINUED | OUTPATIENT
Start: 2020-01-14 | End: 2020-01-14 | Stop reason: SURG

## 2020-01-14 RX ORDER — CEFAZOLIN SODIUM 2 G/50ML
2000 SOLUTION INTRAVENOUS ONCE
Status: COMPLETED | OUTPATIENT
Start: 2020-01-14 | End: 2020-01-14

## 2020-01-14 RX ORDER — LIDOCAINE HYDROCHLORIDE 10 MG/ML
0.5 INJECTION, SOLUTION EPIDURAL; INFILTRATION; INTRACAUDAL; PERINEURAL ONCE AS NEEDED
Status: COMPLETED | OUTPATIENT
Start: 2020-01-14 | End: 2020-01-14

## 2020-01-14 RX ORDER — ONDANSETRON 2 MG/ML
4 INJECTION INTRAMUSCULAR; INTRAVENOUS ONCE AS NEEDED
Status: DISCONTINUED | OUTPATIENT
Start: 2020-01-14 | End: 2020-01-14 | Stop reason: HOSPADM

## 2020-01-14 RX ORDER — MAGNESIUM HYDROXIDE 1200 MG/15ML
LIQUID ORAL AS NEEDED
Status: DISCONTINUED | OUTPATIENT
Start: 2020-01-14 | End: 2020-01-14 | Stop reason: HOSPADM

## 2020-01-14 RX ORDER — FENTANYL CITRATE 50 UG/ML
INJECTION, SOLUTION INTRAMUSCULAR; INTRAVENOUS AS NEEDED
Status: DISCONTINUED | OUTPATIENT
Start: 2020-01-14 | End: 2020-01-14 | Stop reason: SURG

## 2020-01-14 RX ORDER — MIDAZOLAM HYDROCHLORIDE 2 MG/2ML
INJECTION, SOLUTION INTRAMUSCULAR; INTRAVENOUS AS NEEDED
Status: DISCONTINUED | OUTPATIENT
Start: 2020-01-14 | End: 2020-01-14 | Stop reason: SURG

## 2020-01-14 RX ORDER — SODIUM CHLORIDE, SODIUM LACTATE, POTASSIUM CHLORIDE, CALCIUM CHLORIDE 600; 310; 30; 20 MG/100ML; MG/100ML; MG/100ML; MG/100ML
125 INJECTION, SOLUTION INTRAVENOUS CONTINUOUS
Status: DISCONTINUED | OUTPATIENT
Start: 2020-01-14 | End: 2020-01-14 | Stop reason: HOSPADM

## 2020-01-14 RX ORDER — HYDROCODONE BITARTRATE AND ACETAMINOPHEN 5; 325 MG/1; MG/1
1 TABLET ORAL EVERY 6 HOURS PRN
Qty: 5 TABLET | Refills: 0 | Status: SHIPPED | OUTPATIENT
Start: 2020-01-14 | End: 2020-01-19

## 2020-01-14 RX ORDER — SENNOSIDES 8.6 MG
650 CAPSULE ORAL EVERY 8 HOURS
Qty: 15 TABLET | Refills: 0 | Status: SHIPPED | OUTPATIENT
Start: 2020-01-14 | End: 2020-05-27 | Stop reason: ALTCHOICE

## 2020-01-14 RX ORDER — HYDROCODONE BITARTRATE AND ACETAMINOPHEN 5; 325 MG/1; MG/1
1 TABLET ORAL EVERY 6 HOURS PRN
Status: DISCONTINUED | OUTPATIENT
Start: 2020-01-14 | End: 2020-01-14 | Stop reason: HOSPADM

## 2020-01-14 RX ORDER — LIDOCAINE HYDROCHLORIDE AND EPINEPHRINE 10; 10 MG/ML; UG/ML
INJECTION, SOLUTION INFILTRATION; PERINEURAL AS NEEDED
Status: DISCONTINUED | OUTPATIENT
Start: 2020-01-14 | End: 2020-01-14 | Stop reason: HOSPADM

## 2020-01-14 RX ADMIN — FENTANYL CITRATE 50 MCG: 50 INJECTION, SOLUTION INTRAMUSCULAR; INTRAVENOUS at 11:53

## 2020-01-14 RX ADMIN — LIDOCAINE HYDROCHLORIDE 0.5 ML: 10 INJECTION, SOLUTION EPIDURAL; INFILTRATION; INTRACAUDAL; PERINEURAL at 10:25

## 2020-01-14 RX ADMIN — CEFAZOLIN SODIUM 2000 MG: 2 SOLUTION INTRAVENOUS at 11:21

## 2020-01-14 RX ADMIN — PHENYLEPHRINE HYDROCHLORIDE 100 MCG: 10 INJECTION INTRAVENOUS at 11:56

## 2020-01-14 RX ADMIN — ONDANSETRON 4 MG: 2 INJECTION INTRAMUSCULAR; INTRAVENOUS at 11:56

## 2020-01-14 RX ADMIN — FENTANYL CITRATE 50 MCG: 50 INJECTION, SOLUTION INTRAMUSCULAR; INTRAVENOUS at 11:47

## 2020-01-14 RX ADMIN — PHENYLEPHRINE HYDROCHLORIDE 100 MCG: 10 INJECTION INTRAVENOUS at 11:36

## 2020-01-14 RX ADMIN — PHENYLEPHRINE HYDROCHLORIDE 200 MCG: 10 INJECTION INTRAVENOUS at 12:00

## 2020-01-14 RX ADMIN — PHENYLEPHRINE HYDROCHLORIDE 100 MCG: 10 INJECTION INTRAVENOUS at 11:47

## 2020-01-14 RX ADMIN — MIDAZOLAM 2 MG: 1 INJECTION INTRAMUSCULAR; INTRAVENOUS at 11:26

## 2020-01-14 RX ADMIN — DEXAMETHASONE SODIUM PHOSPHATE 5 MG: 10 INJECTION, SOLUTION INTRAMUSCULAR; INTRAVENOUS at 11:55

## 2020-01-14 RX ADMIN — SODIUM CHLORIDE, SODIUM LACTATE, POTASSIUM CHLORIDE, AND CALCIUM CHLORIDE 125 ML/HR: .6; .31; .03; .02 INJECTION, SOLUTION INTRAVENOUS at 10:25

## 2020-01-14 NOTE — ANESTHESIA POSTPROCEDURE EVALUATION
Post-Op Assessment Note    CV Status:  Stable  Pain Score: 1    Pain management: adequate     Mental Status:  Alert and awake   Hydration Status:  Euvolemic   PONV Controlled:  Controlled   Airway Patency:  Patent   Post Op Vitals Reviewed: Yes      Staff: Anesthesiologist           /73 (01/14/20 1301)    Temp 97 8 °F (36 6 °C) (01/14/20 1301)    Pulse     Resp      SpO2

## 2020-01-14 NOTE — ANESTHESIA PREPROCEDURE EVALUATION
Review of Systems/Medical History          Cardiovascular  Hyperlipidemia, Hypertension ,    Pulmonary  Smoker cigarette smoker  ,        GI/Hepatic  Negative GI/hepatic ROS          Negative  ROS        Endo/Other  Diabetes type 2 Oral agent,      GYN  Negative gynecology ROS          Hematology  Negative hematology ROS      Musculoskeletal    Comment: Left hand mass      Neurology  Negative neurology ROS      Psychology   Depression ,              Physical Exam    Airway    Mallampati score: III  TM Distance: >3 FB  Neck ROM: full     Dental   No notable dental hx     Cardiovascular      Pulmonary      Other Findings        Anesthesia Plan  ASA Score- 3     Anesthesia Type- general with ASA Monitors  Additional Monitors:   Airway Plan: LMA  Plan Factors-    Induction- intravenous  Postoperative Plan- Plan for postoperative opioid use  Informed Consent- Anesthetic plan and risks discussed with patient  I personally reviewed this patient with the CRNA  Discussed and agreed on the Anesthesia Plan with the CRNA  Baljeet Obrien

## 2020-01-14 NOTE — H&P
H&P Exam - Orthopedics   Betty Watson 38 y o  male MRN: [id number]  Unit/Bed#: [facility] 03    Assessment/Plan   Assessment:  Left palmar hand mass  Plan:  Left hand mass excision    History of Present Illness   HPI:  Betty Watson is a 38 y o  male who presents with left hand mass for excision today  Caitie Bensonjr     Historical Information  Review Of Systems:   · Skin: Normal  · Neuro: See HPI  · Musculoskeletal: See HPI  · 14 point review of systems negative except as stated above     Past Medical History:   Past Medical History:   Diagnosis Date    Diabetes mellitus ([facility] [address] )        Past Surgical History:   Past Surgical History:   Procedure Laterality Date    HERNIA REPAIR      as baby       Family History:  Family history reviewed and non-contributory  Family History   Problem Relation Age of Onset    Hypertension Mother     Diabetes Father        Social History:  Social History     Socioeconomic History    Marital status: Single     Spouse name: None    Number of children: None    Years of education: None    Highest education level: None   Occupational History    None   Social Needs    Financial resource strain: None    Food insecurity:     Worry: None     Inability: None    Transportation needs:     Medical: None     Non-medical: None   Tobacco Use    Smoking status: Current Every Day Smoker     Packs/day: 0 50     Types: Cigarettes    Smokeless tobacco: Never Used   Substance and Sexual Activity    Alcohol use: Yes     Comment: Social    Drug use: No    Sexual activity: Yes     Partners: Female   Lifestyle    Physical activity:     Days per week: None     Minutes per session: None    Stress: None   Relationships    Social connections:     Talks on phone: None     Gets together: None     Attends [employer] service: None     Active member of club or organization: None     Attends meetings of clubs or organizations: None     Relationship status: None    Intimate partner violence:     Fear of current or ex partner: None     Emotionally abused: None     Physically abused: None     Forced sexual activity: None   Other Topics Concern    None   Social History Narrative    None       Allergies:   No Known Allergies        Labs:  0   Lab Value Date/Time    HCT 45 2 08/17/2019 0715    HCT 48 1 01/15/2019 0938    HCT 42 7 07/28/2018 0733    HGB 16 0 08/17/2019 0715    HGB 17 2 (H) 01/15/2019 0938    HGB 15 8 07/28/2018 0733    WBC 6 57 08/17/2019 0715    WBC 6 11 01/15/2019 0938    WBC 5 57 07/28/2018 0733       Meds:    Current Facility-Administered Medications:     ceFAZolin (ANCEF) IVPB (premix) 2,000 mg, 2,000 mg, Intravenous, Once, Shawna Norwood MD    lactated ringers infusion, 125 mL/hr, Intravenous, Continuous, Luis Enrique Ackerman MD, Last Rate: 125 mL/hr at 01/14/20 1025, 125 mL/hr at 01/14/20 1025    lidocaine (PF) (XYLOCAINE-MPF) 1 % injection 0 5 mL, 0 5 mL, Infiltration, Once PRN, Luis Enrique Ackerman MD    Blood Culture:   No results found for: BLOODCX    Wound Culture:   No results found for: WOUNDCULT    Ins and Outs:  No intake/output data recorded  Physical Exam  /78   Pulse 70   Temp 97 5 °F (36 4 °C) (Tympanic)   Resp 20   Ht 5' 9" (1 753 m)   Wt 81 2 kg (179 lb)   SpO2 97%   BMI 26 43 kg/m²   /78   Pulse 70   Temp 97 5 °F (36 4 °C) (Tympanic)   Resp 20   Ht 5' 9" (1 753 m)   Wt 81 2 kg (179 lb)   SpO2 97%   BMI 26 43 kg/m²   Gen: Alert and oriented to person, place, time  HEENT: EOMI, eyes clear, moist mucus membranes, hearing intact  Respiratory: Bilateral chest rise  No audible wheezing found  Cardiovascular: Regular Rate and Rhythm  Abdomen: soft nontender/nondistended  Ortho Exam:  Left volar hand mass localized between the thumb and index finger on the palmar side of his hand that a 6 x 2 cm in size  Neuro Exam:   The patient is neurovascularly intact in the median, ulnar, and radial nerve distribution    There is normal sensation and good capillary refill within the digits  2+ pulses              Lab Results: Reviewed  Imaging: Reviewed

## 2020-01-14 NOTE — OP NOTE
OPERATIVE REPORT  PATIENT NAME: Waqar Nassar  :  1975  MRN: [id number]  Pt Location: [facility]    SURGERY DATE: 20    Surgeon(s) and Role:     * Moshe Marie MD - Primary    Pre-Op Diagnosis:  Mass of left hand [R22 32]    Post-Op Diagnosis Codes:     * Mass of left hand [R22 32]    Procedure(s):  left hand mass excision (Left) - epidermal inclusion cyst measuring 3 cm x 2 cm x 1 cm left palm    Specimen(s):  Order Name Source Comment Collection Info Order Time   TISSUE EXAM Mass  Collected By: Moshe Marie MD 2020 11:56 AM       Estimated Blood Loss:   Minimal      Anesthesia Type:   General    Operative Indications: The patient has a history of a left hand mass that was recalcitrant to conservative management  The decision was made to bring the patient to the operating room for a left hand mass excision  Risks of the procedure were explained which include, but are not limited to bleeding; infection; damage to nerves, arteries,veins, tendons; scar; pain; need for reoperation; failure to give desired result; and risks of anaesthesia  All questions were answered to satisfaction and they were willing to proceed  Operative Findings:  Left hand mass consistent with an epidermal inclusion cyst measuring 3 times to x 1 cm  Complications:   None    Procedure and Technique:  After the patient, site, and procedure were identified, the patient was brought into the operating room in a supine position  General anaesthesia and local medication were provided  A tourniquet was not used  The  left upper extremity was then prepped and drapped in a normal, sterile, orthopedic fashion  After the patient, site, and procedure were identified attention was turned towards the left hand  A Juan shaped incision was then made following the creases of the hand centered over the hand mass  On this was involving the palmar aspect of the left hand    We dissected down through the skin elevating a full-thickness skin flap off the left hand  At this point, we identified a large epidermal inclusion cyst measuring 3 x 2 x 1 cm that was well encapsulated  This was circumferentially dissected and was removed in its entirety including the capsule and sent for routine pathologic examination  There was no invasion into the deeper subcutaneous tissues at this point  At this point we were satisfied with our excision  At the completion of the procedure, hemostasis was obtained with cautery and direct pressure  The wounds were copiously irrigated with sterile solution  The wounds were closed with Prolene  Sterile dressings were applied, including Xeroform, gauze, tweeners, webril, ACE  Please note, all sponge, needle, and instrument counts were correct prior to closure  Loupe magnification was utilized  The patient tolerated the procedure well       I was present for the entire procedure and A qualified resident physician was not available    Patient Disposition:  PACU  and hemodynamically stable    SIGNATURE: Arthur Lamar MD  DATE: 01/14/20  TIME: 12:19 PM

## 2020-01-24 ENCOUNTER — OFFICE VISIT (OUTPATIENT)
Dept: OBGYN CLINIC | Facility: HOSPITAL | Age: 45
End: 2020-01-24

## 2020-01-24 VITALS
HEIGHT: 69 IN | HEART RATE: 86 BPM | BODY MASS INDEX: 26.02 KG/M2 | SYSTOLIC BLOOD PRESSURE: 119 MMHG | WEIGHT: 175.71 LBS | DIASTOLIC BLOOD PRESSURE: 83 MMHG

## 2020-01-24 DIAGNOSIS — R22.32 MASS OF HAND, LEFT: Primary | ICD-10-CM

## 2020-01-24 PROCEDURE — 3008F BODY MASS INDEX DOCD: CPT | Performed by: FAMILY MEDICINE

## 2020-01-24 PROCEDURE — 99024 POSTOP FOLLOW-UP VISIT: CPT | Performed by: ORTHOPAEDIC SURGERY

## 2020-01-24 NOTE — PROGRESS NOTES
Assessment:   S/p left hand mass excision on 1/14/2020    Plan:   Patients sutures were removed today  He was advised to wash hand with soap and water and to let his hand dry out  He has no formal restrictions  Follow Up:  PRN    To Do Next Visit:         CHIEF COMPLAINT:  Chief Complaint   Patient presents with    Left Hand - Post-op         SUBJECTIVE:  Cristian Henderson is a 38 y o  male who presents for follow up after S/p left hand mass excision on 1/14/2020  Today patient has Pain  Moderate  Constant  Sharp and Aching         PHYSICAL EXAMINATION:  Vital signs: /83   Pulse 86   Ht 5' 9" (1 753 m)   Wt 79 7 kg (175 lb 11 3 oz)   BMI 25 95 kg/m²   General: well developed and well nourished, alert, oriented times 3 and appears comfortable  Psychiatric: Normal    MUSCULOSKELETAL EXAMINATION:  Incision: healed and maserated skin surrounding incision site no signs of infection  Range of Motion: opposition intact and full composite fist possible  Neurovascular status: Neuro intact, good cap refill  Activity Restrictions: No restrictions  Done today: Sutures out      STUDIES REVIEWED:  Labs reviewed on 1/14/2020 demonstrates an epidermal inclusion cyst       PROCEDURES PERFORMED:  Procedures  No Procedures performed today   Scribe Attestation    I,:   Margi Cruz am acting as a scribe while in the presence of the attending physician :        I,:   Aki Ruiz MD personally performed the services described in this documentation    as scribed in my presence :

## 2020-01-27 ENCOUNTER — TELEPHONE (OUTPATIENT)
Dept: OBGYN CLINIC | Facility: HOSPITAL | Age: 45
End: 2020-01-27

## 2020-03-05 ENCOUNTER — OFFICE VISIT (OUTPATIENT)
Dept: FAMILY MEDICINE CLINIC | Facility: CLINIC | Age: 45
End: 2020-03-05
Payer: COMMERCIAL

## 2020-03-05 VITALS
OXYGEN SATURATION: 97 % | WEIGHT: 182 LBS | SYSTOLIC BLOOD PRESSURE: 142 MMHG | BODY MASS INDEX: 26.88 KG/M2 | TEMPERATURE: 98.2 F | RESPIRATION RATE: 18 BRPM | DIASTOLIC BLOOD PRESSURE: 88 MMHG | HEART RATE: 130 BPM

## 2020-03-05 DIAGNOSIS — I10 ESSENTIAL HYPERTENSION: ICD-10-CM

## 2020-03-05 DIAGNOSIS — E78.2 MIXED HYPERLIPIDEMIA: ICD-10-CM

## 2020-03-05 DIAGNOSIS — R22.32 MASS OF HAND, LEFT: Primary | ICD-10-CM

## 2020-03-05 DIAGNOSIS — E11.9 TYPE 2 DIABETES MELLITUS WITHOUT COMPLICATION, WITHOUT LONG-TERM CURRENT USE OF INSULIN (HCC): Chronic | ICD-10-CM

## 2020-03-05 PROCEDURE — 3079F DIAST BP 80-89 MM HG: CPT | Performed by: FAMILY MEDICINE

## 2020-03-05 PROCEDURE — 99213 OFFICE O/P EST LOW 20 MIN: CPT | Performed by: FAMILY MEDICINE

## 2020-03-05 PROCEDURE — 3077F SYST BP >= 140 MM HG: CPT | Performed by: FAMILY MEDICINE

## 2020-03-05 RX ORDER — ATORVASTATIN CALCIUM 10 MG/1
10 TABLET, FILM COATED ORAL
COMMUNITY
Start: 2020-02-21 | End: 2020-08-06

## 2020-03-05 NOTE — PROGRESS NOTES
Assessment/Plan:    No problem-specific Assessment & Plan notes found for this encounter  Diagnoses and all orders for this visit:    Mass of hand, left  Comments:  Jayy Montano is stable on exam   His hand has healed well post-excisional biopsy (benign) with Ortho  Type 2 diabetes mellitus without complication, without long-term current use of insulin (HCC)  Comments:  FBW re-ordered for the pt today, with again an A1c incl  He is to continue a lower carb/salt diet, and get back to regular exercise  Orders:  -     Comprehensive metabolic panel; Future  -     HEMOGLOBIN A1C W/ EAG ESTIMATION; Future    Essential hypertension  Comments:  BP up slightly today - likely due to the drink that he had prior to OV today  No changes to meds at this time  Mixed hyperlipidemia  Comments: On Atorvastatin - FBW pending  Orders:  -     Lipid Panel with Direct LDL reflex; Future    Other orders  -     atorvastatin (LIPITOR) 10 mg tablet; Take 10 mg by mouth daily at bedtime  -     metFORMIN (GLUCOPHAGE) 1000 MG tablet; Take 1,000 mg by mouth 2 (two) times a day with meals          Subjective:      Patient ID: Kristy Mattson is a 38 y o  male  Jayy Montano was able to finally get the mass removed from his left hand with Ortho in 01/2020  Has healed  He is back to work  Is taking taking meds, including BP meds - states that he drank a "Monster" drink (heavy caffeine drink) just before his appt today  Walking some for exercise  Has been watching his diet better  Has not yet completed previously ordered FBW - last A1c for us was 9 0% in 11/2019          The following portions of the patient's history were reviewed and updated as appropriate: allergies, current medications, past family history, past social history, past surgical history and problem list     Past Medical History:   Diagnosis Date    Diabetes mellitus ([address] )      Past Surgical History:   Procedure Laterality Date    HERNIA REPAIR      as baby    MASS EXCISION Left 1/14/2020    Procedure: left hand mass excision;  Surgeon: Arthur Lamar MD;  Location: [facility];  Service: Orthopedics       Current Outpatient Medications:     acetaminophen (TYLENOL 8 HOUR) 650 mg CR tablet, Take 1 tablet (650 mg total) by mouth every 8 (eight) hours, Disp: 15 tablet, Rfl: 0    atorvastatin (LIPITOR) 10 mg tablet, Take 10 mg by mouth daily at bedtime, Disp: , Rfl:     atorvastatin (LIPITOR) 20 mg tablet, Take 1 tablet (20 mg total) by mouth daily, Disp: 30 tablet, Rfl: 5    Empagliflozin (JARDIANCE) 25 MG TABS, Take 1 tablet (25 mg total) by mouth every morning, Disp: 30 tablet, Rfl: 5    escitalopram (LEXAPRO) 10 mg tablet, Take 1 tablet (10 mg total) by mouth daily, Disp: 90 tablet, Rfl: 1    lisinopril (ZESTRIL) 10 mg tablet, Take 1 tablet (10 mg total) by mouth daily, Disp: 30 tablet, Rfl: 5    metFORMIN (GLUCOPHAGE) 1000 MG tablet, Take 1 tablet (1,000 mg total) by mouth 2 (two) times a day with meals for 90 days, Disp: 180 tablet, Rfl: 1    metFORMIN (GLUCOPHAGE) 1000 MG tablet, Take 1,000 mg by mouth 2 (two) times a day with meals, Disp: , Rfl:     naproxen sodium (ALEVE) 220 MG tablet, Take 1 tablet (220 mg total) by mouth 2 (two) times a day with meals for 5 days, Disp: 10 tablet, Rfl: 0    sildenafil (VIAGRA) 50 MG tablet, Take 1 tablet (50 mg total) by mouth daily as needed for erectile dysfunction, Disp: 10 tablet, Rfl: 0    sitaGLIPtin (JANUVIA) 100 mg tablet, Take 1 tablet (100 mg total) by mouth daily for 90 days, Disp: 90 tablet, Rfl: 1    No Known Allergies    Social History     Tobacco Use    Smoking status: Current Every Day Smoker     Packs/day: 0 50     Types: Cigarettes    Smokeless tobacco: Never Used   Substance Use Topics    Alcohol use: Yes     Comment: Social    Drug use: No         Review of Systems   Constitutional: Negative for activity change and fever  Eyes: Negative for visual disturbance     Respiratory: Negative for shortness of breath  Cardiovascular: Negative for chest pain  Musculoskeletal:        No hand pain  Neurological: Negative for headaches  Objective:      /88 (BP Location: Right arm, Patient Position: Sitting, Cuff Size: Standard)   Pulse (!) 130   Temp 98 2 °F (36 8 °C)   Resp 18   Wt 82 6 kg (182 lb)   SpO2 97%   BMI 26 88 kg/m²          Physical Exam   Constitutional: He is oriented to person, place, and time  He appears well-developed and well-nourished  No distress  HENT:   Head: Normocephalic and atraumatic  Eyes: Conjunctivae are normal    Neck: Normal range of motion  Neck supple  No JVD present  No thyromegaly present  Cardiovascular: Normal rate, regular rhythm and normal heart sounds  Exam reveals no gallop and no friction rub  No murmur heard  Pulmonary/Chest: Effort normal and breath sounds normal  No stridor  No respiratory distress  He has no wheezes  He has no rales  Musculoskeletal:        Left hand: He exhibits normal range of motion  Hands:  Lymphadenopathy:     He has no cervical adenopathy  Neurological: He is alert and oriented to person, place, and time  Skin: He is not diaphoretic  Psychiatric: He has a normal mood and affect  His behavior is normal  Judgment and thought content normal    Nursing note and vitals reviewed

## 2020-03-09 ENCOUNTER — TELEPHONE (OUTPATIENT)
Dept: OBGYN CLINIC | Facility: HOSPITAL | Age: 45
End: 2020-03-09

## 2020-03-09 NOTE — TELEPHONE ENCOUNTER
Patient is calling because he would like to  the note dated 1/27/20  I called [facility] & they will print it to have it ready when he gets there later

## 2020-05-18 ENCOUNTER — TELEMEDICINE (OUTPATIENT)
Dept: FAMILY MEDICINE CLINIC | Facility: CLINIC | Age: 45
End: 2020-05-18
Payer: COMMERCIAL

## 2020-05-18 DIAGNOSIS — Z20.828 SARS-ASSOCIATED CORONAVIRUS EXPOSURE: Primary | ICD-10-CM

## 2020-05-18 PROCEDURE — 99213 OFFICE O/P EST LOW 20 MIN: CPT | Performed by: FAMILY MEDICINE

## 2020-05-27 ENCOUNTER — TELEMEDICINE (OUTPATIENT)
Dept: FAMILY MEDICINE CLINIC | Facility: CLINIC | Age: 45
End: 2020-05-27

## 2020-05-27 ENCOUNTER — TELEPHONE (OUTPATIENT)
Dept: FAMILY MEDICINE CLINIC | Facility: CLINIC | Age: 45
End: 2020-05-27

## 2020-05-27 DIAGNOSIS — E11.9 TYPE 2 DIABETES MELLITUS WITHOUT COMPLICATION, WITHOUT LONG-TERM CURRENT USE OF INSULIN (HCC): Primary | ICD-10-CM

## 2020-06-08 ENCOUNTER — TELEPHONE (OUTPATIENT)
Dept: FAMILY MEDICINE CLINIC | Facility: CLINIC | Age: 45
End: 2020-06-08

## 2020-06-09 ENCOUNTER — TELEPHONE (OUTPATIENT)
Dept: FAMILY MEDICINE CLINIC | Facility: CLINIC | Age: 45
End: 2020-06-09

## 2020-06-11 DIAGNOSIS — F33.41 RECURRENT MAJOR DEPRESSIVE DISORDER, IN PARTIAL REMISSION (HCC): ICD-10-CM

## 2020-06-11 RX ORDER — ESCITALOPRAM OXALATE 10 MG/1
TABLET ORAL
Qty: 90 TABLET | Refills: 0 | Status: SHIPPED | OUTPATIENT
Start: 2020-06-11 | End: 2020-11-25

## 2020-06-17 ENCOUNTER — TELEMEDICINE (OUTPATIENT)
Dept: FAMILY MEDICINE CLINIC | Facility: CLINIC | Age: 45
End: 2020-06-17
Payer: COMMERCIAL

## 2020-06-17 VITALS — TEMPERATURE: 98.3 F | BODY MASS INDEX: 27.17 KG/M2 | WEIGHT: 184 LBS

## 2020-06-17 DIAGNOSIS — E78.2 MIXED HYPERLIPIDEMIA: ICD-10-CM

## 2020-06-17 DIAGNOSIS — E11.9 TYPE 2 DIABETES MELLITUS WITHOUT COMPLICATION, WITHOUT LONG-TERM CURRENT USE OF INSULIN (HCC): Primary | Chronic | ICD-10-CM

## 2020-06-17 DIAGNOSIS — G62.9 NEUROPATHY: ICD-10-CM

## 2020-06-17 PROCEDURE — 99213 OFFICE O/P EST LOW 20 MIN: CPT | Performed by: FAMILY MEDICINE

## 2020-07-27 ENCOUNTER — APPOINTMENT (OUTPATIENT)
Dept: LAB | Facility: HOSPITAL | Age: 45
End: 2020-07-27
Payer: COMMERCIAL

## 2020-07-27 DIAGNOSIS — E11.9 TYPE 2 DIABETES MELLITUS WITHOUT COMPLICATION, WITHOUT LONG-TERM CURRENT USE OF INSULIN (HCC): Chronic | ICD-10-CM

## 2020-07-27 DIAGNOSIS — E78.2 MIXED HYPERLIPIDEMIA: ICD-10-CM

## 2020-07-27 LAB
ALBUMIN SERPL BCP-MCNC: 3.7 G/DL (ref 3.5–5)
ALP SERPL-CCNC: 74 U/L (ref 46–116)
ALT SERPL W P-5'-P-CCNC: 32 U/L (ref 12–78)
ANION GAP SERPL CALCULATED.3IONS-SCNC: 6 MMOL/L (ref 4–13)
AST SERPL W P-5'-P-CCNC: 15 U/L (ref 5–45)
BILIRUB SERPL-MCNC: 0.21 MG/DL (ref 0.2–1)
BUN SERPL-MCNC: 14 MG/DL (ref 5–25)
CALCIUM SERPL-MCNC: 9.1 MG/DL (ref 8.3–10.1)
CHLORIDE SERPL-SCNC: 104 MMOL/L (ref 100–108)
CHOLEST SERPL-MCNC: 205 MG/DL (ref 50–200)
CO2 SERPL-SCNC: 28 MMOL/L (ref 21–32)
CREAT SERPL-MCNC: 0.84 MG/DL (ref 0.6–1.3)
EST. AVERAGE GLUCOSE BLD GHB EST-MCNC: 229 MG/DL
GFR SERPL CREATININE-BSD FRML MDRD: 107 ML/MIN/1.73SQ M
GLUCOSE P FAST SERPL-MCNC: 161 MG/DL (ref 65–99)
HBA1C MFR BLD: 9.6 %
HDLC SERPL-MCNC: 57 MG/DL
LDLC SERPL CALC-MCNC: 119 MG/DL (ref 0–100)
POTASSIUM SERPL-SCNC: 3.7 MMOL/L (ref 3.5–5.3)
PROT SERPL-MCNC: 7.7 G/DL (ref 6.4–8.2)
SODIUM SERPL-SCNC: 138 MMOL/L (ref 136–145)
TRIGL SERPL-MCNC: 145 MG/DL

## 2020-07-27 PROCEDURE — 36415 COLL VENOUS BLD VENIPUNCTURE: CPT

## 2020-07-27 PROCEDURE — 3046F HEMOGLOBIN A1C LEVEL >9.0%: CPT | Performed by: FAMILY MEDICINE

## 2020-07-27 PROCEDURE — 80061 LIPID PANEL: CPT

## 2020-07-27 PROCEDURE — 83036 HEMOGLOBIN GLYCOSYLATED A1C: CPT

## 2020-07-27 PROCEDURE — 80053 COMPREHEN METABOLIC PANEL: CPT

## 2020-08-05 DIAGNOSIS — E78.2 MIXED HYPERLIPIDEMIA: Primary | ICD-10-CM

## 2020-08-05 DIAGNOSIS — E11.9 TYPE 2 DIABETES MELLITUS WITHOUT COMPLICATION, WITHOUT LONG-TERM CURRENT USE OF INSULIN (HCC): Chronic | ICD-10-CM

## 2020-08-06 RX ORDER — SITAGLIPTIN 100 MG/1
TABLET, FILM COATED ORAL
Qty: 30 TABLET | Refills: 5 | Status: SHIPPED | OUTPATIENT
Start: 2020-08-06 | End: 2021-05-03

## 2020-08-06 RX ORDER — ATORVASTATIN CALCIUM 10 MG/1
TABLET, FILM COATED ORAL
Qty: 30 TABLET | Refills: 5 | Status: SHIPPED | OUTPATIENT
Start: 2020-08-06 | End: 2020-09-17 | Stop reason: DRUGHIGH

## 2020-08-25 DIAGNOSIS — E11.9 TYPE 2 DIABETES MELLITUS WITHOUT COMPLICATION, WITHOUT LONG-TERM CURRENT USE OF INSULIN (HCC): Chronic | ICD-10-CM

## 2020-08-25 NOTE — TELEPHONE ENCOUNTER
Requested medication(s) are due for refill today: Yes  Patient has already received a courtesy refill: No  Other reason request has been forwarded to provider:    PLEASE ADVISE, IT LOOKS LIKE THIS WAS D/C'D Chuy Polanco

## 2020-08-26 RX ORDER — EMPAGLIFLOZIN 10 MG/1
TABLET, FILM COATED ORAL
Qty: 30 TABLET | Refills: 0 | OUTPATIENT
Start: 2020-08-26

## 2020-09-17 ENCOUNTER — OFFICE VISIT (OUTPATIENT)
Dept: FAMILY MEDICINE CLINIC | Facility: CLINIC | Age: 45
End: 2020-09-17
Payer: COMMERCIAL

## 2020-09-17 VITALS
BODY MASS INDEX: 28.26 KG/M2 | TEMPERATURE: 97.5 F | HEART RATE: 92 BPM | OXYGEN SATURATION: 96 % | DIASTOLIC BLOOD PRESSURE: 82 MMHG | SYSTOLIC BLOOD PRESSURE: 120 MMHG | RESPIRATION RATE: 12 BRPM | WEIGHT: 191.4 LBS

## 2020-09-17 DIAGNOSIS — G62.9 NEUROPATHY: ICD-10-CM

## 2020-09-17 DIAGNOSIS — E11.9 TYPE 2 DIABETES MELLITUS WITHOUT COMPLICATION, WITHOUT LONG-TERM CURRENT USE OF INSULIN (HCC): Primary | Chronic | ICD-10-CM

## 2020-09-17 DIAGNOSIS — E78.2 MIXED HYPERLIPIDEMIA: ICD-10-CM

## 2020-09-17 DIAGNOSIS — F41.9 ANXIETY AND DEPRESSION: ICD-10-CM

## 2020-09-17 DIAGNOSIS — F32.A ANXIETY AND DEPRESSION: ICD-10-CM

## 2020-09-17 PROCEDURE — 3074F SYST BP LT 130 MM HG: CPT | Performed by: FAMILY MEDICINE

## 2020-09-17 PROCEDURE — 99214 OFFICE O/P EST MOD 30 MIN: CPT | Performed by: FAMILY MEDICINE

## 2020-09-17 PROCEDURE — 3079F DIAST BP 80-89 MM HG: CPT | Performed by: FAMILY MEDICINE

## 2020-09-17 RX ORDER — GLIPIZIDE 5 MG/1
5 TABLET, FILM COATED, EXTENDED RELEASE ORAL DAILY
Qty: 30 TABLET | Refills: 5 | Status: SHIPPED | OUTPATIENT
Start: 2020-09-17 | End: 2021-04-13 | Stop reason: SDUPTHER

## 2020-09-17 RX ORDER — ATORVASTATIN CALCIUM 20 MG/1
20 TABLET, FILM COATED ORAL DAILY
Qty: 90 TABLET | Refills: 3 | Status: SHIPPED | OUTPATIENT
Start: 2020-09-17 | End: 2021-12-28

## 2020-09-17 NOTE — PROGRESS NOTES
Assessment/Plan:    No problem-specific Assessment & Plan notes found for this encounter  Diagnoses and all orders for this visit:    Type 2 diabetes mellitus without complication, without long-term current use of insulin (HCC)  Comments:  Pt stable on exam  Carefully add Glipizide to regimen; refilled Jardiance - pt to watch for hypoglycemia, work on lower carb diet, check sugars BID, exercise  Orders:  -     Ambulatory referral to Ophthalmology; Future  -     Ambulatory referral to Podiatry; Future  -     glipiZIDE (GLUCOTROL XL) 5 mg 24 hr tablet; Take 1 tablet (5 mg total) by mouth daily  -     Comprehensive metabolic panel; Future  -     HEMOGLOBIN A1C W/ EAG ESTIMATION; Future    Neuropathy  Comments:  Pt with reported numbess, ongoing to plantar aspect of right forefoot - likely diabetic neuropathy  Ref to Podiatry (pt also needed Ophthalmology ref)  Orders:  -     Ambulatory referral to Podiatry; Future    Mixed hyperlipidemia  Comments:  Increasing Atorvastatin to 20mg QHS to try to get LDL < 100  Check FBW again, prior to next OV  Orders:  -     atorvastatin (LIPITOR) 20 mg tablet; Take 1 tablet (20 mg total) by mouth daily  -     Lipid Panel with Direct LDL reflex; Future    Anxiety and depression  Comments:  Generally stable on present management with Escitalopram - pt is looking into [facility] for his anxiety  Subjective:      Patient ID: Hanh Pendleton is a 38 y o  male  Carolina Greene is here in f/u  Recent labs done (from 07/2020) - A1c 9 6%, HDL 57,  (we had wanted him to increase the Atorvastatin to 2 of the 10mg tabs), Cr/LFTs normal   Ran out of Jardiance 2 weeks ago - A1c was already up though, with above values from 07/2020  Pt not checking sugars regularly  Has testing supplies at home  Needs an eye exam   Pt declines the Flu Vaccine today        The following portions of the patient's history were reviewed and updated as appropriate: allergies, current medications, past family history, past social history, past surgical history and problem list     Past Medical History:   Diagnosis Date    Diabetes mellitus ([facility] )      Past Medical History:   Diagnosis Date    Diabetes mellitus ([place] [address] )      Current Outpatient Medications   Medication Instructions    atorvastatin (LIPITOR) 20 mg, Oral, Daily    Empagliflozin (JARDIANCE) 25 mg, Oral, Every morning    escitalopram (LEXAPRO) 10 mg tablet Take 1 tablet by mouth once daily    glipiZIDE (GLUCOTROL XL) 5 mg, Oral, Daily    Januvia 100 MG tablet Take 1 tablet by mouth once daily    lisinopril (ZESTRIL) 10 mg, Oral, Daily    metFORMIN (GLUCOPHAGE) 1,000 mg, Oral, 2 times daily with meals    sildenafil (VIAGRA) 50 mg, Oral, Daily PRN     No Known Allergies    Social History     Tobacco Use    Smoking status: Current Every Day Smoker     Packs/day: 0 50     Types: Cigarettes    Smokeless tobacco: Never Used   Substance Use Topics    Alcohol use: Yes     Comment: Social    Drug use: No         Review of Systems   Constitutional: Negative for activity change  Respiratory: Negative for shortness of breath  Cardiovascular: Negative for chest pain  Gastrointestinal: Negative for abdominal pain and blood in stool  Endocrine:        No low sugars known  Genitourinary: Negative for frequency  Neurological: Positive for numbness  Psychiatric/Behavioral: Negative for dysphoric mood  The patient is not nervous/anxious  Objective:      /82   Pulse 92   Temp 97 5 °F (36 4 °C) (Skin)   Resp 12   Wt 86 8 kg (191 lb 6 4 oz)   SpO2 96%   BMI 28 26 kg/m²          Physical Exam  Vitals signs and nursing note reviewed  Constitutional:       General: He is not in acute distress  Appearance: Normal appearance  He is not ill-appearing, toxic-appearing or diaphoretic  HENT:      Head: Normocephalic and atraumatic  Eyes:      General: No scleral icterus       Conjunctiva/sclera: Conjunctivae normal    Neck:      Musculoskeletal: Normal range of motion and neck supple  No neck rigidity or muscular tenderness  Cardiovascular:      Rate and Rhythm: Normal rate and regular rhythm  Heart sounds: Normal heart sounds  No murmur  No friction rub  No gallop  Pulmonary:      Effort: Pulmonary effort is normal  No respiratory distress  Breath sounds: Normal breath sounds  No stridor  No wheezing, rhonchi or rales  Lymphadenopathy:      Cervical: No cervical adenopathy  Neurological:      Mental Status: He is alert and oriented to person, place, and time  Psychiatric:         Mood and Affect: Mood normal          Behavior: Behavior normal          Thought Content:  Thought content normal          Judgment: Judgment normal

## 2020-10-12 LAB
LEFT EYE DIABETIC RETINOPATHY: NORMAL
RIGHT EYE DIABETIC RETINOPATHY: NORMAL

## 2020-11-02 ENCOUNTER — VBI (OUTPATIENT)
Dept: ADMINISTRATIVE | Facility: OTHER | Age: 45
End: 2020-11-02

## 2020-11-19 ENCOUNTER — VBI (OUTPATIENT)
Dept: ADMINISTRATIVE | Facility: OTHER | Age: 45
End: 2020-11-19

## 2020-11-21 DIAGNOSIS — E11.9 TYPE 2 DIABETES MELLITUS WITHOUT COMPLICATION, WITHOUT LONG-TERM CURRENT USE OF INSULIN (HCC): Chronic | ICD-10-CM

## 2020-11-23 RX ORDER — EMPAGLIFLOZIN 10 MG/1
TABLET, FILM COATED ORAL
Qty: 30 TABLET | Refills: 5 | Status: SHIPPED | OUTPATIENT
Start: 2020-11-23 | End: 2020-11-25 | Stop reason: SDUPTHER

## 2020-11-25 DIAGNOSIS — E11.9 TYPE 2 DIABETES MELLITUS WITHOUT COMPLICATION, WITHOUT LONG-TERM CURRENT USE OF INSULIN (HCC): Chronic | ICD-10-CM

## 2020-11-25 DIAGNOSIS — F33.41 RECURRENT MAJOR DEPRESSIVE DISORDER, IN PARTIAL REMISSION (HCC): ICD-10-CM

## 2020-11-25 RX ORDER — EMPAGLIFLOZIN 10 MG/1
10 TABLET, FILM COATED ORAL EVERY MORNING
Qty: 30 TABLET | Refills: 5 | Status: SHIPPED | OUTPATIENT
Start: 2020-11-25 | End: 2021-01-12 | Stop reason: DRUGHIGH

## 2020-11-25 RX ORDER — ESCITALOPRAM OXALATE 10 MG/1
TABLET ORAL
Qty: 90 TABLET | Refills: 0 | Status: SHIPPED | OUTPATIENT
Start: 2020-11-25 | End: 2021-03-15

## 2021-01-12 ENCOUNTER — LAB (OUTPATIENT)
Dept: LAB | Facility: HOSPITAL | Age: 46
End: 2021-01-12
Payer: COMMERCIAL

## 2021-01-12 ENCOUNTER — OFFICE VISIT (OUTPATIENT)
Dept: FAMILY MEDICINE CLINIC | Facility: CLINIC | Age: 46
End: 2021-01-12
Payer: COMMERCIAL

## 2021-01-12 VITALS
WEIGHT: 201.6 LBS | SYSTOLIC BLOOD PRESSURE: 138 MMHG | BODY MASS INDEX: 29.77 KG/M2 | RESPIRATION RATE: 16 BRPM | HEART RATE: 96 BPM | DIASTOLIC BLOOD PRESSURE: 70 MMHG | TEMPERATURE: 97 F | OXYGEN SATURATION: 95 %

## 2021-01-12 DIAGNOSIS — E78.2 MIXED HYPERLIPIDEMIA: ICD-10-CM

## 2021-01-12 DIAGNOSIS — E11.9 TYPE 2 DIABETES MELLITUS WITHOUT COMPLICATION, WITHOUT LONG-TERM CURRENT USE OF INSULIN (HCC): Primary | Chronic | ICD-10-CM

## 2021-01-12 DIAGNOSIS — E11.9 TYPE 2 DIABETES MELLITUS WITHOUT COMPLICATION, WITHOUT LONG-TERM CURRENT USE OF INSULIN (HCC): Chronic | ICD-10-CM

## 2021-01-12 LAB
ALBUMIN SERPL BCP-MCNC: 4.1 G/DL (ref 3.5–5)
ALP SERPL-CCNC: 76 U/L (ref 46–116)
ALT SERPL W P-5'-P-CCNC: 44 U/L (ref 12–78)
ANION GAP SERPL CALCULATED.3IONS-SCNC: 4 MMOL/L (ref 4–13)
AST SERPL W P-5'-P-CCNC: 18 U/L (ref 5–45)
BILIRUB SERPL-MCNC: 0.26 MG/DL (ref 0.2–1)
BUN SERPL-MCNC: 13 MG/DL (ref 5–25)
CALCIUM SERPL-MCNC: 9.6 MG/DL (ref 8.3–10.1)
CHLORIDE SERPL-SCNC: 108 MMOL/L (ref 100–108)
CHOLEST SERPL-MCNC: 174 MG/DL (ref 50–200)
CO2 SERPL-SCNC: 27 MMOL/L (ref 21–32)
CREAT SERPL-MCNC: 0.76 MG/DL (ref 0.6–1.3)
EST. AVERAGE GLUCOSE BLD GHB EST-MCNC: 186 MG/DL
GFR SERPL CREATININE-BSD FRML MDRD: 111 ML/MIN/1.73SQ M
GLUCOSE P FAST SERPL-MCNC: 161 MG/DL (ref 65–99)
HBA1C MFR BLD: 8.1 %
HDLC SERPL-MCNC: 54 MG/DL
LDLC SERPL CALC-MCNC: 84 MG/DL (ref 0–100)
POTASSIUM SERPL-SCNC: 3.9 MMOL/L (ref 3.5–5.3)
PROT SERPL-MCNC: 7.9 G/DL (ref 6.4–8.2)
SODIUM SERPL-SCNC: 139 MMOL/L (ref 136–145)
TRIGL SERPL-MCNC: 181 MG/DL

## 2021-01-12 PROCEDURE — 80061 LIPID PANEL: CPT

## 2021-01-12 PROCEDURE — 3078F DIAST BP <80 MM HG: CPT | Performed by: FAMILY MEDICINE

## 2021-01-12 PROCEDURE — 36415 COLL VENOUS BLD VENIPUNCTURE: CPT

## 2021-01-12 PROCEDURE — 99213 OFFICE O/P EST LOW 20 MIN: CPT | Performed by: FAMILY MEDICINE

## 2021-01-12 PROCEDURE — 3075F SYST BP GE 130 - 139MM HG: CPT | Performed by: FAMILY MEDICINE

## 2021-01-12 PROCEDURE — 3725F SCREEN DEPRESSION PERFORMED: CPT | Performed by: FAMILY MEDICINE

## 2021-01-12 PROCEDURE — 80053 COMPREHEN METABOLIC PANEL: CPT

## 2021-01-12 PROCEDURE — 3052F HG A1C>EQUAL 8.0%<EQUAL 9.0%: CPT | Performed by: FAMILY MEDICINE

## 2021-01-12 PROCEDURE — 83036 HEMOGLOBIN GLYCOSYLATED A1C: CPT

## 2021-01-12 NOTE — PROGRESS NOTES
FAMILY PRACTICE OFFICE VISIT       NAME: Sapphire Suarez  AGE: 41 y o  SEX: male       : 1975        MRN: [id number]    DATE: 2021  TIME: 4:24 PM    Assessment and Plan   1  Type 2 diabetes mellitus without complication, without long-term current use of insulin (HCC)  Comments:  Improved control - will increase Jardiance to 25mg daily  Precautions given  Thersa Ramires is to continue a lower carb diet, and regular exercise  Orders:  -     Empagliflozin 25 MG TABS; Take 1 tablet (25 mg total) by mouth every morning  -     Comprehensive metabolic panel; Future  -     HEMOGLOBIN A1C W/ EAG ESTIMATION; Future    2  Mixed hyperlipidemia  Comments:  Controlled on present management now! Consult for Podiatry also reprinted for the pt today, and he was urged to continue to work toward full smoking cessation  Orders:  -     Lipid Panel with Direct LDL reflex; Future         There are no Patient Instructions on file for this visit  Chief Complaint     Chief Complaint   Patient presents with    Follow-up     patient is here for his diabetes       History of Present Illness   Sapphire Suarez is a 41y o -year-old male who presents in f/u - labs reviewed:  A1c down to 8 1%, LDL improved to 84, HDL 54, Cr/LFTs normal   He has greatly cut down his smoking  Review of Systems   Review of Systems   Constitutional: Negative for activity change and fever  Respiratory: Negative for shortness of breath  Cardiovascular: Negative for chest pain  Endocrine:        No low blood glucose readings  Genitourinary: Negative for dysuria  No Nocturia  Musculoskeletal: Negative for myalgias         Active Problem List     Patient Active Problem List   Diagnosis    Type 2 diabetes mellitus without complication, without long-term current use of insulin (HCC)    Mixed hyperlipidemia    Recurrent major depressive disorder, in partial remission (HCC)    Mass of hand, left    Mass of left hand         Past Medical History:  Past Medical History:   Diagnosis Date    Diabetes mellitus ([facility] [address] )        Past Surgical History:  Past Surgical History:   Procedure Laterality Date    HERNIA REPAIR      as baby    MASS EXCISION Left 1/14/2020    Procedure: left hand mass excision;  Surgeon: Valentina Blank MD;  [facility];  Service: Orthopedics       Family History:  Family History   Problem Relation Age of Onset    Hypertension Mother     Diabetes Father        Social History:  Social History     Socioeconomic History    Marital status: Single     Spouse name: Not on file    Number of children: Not on file    Years of education: Not on file    Highest education level: Not on file   Occupational History    Not on file   Social Needs    Financial resource strain: Not on file    Food insecurity     Worry: Not on file     Inability: Not on file    Transportation needs     Medical: Not on file     Non-medical: Not on file   Tobacco Use    Smoking status: Current Every Day Smoker     Packs/day: 0 50     Types: Cigarettes    Smokeless tobacco: Never Used   Substance and Sexual Activity    Alcohol use: Yes     Comment: Social    Drug use: No    Sexual activity: Yes     Partners: Female   Lifestyle    Physical activity     Days per week: Not on file     Minutes per session: Not on file    Stress: Not on file   Relationships    Social connections     Talks on phone: Not on file     Gets together: Not on file     Attends Islam service: Not on file     Active member of club or organization: Not on file     Attends meetings of clubs or organizations: Not on file     Relationship status: Not on file    Intimate partner violence     Fear of current or ex partner: Not on file     Emotionally abused: Not on file     Physically abused: Not on file     Forced sexual activity: Not on file   Other Topics Concern    Not on file   Social History Narrative    Not on file       Objective     Vitals:    01/12/21 1544   BP: 138/70   Pulse: 96   Resp: 16   Temp: (!) 97 °F (36 1 °C)   SpO2: 95%     Wt Readings from Last 3 Encounters:   01/12/21 91 4 kg (201 lb 9 6 oz)   09/17/20 86 8 kg (191 lb 6 4 oz)   06/17/20 83 5 kg (184 lb)       Physical Exam  Vitals signs and nursing note reviewed  Constitutional:       General: He is not in acute distress  Appearance: Normal appearance  He is not ill-appearing, toxic-appearing or diaphoretic  HENT:      Head: Normocephalic and atraumatic  Eyes:      General: No scleral icterus  Conjunctiva/sclera: Conjunctivae normal    Neck:      Musculoskeletal: Normal range of motion and neck supple  No neck rigidity or muscular tenderness  Cardiovascular:      Rate and Rhythm: Normal rate and regular rhythm  Heart sounds: Normal heart sounds  No murmur  No friction rub  No gallop  Pulmonary:      Effort: Pulmonary effort is normal  No respiratory distress  Breath sounds: Normal breath sounds  No stridor  No wheezing, rhonchi or rales  Lymphadenopathy:      Cervical: No cervical adenopathy  Neurological:      Mental Status: He is alert and oriented to person, place, and time  Psychiatric:         Mood and Affect: Mood normal          Behavior: Behavior normal          Thought Content:  Thought content normal          Judgment: Judgment normal          Pertinent Laboratory/Diagnostic Studies:  Lab Results   Component Value Date    BUN 13 01/12/2021    CREATININE 0 76 01/12/2021    CALCIUM 9 6 01/12/2021    K 3 9 01/12/2021    CO2 27 01/12/2021     01/12/2021     Lab Results   Component Value Date    ALT 44 01/12/2021    AST 18 01/12/2021    ALKPHOS 76 01/12/2021       Lab Results   Component Value Date    WBC 6 57 08/17/2019    HGB 16 0 08/17/2019    HCT 45 2 08/17/2019    MCV 90 08/17/2019     08/17/2019       No results found for: TSH    No results found for: CHOL  Lab Results   Component Value Date    TRIG 181 (H) 01/12/2021     Lab Results Component Value Date    HDL 54 01/12/2021     Lab Results   Component Value Date    LDLCALC 84 01/12/2021     Lab Results   Component Value Date    HGBA1C 8 1 (H) 01/12/2021       Results for orders placed or performed in visit on 01/12/21   Comprehensive metabolic panel   Result Value Ref Range    Sodium 139 136 - 145 mmol/L    Potassium 3 9 3 5 - 5 3 mmol/L    Chloride 108 100 - 108 mmol/L    CO2 27 21 - 32 mmol/L    ANION GAP 4 4 - 13 mmol/L    BUN 13 5 - 25 mg/dL    Creatinine 0 76 0 60 - 1 30 mg/dL    Glucose, Fasting 161 (H) 65 - 99 mg/dL    Calcium 9 6 8 3 - 10 1 mg/dL    AST 18 5 - 45 U/L    ALT 44 12 - 78 U/L    Alkaline Phosphatase 76 46 - 116 U/L    Total Protein 7 9 6 4 - 8 2 g/dL    Albumin 4 1 3 5 - 5 0 g/dL    Total Bilirubin 0 26 0 20 - 1 00 mg/dL    eGFR 111 ml/min/1 73sq m   Lipid Panel with Direct LDL reflex   Result Value Ref Range    Cholesterol 174 50 - 200 mg/dL    Triglycerides 181 (H) <=150 mg/dL    HDL, Direct 54 >=40 mg/dL    LDL Calculated 84 0 - 100 mg/dL   HEMOGLOBIN A1C W/ EAG ESTIMATION   Result Value Ref Range    Hemoglobin A1C 8 1 (H) Normal 3 8-5 6%; PreDiabetic 5 7-6 4%;  Diabetic >=6 5%; Glycemic control for adults with diabetes <7 0% %     mg/dl       Orders Placed This Encounter   Procedures    Comprehensive metabolic panel    Lipid Panel with Direct LDL reflex    HEMOGLOBIN A1C W/ EAG ESTIMATION       ALLERGIES:  No Known Allergies    Current Medications     Current Outpatient Medications   Medication Sig Dispense Refill    atorvastatin (LIPITOR) 20 mg tablet Take 1 tablet (20 mg total) by mouth daily 90 tablet 3    escitalopram (LEXAPRO) 10 mg tablet Take 1 tablet by mouth once daily 90 tablet 0    glipiZIDE (GLUCOTROL XL) 5 mg 24 hr tablet Take 1 tablet (5 mg total) by mouth daily 30 tablet 5    Januvia 100 MG tablet Take 1 tablet by mouth once daily 30 tablet 5    lisinopril (ZESTRIL) 10 mg tablet Take 1 tablet (10 mg total) by mouth daily 30 tablet 5    metFORMIN (GLUCOPHAGE) 1000 MG tablet Take 1 tablet (1,000 mg total) by mouth 2 (two) times a day with meals 60 tablet 5    sildenafil (VIAGRA) 50 MG tablet Take 1 tablet (50 mg total) by mouth daily as needed for erectile dysfunction 10 tablet 0    Empagliflozin 25 MG TABS Take 1 tablet (25 mg total) by mouth every morning 30 tablet 5     No current facility-administered medications for this visit  Health Maintenance     Health Maintenance   Topic Date Due    BMI: Followup Plan  09/18/1994    Annual Physical  09/18/1994    Diabetic Foot Exam  04/17/2020    HIV Screening  02/12/2021 (Originally 9/18/1991)    Influenza Vaccine (1) 06/30/2021 (Originally 9/1/2020)    Pneumococcal Vaccine: Pediatrics (0 to 5 Years) and At-Risk Patients (6 to 59 Years) (1 of 1 - PPSV23) 01/12/2022 (Originally 9/18/1982)    DTaP,Tdap,and Td Vaccines (1 - Tdap) 01/12/2022 (Originally 9/18/1997)    HEMOGLOBIN A1C  07/12/2021    BMI: Adult  01/12/2022    Depression Remission PHQ  01/12/2022    DM Eye Exam  10/12/2022    HIB Vaccine  Aged Out    Hepatitis B Vaccine  Aged Out    IPV Vaccine  Aged Out    Hepatitis A Vaccine  Aged Out    Meningococcal ACWY Vaccine  Aged Out    HPV Vaccine  Aged Out       There is no immunization history on file for this patient         Donald Calix DO

## 2021-03-13 DIAGNOSIS — F33.41 RECURRENT MAJOR DEPRESSIVE DISORDER, IN PARTIAL REMISSION (HCC): ICD-10-CM

## 2021-03-15 RX ORDER — ESCITALOPRAM OXALATE 10 MG/1
TABLET ORAL
Qty: 90 TABLET | Refills: 0 | Status: SHIPPED | OUTPATIENT
Start: 2021-03-15 | End: 2021-08-30

## 2021-04-12 ENCOUNTER — APPOINTMENT (OUTPATIENT)
Dept: LAB | Facility: HOSPITAL | Age: 46
End: 2021-04-12
Payer: COMMERCIAL

## 2021-04-12 DIAGNOSIS — E78.2 MIXED HYPERLIPIDEMIA: ICD-10-CM

## 2021-04-12 DIAGNOSIS — E11.9 TYPE 2 DIABETES MELLITUS WITHOUT COMPLICATION, WITHOUT LONG-TERM CURRENT USE OF INSULIN (HCC): Chronic | ICD-10-CM

## 2021-04-12 LAB
ALBUMIN SERPL BCP-MCNC: 3.5 G/DL (ref 3.5–5)
ALP SERPL-CCNC: 89 U/L (ref 46–116)
ALT SERPL W P-5'-P-CCNC: 45 U/L (ref 12–78)
ANION GAP SERPL CALCULATED.3IONS-SCNC: 5 MMOL/L (ref 4–13)
AST SERPL W P-5'-P-CCNC: 25 U/L (ref 5–45)
BILIRUB SERPL-MCNC: 0.3 MG/DL (ref 0.2–1)
BUN SERPL-MCNC: 12 MG/DL (ref 5–25)
CALCIUM SERPL-MCNC: 9.1 MG/DL (ref 8.3–10.1)
CHLORIDE SERPL-SCNC: 106 MMOL/L (ref 100–108)
CHOLEST SERPL-MCNC: 167 MG/DL (ref 50–200)
CO2 SERPL-SCNC: 26 MMOL/L (ref 21–32)
CREAT SERPL-MCNC: 0.94 MG/DL (ref 0.6–1.3)
EST. AVERAGE GLUCOSE BLD GHB EST-MCNC: 200 MG/DL
GFR SERPL CREATININE-BSD FRML MDRD: 98 ML/MIN/1.73SQ M
GLUCOSE P FAST SERPL-MCNC: 177 MG/DL (ref 65–99)
HBA1C MFR BLD: 8.6 %
HDLC SERPL-MCNC: 53 MG/DL
LDLC SERPL CALC-MCNC: 43 MG/DL (ref 0–100)
POTASSIUM SERPL-SCNC: 3.9 MMOL/L (ref 3.5–5.3)
PROT SERPL-MCNC: 7.6 G/DL (ref 6.4–8.2)
SODIUM SERPL-SCNC: 137 MMOL/L (ref 136–145)
TRIGL SERPL-MCNC: 356 MG/DL

## 2021-04-12 PROCEDURE — 80061 LIPID PANEL: CPT

## 2021-04-12 PROCEDURE — 36415 COLL VENOUS BLD VENIPUNCTURE: CPT

## 2021-04-12 PROCEDURE — 3052F HG A1C>EQUAL 8.0%<EQUAL 9.0%: CPT | Performed by: FAMILY MEDICINE

## 2021-04-12 PROCEDURE — 83036 HEMOGLOBIN GLYCOSYLATED A1C: CPT

## 2021-04-12 PROCEDURE — 80053 COMPREHEN METABOLIC PANEL: CPT

## 2021-04-13 ENCOUNTER — OFFICE VISIT (OUTPATIENT)
Dept: FAMILY MEDICINE CLINIC | Facility: CLINIC | Age: 46
End: 2021-04-13
Payer: COMMERCIAL

## 2021-04-13 VITALS
WEIGHT: 198.6 LBS | SYSTOLIC BLOOD PRESSURE: 130 MMHG | OXYGEN SATURATION: 96 % | HEART RATE: 103 BPM | TEMPERATURE: 97.7 F | BODY MASS INDEX: 29.33 KG/M2 | RESPIRATION RATE: 16 BRPM | DIASTOLIC BLOOD PRESSURE: 80 MMHG

## 2021-04-13 DIAGNOSIS — G62.9 NEUROPATHY: ICD-10-CM

## 2021-04-13 DIAGNOSIS — E78.2 MIXED HYPERLIPIDEMIA: ICD-10-CM

## 2021-04-13 DIAGNOSIS — E11.9 TYPE 2 DIABETES MELLITUS WITHOUT COMPLICATION, WITHOUT LONG-TERM CURRENT USE OF INSULIN (HCC): Primary | Chronic | ICD-10-CM

## 2021-04-13 PROCEDURE — 99214 OFFICE O/P EST MOD 30 MIN: CPT | Performed by: FAMILY MEDICINE

## 2021-04-13 RX ORDER — GABAPENTIN 300 MG/1
300 CAPSULE ORAL
Qty: 30 CAPSULE | Refills: 5 | Status: SHIPPED | OUTPATIENT
Start: 2021-04-13

## 2021-04-13 RX ORDER — GLIPIZIDE 10 MG/1
10 TABLET, FILM COATED, EXTENDED RELEASE ORAL DAILY
Qty: 30 TABLET | Refills: 5 | Status: SHIPPED | OUTPATIENT
Start: 2021-04-13

## 2021-04-13 NOTE — PROGRESS NOTES
[address] [facility]  The following is per review of patient's pertinent medical/medication history:     Patient's insurance coverage:  Payor: BLUE CROSS / Plan: [employer] PLAN 361 / Product Type: Blue Fee for Service /        Findings:     Per dispense history patient is non compliant with regimen:  1  Metformin - 30DS last dispensed 2/2/21  2  Januvia - 30 DS last dispensed 3/24/21  3  Jardiance - 30 DS last dispensed 2/4/21  4  Glipizide - 30 DS last dispensed 2/2/21    Also non compliant with ACEI and statin    Did he lose insurance? Experience side effects?        Hemoglobin A1C (%)   Date Value   04/12/2021 8 6 (H)   01/12/2021 8 1 (H)   07/27/2020 9 6 (H)     eGFR (ml/min/1 73sq m)   Date Value   04/12/2021 98   01/12/2021 111   07/27/2020 107     The 10-year ASCVD risk score (Alan Malcolm et al , 2013) is: 8 7%    Values used to calculate the score:      Age: 41 years      Sex: Male      Is Non- : No      Diabetic: Yes      Tobacco smoker: Yes      Systolic Blood Pressure: 718 mmHg      Is BP treated: Yes      HDL Cholesterol: 53 mg/dL      Total Cholesterol: 167 mg/dL       Recommendations:   · Pharmacist to follow for recommendations/assistance pending reason for non compliance  · Consider performing foot exam today    Demographics  Interaction Method: Virtual    Topic(s) Addressed  Diabetes    Intervention(s) Made      Non-Pharmacologic:  Adherence Addressed    Tool(s) Used  Other    Time Spent:       Time Spent in Care Coordination: 25 minutes    Recommendation(s) Accepted by the Patient/Caregiver: Not Applicable

## 2021-04-13 NOTE — PROGRESS NOTES
FAMILY PRACTICE OFFICE VISIT       NAME: Andre Carreno  AGE: 41 y o  SEX: male       : 1975        MRN: [id number]    DATE: 2021  TIME: 5:51 PM    Assessment and Plan   1  Type 2 diabetes mellitus without complication, without long-term current use of insulin ([facility])  Comments:  Pt stable - long discussion with pt & his wife today  Pt to focus on improved, lower carb diet, & regular exercise  Increase Glipizide dose carefully  Orders:  -     glipiZIDE (GLUCOTROL XL) 10 mg 24 hr tablet; Take 1 tablet (10 mg total) by mouth daily  -     Comprehensive metabolic panel; Future  -     HEMOGLOBIN A1C W/ EAG ESTIMATION; Future    2  Mixed hyperlipidemia  Comments:  Stable with Atorvastatin; Triglycerides up with elevated gluse levels  Repeat FBW prior to next OV  Orders:  -     Lipid Panel with Direct LDL reflex; Future    3  Neuropathy  Comments:  Ongoing in feet  Will try Gabapentin at bedtime  Referral to Podiatry again re-printed for the pt  Orders:  -     gabapentin (NEURONTIN) 300 mg capsule; Take 1 capsule (300 mg total) by mouth daily at bedtime         There are no Patient Instructions on file for this visit  Chief Complaint     Chief Complaint   Patient presents with    Follow-up       History of Present Illness   Andre Carreno is a 41y o -year-old male who presents in f/u today  Labs reviewed with patient and wife:  A1c up some to 8 6%, Trig 356, HDL 53, LDL 46  Likes candy and regular soda  We reprinted slip for referral to Podiatry again today  No pain in his feet, only tingling  Eye exam is UTD  Review of Systems   Review of Systems   Constitutional: Negative for activity change  Eyes: Negative for visual disturbance  Respiratory: Negative for shortness of breath  Cardiovascular: Negative for chest pain  Neurological: Positive for numbness         Active Problem List     Patient Active Problem List   Diagnosis    Type 2 diabetes mellitus without complication, without long-term current use of insulin (HCC)    Mixed hyperlipidemia    Recurrent major depressive disorder, in partial remission ([facility] [address] )    Mass of hand, left    Mass of left hand         Past Medical History:  Past Medical History:   Diagnosis Date    [facility]        Past Surgical History:  Past Surgical History:   Procedure Laterality Date    HERNIA REPAIR      as baby    MASS EXCISION Left 1/14/2020    Procedure: left hand mass excision;  Surgeon: Mary Ortega MD;  Location: [facility];  Service: Orthopedics       Family History:  Family History   Problem Relation Age of Onset    Hypertension Mother     Diabetes Father        Social History:  Social History     Socioeconomic History    Marital status: Single     Spouse name: Not on file    Number of children: Not on file    Years of education: Not on file    Highest education level: Not on file   Occupational History    Not on file   Social Needs    Financial resource strain: Not on file    Food insecurity     Worry: Not on file     Inability: Not on file    Transportation needs     Medical: Not on file     Non-medical: Not on file   Tobacco Use    Smoking status: Current Every Day Smoker     Packs/day: 0 50     Types: Cigarettes    Smokeless tobacco: Never Used   Substance and Sexual Activity    Alcohol use: Yes     Comment: Social    Drug use: No    Sexual activity: Yes     Partners: Female   Lifestyle    Physical activity     Days per week: Not on file     Minutes per session: Not on file    Stress: Not on file   Relationships    Social connections     Talks on phone: Not on file     Gets together: Not on file     Attends Ep[employer] service: Not on file     Active member of club or organization: Not on file     Attends meetings of clubs or organizations: Not on file     Relationship status: Not on file    Intimate partner violence     Fear of current or ex partner: Not on file     Emotionally abused: Not on file Physically abused: Not on file     Forced sexual activity: Not on file   Other Topics Concern    Not on file   Social History Narrative    Not on file       Objective     Vitals:    04/13/21 1506   BP: 130/80   Pulse: 103   Resp: 16   Temp: 97 7 °F (36 5 °C)   SpO2: 96%     Wt Readings from Last 3 Encounters:   04/13/21 90 1 kg (198 lb 9 6 oz)   01/12/21 91 4 kg (201 lb 9 6 oz)   09/17/20 86 8 kg (191 lb 6 4 oz)       Physical Exam  Vitals signs and nursing note reviewed  Constitutional:       General: He is not in acute distress  Appearance: Normal appearance  He is not ill-appearing, toxic-appearing or diaphoretic  HENT:      Head: Normocephalic and atraumatic  Eyes:      General: No scleral icterus  Conjunctiva/sclera: Conjunctivae normal    Neck:      Musculoskeletal: Normal range of motion and neck supple  No neck rigidity or muscular tenderness  Cardiovascular:      Rate and Rhythm: Normal rate and regular rhythm  Pulses: no weak pulses          Dorsalis pedis pulses are 2+ on the right side and 2+ on the left side  Posterior tibial pulses are 2+ on the right side and 2+ on the left side  Heart sounds: Normal heart sounds  No murmur  No friction rub  No gallop  Pulmonary:      Effort: Pulmonary effort is normal  No respiratory distress  Breath sounds: Normal breath sounds  No stridor  No wheezing, rhonchi or rales  Abdominal:      General: Abdomen is flat  Bowel sounds are normal  There is no distension  Palpations: Abdomen is soft  There is no mass  Tenderness: There is no abdominal tenderness  There is no guarding or rebound  Feet:      Right foot:      Skin integrity: No ulcer, skin breakdown, erythema, warmth, callus or dry skin  Left foot:      Skin integrity: No ulcer, skin breakdown, erythema, warmth, callus or dry skin  Lymphadenopathy:      Cervical: No cervical adenopathy     Neurological:      Mental Status: He is alert and oriented to person, place, and time  Psychiatric:         Mood and Affect: Mood normal          Behavior: Behavior normal          Thought Content: Thought content normal          Judgment: Judgment normal      Right Foot/Ankle   Right Foot Inspection  Skin Exam: skin normal and skin intact no dry skin, no warmth, no callus, no erythema, no maceration, no abnormal color, no pre-ulcer, no ulcer and no callus                          Toe Exam: ROM and strength within normal limits  Sensory       Monofilament testing: intact  Vascular  Capillary refills: < 3 seconds  The right DP pulse is 2+  The right PT pulse is 2+  Left Foot/Ankle  Left Foot Inspection  Skin Exam: skin normal and skin intactno dry skin, no warmth, no erythema, no maceration, normal color, no pre-ulcer, no ulcer and no callus                         Toe Exam: ROM and strength within normal limits                   Sensory       Monofilament: intact  Vascular  Capillary refills: < 3 seconds  The left DP pulse is 2+  The left PT pulse is 2+  Assign Risk Category:  No deformity present; No loss of protective sensation;  No weak pulses       Risk: 0        Pertinent Laboratory/Diagnostic Studies:  Lab Results   Component Value Date    BUN 12 04/12/2021    CREATININE 0 94 04/12/2021    CALCIUM 9 1 04/12/2021    K 3 9 04/12/2021    CO2 26 04/12/2021     04/12/2021     Lab Results   Component Value Date    ALT 45 04/12/2021    AST 25 04/12/2021    ALKPHOS 89 04/12/2021       Lab Results   Component Value Date    WBC 6 57 08/17/2019    HGB 16 0 08/17/2019    HCT 45 2 08/17/2019    MCV 90 08/17/2019     08/17/2019       No results found for: TSH    No results found for: CHOL  Lab Results   Component Value Date    TRIG 356 (H) 04/12/2021     Lab Results   Component Value Date    HDL 53 04/12/2021     Lab Results   Component Value Date    LDLCALC 43 04/12/2021     Lab Results   Component Value Date    HGBA1C 8 6 (H) 04/12/2021       Results for orders placed or performed in visit on 04/12/21   Comprehensive metabolic panel   Result Value Ref Range    Sodium 137 136 - 145 mmol/L    Potassium 3 9 3 5 - 5 3 mmol/L    Chloride 106 100 - 108 mmol/L    CO2 26 21 - 32 mmol/L    ANION GAP 5 4 - 13 mmol/L    BUN 12 5 - 25 mg/dL    Creatinine 0 94 0 60 - 1 30 mg/dL    Glucose, Fasting 177 (H) 65 - 99 mg/dL    Calcium 9 1 8 3 - 10 1 mg/dL    AST 25 5 - 45 U/L    ALT 45 12 - 78 U/L    Alkaline Phosphatase 89 46 - 116 U/L    Total Protein 7 6 6 4 - 8 2 g/dL    Albumin 3 5 3 5 - 5 0 g/dL    Total Bilirubin 0 30 0 20 - 1 00 mg/dL    eGFR 98 ml/min/1 73sq m   Lipid Panel with Direct LDL reflex   Result Value Ref Range    Cholesterol 167 50 - 200 mg/dL    Triglycerides 356 (H) <=150 mg/dL    HDL, Direct 53 >=40 mg/dL    LDL Calculated 43 0 - 100 mg/dL   HEMOGLOBIN A1C W/ EAG ESTIMATION   Result Value Ref Range    Hemoglobin A1C 8 6 (H) Normal 3 8-5 6%; PreDiabetic 5 7-6 4%;  Diabetic >=6 5%; Glycemic control for adults with diabetes <7 0% %     mg/dl       Orders Placed This Encounter   Procedures    Comprehensive metabolic panel    Lipid Panel with Direct LDL reflex    HEMOGLOBIN A1C W/ EAG ESTIMATION       ALLERGIES:  No Known Allergies    Current Medications     Current Outpatient Medications   Medication Sig Dispense Refill    atorvastatin (LIPITOR) 20 mg tablet Take 1 tablet (20 mg total) by mouth daily 90 tablet 3    Empagliflozin 25 MG TABS Take 1 tablet (25 mg total) by mouth every morning 30 tablet 5    escitalopram (LEXAPRO) 10 mg tablet Take 1 tablet by mouth once daily 90 tablet 0    glipiZIDE (GLUCOTROL XL) 10 mg 24 hr tablet Take 1 tablet (10 mg total) by mouth daily 30 tablet 5    Januvia 100 MG tablet Take 1 tablet by mouth once daily 30 tablet 5    lisinopril (ZESTRIL) 10 mg tablet Take 1 tablet (10 mg total) by mouth daily 30 tablet 5    metFORMIN (GLUCOPHAGE) 1000 MG tablet Take 1 tablet (1,000 mg total) by mouth 2 (two) times a day with meals 60 tablet 5    sildenafil (VIAGRA) 50 MG tablet Take 1 tablet (50 mg total) by mouth daily as needed for erectile dysfunction 10 tablet 0    gabapentin (NEURONTIN) 300 mg capsule Take 1 capsule (300 mg total) by mouth daily at bedtime 30 capsule 5     No current facility-administered medications for this visit  Health Maintenance     Health Maintenance   Topic Date Due    COVID-19 Vaccine (1) Never done    Annual Physical  Never done    Diabetic Foot Exam  04/17/2020    Influenza Vaccine (1) 06/30/2021 (Originally 9/1/2020)    Pneumococcal Vaccine: Pediatrics (0 to 5 Years) and At-Risk Patients (6 to 59 Years) (1 of 1 - PPSV23) 01/12/2022 (Originally 9/18/1982)    DTaP,Tdap,and Td Vaccines (1 - Tdap) 01/12/2022 (Originally 9/18/1997)    HIV Screening  04/14/2023 (Originally 9/18/1991)    HEMOGLOBIN A1C  10/12/2021    Depression Remission PHQ  01/12/2022    BMI: Adult  04/13/2022    DM Eye Exam  10/12/2022    HIB Vaccine  Aged Out    Hepatitis B Vaccine  Aged Out    IPV Vaccine  Aged Out    Hepatitis A Vaccine  Aged Out    Meningococcal ACWY Vaccine  Aged Out    HPV Vaccine  Aged Out       There is no immunization history on file for this patient         Cami Coello DO

## 2021-05-03 DIAGNOSIS — E11.9 TYPE 2 DIABETES MELLITUS WITHOUT COMPLICATION, WITHOUT LONG-TERM CURRENT USE OF INSULIN (HCC): Chronic | ICD-10-CM

## 2021-05-03 RX ORDER — SITAGLIPTIN 100 MG/1
TABLET, FILM COATED ORAL
Qty: 30 TABLET | Refills: 5 | Status: SHIPPED | OUTPATIENT
Start: 2021-05-03

## 2021-05-30 DIAGNOSIS — E11.9 TYPE 2 DIABETES MELLITUS WITHOUT COMPLICATION, WITHOUT LONG-TERM CURRENT USE OF INSULIN (HCC): Chronic | ICD-10-CM

## 2021-06-01 DIAGNOSIS — E11.9 TYPE 2 DIABETES MELLITUS WITHOUT COMPLICATION, WITHOUT LONG-TERM CURRENT USE OF INSULIN (HCC): Chronic | ICD-10-CM

## 2021-06-01 RX ORDER — GLIPIZIDE 5 MG/1
5 TABLET, FILM COATED, EXTENDED RELEASE ORAL DAILY
Qty: 30 TABLET | Refills: 5 | Status: SHIPPED | OUTPATIENT
Start: 2021-06-01 | End: 2021-11-23 | Stop reason: DRUGHIGH

## 2021-06-01 RX ORDER — GLIPIZIDE 5 MG/1
TABLET, FILM COATED, EXTENDED RELEASE ORAL
Qty: 30 TABLET | Refills: 5 | Status: SHIPPED | OUTPATIENT
Start: 2021-06-01 | End: 2021-06-01 | Stop reason: SDUPTHER

## 2021-07-21 ENCOUNTER — OFFICE VISIT (OUTPATIENT)
Dept: PODIATRY | Facility: CLINIC | Age: 46
End: 2021-07-21
Payer: COMMERCIAL

## 2021-07-21 VITALS
WEIGHT: 199.6 LBS | HEART RATE: 93 BPM | DIASTOLIC BLOOD PRESSURE: 81 MMHG | SYSTOLIC BLOOD PRESSURE: 124 MMHG | BODY MASS INDEX: 29.56 KG/M2 | HEIGHT: 69 IN

## 2021-07-21 DIAGNOSIS — R20.2 RIGHT LEG PARESTHESIAS: Primary | ICD-10-CM

## 2021-07-21 DIAGNOSIS — E11.9 TYPE 2 DIABETES MELLITUS WITHOUT COMPLICATION, WITHOUT LONG-TERM CURRENT USE OF INSULIN (HCC): Chronic | ICD-10-CM

## 2021-07-21 PROCEDURE — 3008F BODY MASS INDEX DOCD: CPT | Performed by: PODIATRIST

## 2021-07-21 PROCEDURE — 99243 OFF/OP CNSLTJ NEW/EST LOW 30: CPT | Performed by: PODIATRIST

## 2021-07-21 NOTE — LETTER
July 29, 2021     Jenna Morse, [address]  [address] [address]    Patient: Lana Rodriguez   YOB: 1975   Date of Visit: 7/21/2021       Dear Dr Elena Hardy:    Thank you for referring Bakari Gutierrez to me for evaluation  Below are my notes for this consultation  If you have questions, please do not hesitate to call me  I look forward to following your patient along with you  Sincerely,        Mirella Aranda DPM        CC: No Recipients  RICHIE Thao [facility]  7/22/2021  2:33 PM  Signed  This patient was seen on 7/21/21  My role is Foot , Ankle, and Wound Specialist    SUBJECTIVE    Chief Complaint:  Tingling and numbness in the Right foot     Patient ID: Lana Rodriguez is a 41 y o  male  Rhianna is here for the first time with a cc of tingling, a "thick feeling" and some numbness in his Left foot  He is a diabetic  The following portions of the patient's history were reviewed and updated as appropriate: allergies, current medications, past family history, past medical history, past social history, past surgical history and problem list     Review of Systems   Constitutional: Negative  Respiratory: Negative  Cardiovascular: Negative  Gastrointestinal: Negative  Musculoskeletal: Negative  Neurological: Positive for numbness  OBJECTIVE      /81   Pulse 93   Ht 5' 9" (1 753 m) Comment: verbal  Wt 90 5 kg (199 lb 9 6 oz)   BMI 29 48 kg/m²     Foot/Ankle Musculoskeletal Exam    General      Neurological: alert    Neurovascular      Neurovascular - Right        Dorsalis pedis: 3+      Posterior tibial: 3+      Neurovascular - Left        Dorsalis pedis: 3+      Posterior tibial: 3+       Physical Exam  Vitals and nursing note reviewed  Constitutional:       General: He is not in acute distress  Appearance: Normal appearance  He is not ill-appearing, toxic-appearing or diaphoretic     Cardiovascular:      Rate and Rhythm: Normal rate  Pulses:           Dorsalis pedis pulses are 3+ on the right side and 3+ on the left side  Posterior tibial pulses are 3+ on the right side and 3+ on the left side  Pulmonary:      Effort: Pulmonary effort is normal    Musculoskeletal:         General: Normal range of motion  Feet:      Right foot:      Protective Sensation: 10 sites tested  10 sites sensed  Left foot:      Protective Sensation: 10 sites tested  10 sites sensed  Skin:     Capillary Refill: Capillary refill takes less than 2 seconds  Neurological:      General: No focal deficit present  Mental Status: He is alert and oriented to person, place, and time  ASSESSMENT     Diagnoses and all orders for this visit:    Right leg paresthesias    Type 2 diabetes mellitus without complication, without long-term current use of insulin ([facility] )         Problem List Items Addressed This Visit        Endocrine    Type 2 diabetes mellitus without complication, without long-term current use of insulin ([facility]) (Chronic)       Other    Right leg paresthesias - Primary            Problems:    PLAN    4/12/21 HbA1C noted to be 8 6%  1/12/21 HbA1C noted to be 8 1%  7/27/21 HbA1C noted to be 9 6%    I discussed his poor glucose control with him and told him I feel the paresthesias are directly related to the glucose  I discussed what diabetic peripheral neuropathy is and that the primary way to control neuropathic symptoms is glucose control  His nerves are still functional and he can perceive a 10 gram monofilament at all sites tested  I recommended a daily walking regimen to help in weight loss and glucose control and also to renew his efforts to follow a low carb diet  I explained that there are oral medications to control the neuropathy symptoms but he's not interested in oral medications

## 2021-07-22 PROBLEM — R20.2 RIGHT LEG PARESTHESIAS: Status: ACTIVE | Noted: 2021-07-22

## 2021-07-22 NOTE — PATIENT INSTRUCTIONS
Foot Care for People with Diabetes   WHAT YOU NEED TO KNOW:   · Foot care helps protect your feet and prevent foot ulcers or sores  Long-term high blood sugar levels can damage the blood vessels and nerves in your legs and feet  This damage makes it hard to feel pressure, pain, temperature, and touch  You may not be able to feel a cut or sore, or shoes that are too tight  Foot care is needed to prevent serious problems, such as an infection or amputation  · Diabetes may cause your toes to become crooked or curved under  These changes may affect the way you walk and can lead to increased pressure on your foot  The pressure can decrease blood flow to your feet  Lack of blood flow increases your risk for a foot ulcer  Do not ignore small problems, such as dry skin or small wounds  These can become life-threatening over time without proper care  DISCHARGE INSTRUCTIONS:   Call your care team provider if:   · Your feet become numb, weak, or hard to move  · You have pus draining from a sore on your foot  · You have a wound on your foot that gets bigger, deeper, or does not heal      · You see blisters, cuts, scratches, calluses, or sores on your foot  · You have a fever, and your feet become red, warm, and swollen  · Your toenails become thick, curled, or yellow  · You find it hard to check your feet because your vision is poor  · You have questions or concerns about your condition or care  Foot care:   · Check your feet each day  Look at your whole foot, including the bottom, and between and under your toes  Check for wounds, corns, and calluses  Use a mirror to see the bottom of your feet  The skin on your feet may be shiny, tight, or darker than normal  Your feet may also be cold and pale  Feel your feet by running your hands along the tops, bottoms, sides, and between your toes  Redness, swelling, and warmth are signs of blood flow problems that can lead to a foot ulcer   Do not try to remove corns or calluses yourself  · Wash your feet each day with soap and warm water  Do not use hot water, because this can injure your foot  Dry your feet gently with a towel after you wash them  Dry between and under your toes  · Apply lotion or a moisturizer on your dry feet  Ask your care team provider what lotions are best to use  Do not put lotion or moisturizer between your toes  Moisture between your toes could lead to skin breakdown  · Cut your toenails correctly  File or cut your toenails straight across  Use a soft brush to clean around your toenails  If your toenails are very thick, you may need to have a care team provider or specialist cut them  · Protect your feet  Do not walk barefoot or wear your shoes without socks  Check your shoes for rocks or other objects that can hurt your feet  Wear cotton socks to help keep your feet dry  Wear socks without toe seams, or wear them with the seams inside out  Change your socks each day  Do not wear socks that are dirty or damp  · Wear shoes that fit well  Wear shoes that do not rub against any area of your feet  Your shoes should be ½ to ¾ inch (1 to 2 centimeters) longer than your feet  Your shoes should also have extra space around the widest part of your feet  Walking or athletic shoes with laces or straps that adjust are best  Ask your care team provider for help to choose shoes that fit you best  Ask him or her if you need to wear an insert, orthotic, or bandage on your feet  · Do not smoke  Smoking can damage your blood vessels and put you at increased risk for foot ulcers  Ask your care team provider for information if you currently smoke and need help to quit  E-cigarettes or smokeless tobacco still contain nicotine  Talk to your care team provider before you use these products  Follow up with your diabetes care team provider or foot specialist as directed:   You will need to have your feet checked at least once a conchita Morales may need a foot exam more often if you have nerve damage, foot deformities, or ulcers  Write down your questions so you remember to ask them during your visits  © Copyright [employer] 2021 Information is for End User's use only and may not be sold, redistributed or otherwise used for commercial purposes  All illustrations and images included in CareNotes® are the copyrighted property of A D A M , Inc  or [facility] Karime Sin   The above information is an  only  It is not intended as medical advice for individual conditions or treatments  Talk to your doctor, nurse or pharmacist before following any medical regimen to see if it is safe and effective for you

## 2021-07-22 NOTE — PROGRESS NOTES
This patient was seen on 7/21/21  My role is Foot , Ankle, and Wound Specialist    SUBJECTIVE    Chief Complaint:  Tingling and numbness in the Right foot     Patient ID: Jon Forte is a 41 y o  male  Di Salcedo is here for the first time with a cc of tingling, a "thick feeling" and some numbness in his Left foot  He is a diabetic  The following portions of the patient's history were reviewed and updated as appropriate: allergies, current medications, past family history, past medical history, past social history, past surgical history and problem list     Review of Systems   Constitutional: Negative  Respiratory: Negative  Cardiovascular: Negative  Gastrointestinal: Negative  Musculoskeletal: Negative  Neurological: Positive for numbness  OBJECTIVE      /81   Pulse 93   Ht 5' 9" (1 753 m) Comment: verbal  Wt 90 5 kg (199 lb 9 6 oz)   BMI 29 48 kg/m²     Foot/Ankle Musculoskeletal Exam    General      Neurological: alert    Neurovascular      Neurovascular - Right        Dorsalis pedis: 3+      Posterior tibial: 3+      Neurovascular - Left        Dorsalis pedis: 3+      Posterior tibial: 3+       Physical Exam  Vitals and nursing note reviewed  Constitutional:       General: He is not in acute distress  Appearance: Normal appearance  He is not ill-appearing, toxic-appearing or diaphoretic  Cardiovascular:      Rate and Rhythm: Normal rate  Pulses:           Dorsalis pedis pulses are 3+ on the right side and 3+ on the left side  Posterior tibial pulses are 3+ on the right side and 3+ on the left side  Pulmonary:      Effort: Pulmonary effort is normal    Musculoskeletal:         General: Normal range of motion  Feet:      Right foot:      Protective Sensation: 10 sites tested  10 sites sensed  Left foot:      Protective Sensation: 10 sites tested  10 sites sensed  Skin:     Capillary Refill: Capillary refill takes less than 2 seconds  Neurological:      General: No focal deficit present  Mental Status: He is alert and oriented to person, place, and time  ASSESSMENT     Diagnoses and all orders for this visit:    Right leg paresthesias    Type 2 diabetes mellitus without complication, without long-term current use of insulin ([facility] )         Problem List Items Addressed This Visit        Endocrine    Type 2 diabetes mellitus without complication, without long-term current use of insulin ([facility]) (Chronic)       Other    Right leg paresthesias - Primary            Problems:    PLAN    4/12/21 HbA1C noted to be 8 6%  1/12/21 HbA1C noted to be 8 1%  7/27/21 HbA1C noted to be 9 6%    I discussed his poor glucose control with him and told him I feel the paresthesias are directly related to the glucose  I discussed what diabetic peripheral neuropathy is and that the primary way to control neuropathic symptoms is glucose control  His nerves are still functional and he can perceive a 10 gram monofilament at all sites tested  I recommended a daily walking regimen to help in weight loss and glucose control and also to renew his efforts to follow a low carb diet  I explained that there are oral medications to control the neuropathy symptoms but he's not interested in oral medications

## 2021-08-29 DIAGNOSIS — F33.41 RECURRENT MAJOR DEPRESSIVE DISORDER, IN PARTIAL REMISSION (HCC): ICD-10-CM

## 2021-08-30 RX ORDER — ESCITALOPRAM OXALATE 10 MG/1
TABLET ORAL
Qty: 90 TABLET | Refills: 0 | Status: SHIPPED | OUTPATIENT
Start: 2021-08-30 | End: 2022-03-14

## 2021-09-20 ENCOUNTER — APPOINTMENT (OUTPATIENT)
Dept: LAB | Facility: HOSPITAL | Age: 46
End: 2021-09-20
Payer: COMMERCIAL

## 2021-09-20 DIAGNOSIS — E78.2 MIXED HYPERLIPIDEMIA: ICD-10-CM

## 2021-09-20 DIAGNOSIS — E11.9 TYPE 2 DIABETES MELLITUS WITHOUT COMPLICATION, WITHOUT LONG-TERM CURRENT USE OF INSULIN (HCC): Chronic | ICD-10-CM

## 2021-09-20 LAB
ALBUMIN SERPL BCP-MCNC: 3.5 G/DL (ref 3.5–5)
ALP SERPL-CCNC: 90 U/L (ref 46–116)
ALT SERPL W P-5'-P-CCNC: 45 U/L (ref 12–78)
ANION GAP SERPL CALCULATED.3IONS-SCNC: 7 MMOL/L (ref 4–13)
AST SERPL W P-5'-P-CCNC: 16 U/L (ref 5–45)
BILIRUB SERPL-MCNC: 0.27 MG/DL (ref 0.2–1)
BUN SERPL-MCNC: 14 MG/DL (ref 5–25)
CALCIUM SERPL-MCNC: 9.1 MG/DL (ref 8.3–10.1)
CHLORIDE SERPL-SCNC: 106 MMOL/L (ref 100–108)
CHOLEST SERPL-MCNC: 206 MG/DL (ref 50–200)
CO2 SERPL-SCNC: 23 MMOL/L (ref 21–32)
CREAT SERPL-MCNC: 0.72 MG/DL (ref 0.6–1.3)
EST. AVERAGE GLUCOSE BLD GHB EST-MCNC: 214 MG/DL
GFR SERPL CREATININE-BSD FRML MDRD: 113 ML/MIN/1.73SQ M
GLUCOSE P FAST SERPL-MCNC: 230 MG/DL (ref 65–99)
HBA1C MFR BLD: 9.1 %
HDLC SERPL-MCNC: 54 MG/DL
LDLC SERPL CALC-MCNC: 92 MG/DL (ref 0–100)
POTASSIUM SERPL-SCNC: 4.2 MMOL/L (ref 3.5–5.3)
PROT SERPL-MCNC: 7.3 G/DL (ref 6.4–8.2)
SODIUM SERPL-SCNC: 136 MMOL/L (ref 136–145)
TRIGL SERPL-MCNC: 302 MG/DL

## 2021-09-20 PROCEDURE — 36415 COLL VENOUS BLD VENIPUNCTURE: CPT

## 2021-09-20 PROCEDURE — 83036 HEMOGLOBIN GLYCOSYLATED A1C: CPT

## 2021-09-20 PROCEDURE — 80061 LIPID PANEL: CPT

## 2021-09-20 PROCEDURE — 80053 COMPREHEN METABOLIC PANEL: CPT

## 2021-09-20 PROCEDURE — 3046F HEMOGLOBIN A1C LEVEL >9.0%: CPT | Performed by: FAMILY MEDICINE

## 2021-09-21 ENCOUNTER — OFFICE VISIT (OUTPATIENT)
Dept: FAMILY MEDICINE CLINIC | Facility: CLINIC | Age: 46
End: 2021-09-21
Payer: COMMERCIAL

## 2021-09-21 VITALS
HEART RATE: 102 BPM | SYSTOLIC BLOOD PRESSURE: 160 MMHG | HEIGHT: 70 IN | WEIGHT: 194 LBS | BODY MASS INDEX: 27.77 KG/M2 | OXYGEN SATURATION: 97 % | DIASTOLIC BLOOD PRESSURE: 90 MMHG | RESPIRATION RATE: 14 BRPM | TEMPERATURE: 98.1 F

## 2021-09-21 DIAGNOSIS — Z00.00 ANNUAL PHYSICAL EXAM: Primary | ICD-10-CM

## 2021-09-21 DIAGNOSIS — E11.9 TYPE 2 DIABETES MELLITUS WITHOUT COMPLICATION, WITHOUT LONG-TERM CURRENT USE OF INSULIN (HCC): ICD-10-CM

## 2021-09-21 DIAGNOSIS — F33.41 RECURRENT MAJOR DEPRESSIVE DISORDER, IN PARTIAL REMISSION (HCC): ICD-10-CM

## 2021-09-21 DIAGNOSIS — E78.2 MIXED HYPERLIPIDEMIA: ICD-10-CM

## 2021-09-21 PROCEDURE — 99396 PREV VISIT EST AGE 40-64: CPT | Performed by: FAMILY MEDICINE

## 2021-09-21 PROCEDURE — 3077F SYST BP >= 140 MM HG: CPT | Performed by: FAMILY MEDICINE

## 2021-09-21 PROCEDURE — 3008F BODY MASS INDEX DOCD: CPT | Performed by: FAMILY MEDICINE

## 2021-09-21 PROCEDURE — 3080F DIAST BP >= 90 MM HG: CPT | Performed by: FAMILY MEDICINE

## 2021-09-21 NOTE — PATIENT INSTRUCTIONS

## 2021-09-21 NOTE — PROGRESS NOTES
[address]    NAME: Brittney Stark  AGE: 40 y o  SEX: male  : 1975     DATE: 2021     Assessment and Plan:     Problem List Items Addressed This Visit        Endocrine    Type 2 diabetes mellitus without complication, without long-term current use of insulin ([facility] ) (Chronic)    Relevant Orders    Ambulatory referral to Diabetic Education    Ambulatory referral to Endocrinology       Other    Mixed hyperlipidemia    Recurrent major depressive disorder, in partial remission ([facility] )      Other Visit Diagnoses     Annual physical exam    -  Primary    Zara Bauman is stable on exam   He is to continue a lower carb/salt diet, and get regular exercise  BMI 28 0-28 9,adult        Diet and exercise as above  Immunizations and preventive care screenings were discussed with patient today  Appropriate education was printed on patient's after visit summary  Counseling:  Dental Health: discussed importance of regular tooth brushing, flossing, and dental visits  · Exercise: the importance of regular exercise/physical activity was discussed  Recommend exercise 3-5 times per week for at least 30 minutes  BMI Counseling: Body mass index is 28 15 kg/m²  The BMI is above normal  Nutrition recommendations include moderation in carbohydrate intake  Exercise recommendations include exercising 3-5 times per week  No pharmacotherapy was ordered  Patient referred to nutritionist and PCP  Rationale for BMI follow-up plan is due to patient being overweight or obese  Tobacco Cessation Counseling: The patient is sincerely urged to quit consumption of tobacco  He is ready to quit tobacco  Pt has largely stopped smoking  Only has an occasional cigarette now when out with friends  Return in 6 months (on 3/21/2022) for Recheck       Chief Complaint:     Chief Complaint   Patient presents with    Physical Exam     patient being seen for physical       History of Present Illness:     Adult Annual Physical   Patient here for a comprehensive physical exam  The patient reports no problems  Recent labs discussed - LDL 92, HDL 54, Trig 302, A1c 9 1%  Pt just completed the COV-19 [employer] series) 9/3/21; he declines the Flu Vaccine today  Sees Podiatry  We discussed him checking with [facility] whether or not they will cover colonoscopy now  Seeing Podiatry  He has cut back on his soda and candy intake  Eye exam is UTD  Diet and Physical Activity  · Diet/Nutrition: limited junk food  · Exercise: walking  Depression Screening  PHQ-9 Depression Screening    PHQ-9:   Frequency of the following problems over the past two weeks:           General Health  · Sleep: sleeps well  · Hearing: normal - bilateral   · Vision: no vision problems  · Dental: no dental visits for >1 year  We discussed   Health  · Symptoms include: none     Review of Systems:     Review of Systems   Constitutional: Negative for activity change  Respiratory: Negative for shortness of breath  Cardiovascular: Negative for chest pain  Gastrointestinal: Negative for abdominal pain and blood in stool  Genitourinary: Negative for decreased urine volume and difficulty urinating  Psychiatric/Behavioral: Negative for dysphoric mood  The patient is not nervous/anxious         Past Medical History:     Past Medical History:   Diagnosis Date    Diabetes mellitus ([address] )     Hyperlipidemia       Past Surgical History:     Past Surgical History:   Procedure Laterality Date    HERNIA REPAIR      as baby    MASS EXCISION Left 1/14/2020    Procedure: left hand mass excision;  Surgeon: Glendale Apley, MD;  Location: [facility];  Service: Orthopedics      Family History:     Family History   Problem Relation Age of Onset    Hypertension Mother     Diabetes Father       Social History:     Social History     Socioeconomic History    Marital status: Single Spouse name: None    Number of children: None    Years of education: None    Highest education level: None   Occupational History    None   Tobacco Use    Smoking status: Current Every Day Smoker     Packs/day: 0 50     Types: Cigarettes    Smokeless tobacco: Never Used   Vaping Use    Vaping Use: Never used   Substance and Sexual Activity    Alcohol use: Yes     Comment: Social    Drug use: No    Sexual activity: Yes     Partners: Female   Other Topics Concern    None   Social History Narrative    None     Social Determinants of Health     Financial Resource Strain:     Difficulty of Paying Living Expenses:    Food Insecurity:     Worried About Running Out of Food in the Last Year:     Ran Out of Food in the Last Year:    Transportation Needs:     Lack of Transportation (Medical):      Lack of Transportation (Non-Medical):    Physical Activity:     Days of Exercise per Week:     Minutes of Exercise per Session:    Stress:     Feeling of Stress :    Social Connections:     Frequency of Communication with Friends and Family:     Frequency of Social Gatherings with Friends and Family:     Attends Mu-ism Services:     Active Member of Clubs or Organizations:     Attends Club or Organization Meetings:     Marital Status:    Intimate Partner Violence:     Fear of Current or Ex-Partner:     Emotionally Abused:     Physically Abused:     Sexually Abused:       Current Medications:     Current Outpatient Medications   Medication Sig Dispense Refill    atorvastatin (LIPITOR) 20 mg tablet Take 1 tablet (20 mg total) by mouth daily 90 tablet 3    Empagliflozin 25 MG TABS Take 1 tablet (25 mg total) by mouth every morning 30 tablet 5    escitalopram (LEXAPRO) 10 mg tablet Take 1 tablet by mouth once daily 90 tablet 0    gabapentin (NEURONTIN) 300 mg capsule Take 1 capsule (300 mg total) by mouth daily at bedtime 30 capsule 5    glipiZIDE (GLUCOTROL XL) 10 mg 24 hr tablet Take 1 tablet (10 mg total) by mouth daily 30 tablet 5    glipiZIDE (GLUCOTROL XL) 5 mg 24 hr tablet Take 1 tablet (5 mg total) by mouth daily 30 tablet 5    Januvia 100 MG tablet Take 1 tablet by mouth once daily 30 tablet 5    lisinopril (ZESTRIL) 10 mg tablet Take 1 tablet (10 mg total) by mouth daily 30 tablet 5    metFORMIN (GLUCOPHAGE) 1000 MG tablet Take 1 tablet (1,000 mg total) by mouth 2 (two) times a day with meals 60 tablet 5    sildenafil (VIAGRA) 50 MG tablet Take 1 tablet (50 mg total) by mouth daily as needed for erectile dysfunction 10 tablet 0     No current facility-administered medications for this visit  Allergies:     No Known Allergies   Physical Exam:     /90 (BP Location: Left arm, Patient Position: Sitting, Cuff Size: Standard)   Pulse 102   Temp 98 1 °F (36 7 °C) (Tympanic)   Resp 14   Ht 5' 9 61" (1 768 m)   Wt 88 kg (194 lb)   SpO2 97%   BMI 28 15 kg/m²     Physical Exam  Vitals and nursing note reviewed  Constitutional:       General: He is not in acute distress  Appearance: Normal appearance  He is not ill-appearing, toxic-appearing or diaphoretic  HENT:      Head: Normocephalic and atraumatic  Eyes:      General: No scleral icterus  Conjunctiva/sclera: Conjunctivae normal    Cardiovascular:      Rate and Rhythm: Normal rate and regular rhythm  Heart sounds: Normal heart sounds  No murmur heard  No friction rub  No gallop  Pulmonary:      Effort: Pulmonary effort is normal  No respiratory distress  Breath sounds: Normal breath sounds  No stridor  No wheezing, rhonchi or rales  Abdominal:      General: Abdomen is flat  Bowel sounds are normal  There is no distension  Palpations: Abdomen is soft  There is no mass  Tenderness: There is no abdominal tenderness  There is no guarding or rebound  Musculoskeletal:      Cervical back: Normal range of motion and neck supple  No rigidity or tenderness     Lymphadenopathy:      Cervical: No cervical adenopathy  Neurological:      Mental Status: He is alert and oriented to person, place, and time  Psychiatric:         Mood and Affect: Mood normal          Behavior: Behavior normal          Thought Content: Thought content normal          Judgment: Judgment normal       Wali Aguiar was seen today for physical exam     Diagnoses and all orders for this visit:    Annual physical exam  Comments:  Eual Diss is stable on exam   He is to continue a lower carb/salt diet, and get regular exercise  BMI 28 0-28 9,adult  Comments:  Diet and exercise as above  Type 2 diabetes mellitus without complication, without long-term current use of insulin ([facility])  Comments:  A1c elevated, despite maximal oral therapy - referring pt to Diabetic Teaching at this time, as well as Endocrinology  Orders:  -     Ambulatory referral to Diabetic Education; Future  -     Ambulatory referral to Endocrinology; Future    Mixed hyperlipidemia  Comments:  LDL controlled with Atorvastatin  Triglycerides up, likely assoc with uncontrolled NIDDM      Recurrent major depressive disorder, in partial remission ([facility] )  Comments:  Condition controlled with Escitalopram           [address] Mónica Arriaga, DO  [address] [facility]

## 2021-10-19 ENCOUNTER — TELEPHONE (OUTPATIENT)
Dept: FAMILY MEDICINE CLINIC | Facility: CLINIC | Age: 46
End: 2021-10-19

## 2021-10-20 PROCEDURE — U0003 INFECTIOUS AGENT DETECTION BY NUCLEIC ACID (DNA OR RNA); SEVERE ACUTE RESPIRATORY SYNDROME CORONAVIRUS 2 (SARS-COV-2) (CORONAVIRUS DISEASE [COVID-19]), AMPLIFIED PROBE TECHNIQUE, MAKING USE OF HIGH THROUGHPUT TECHNOLOGIES AS DESCRIBED BY CMS-2020-01-R: HCPCS | Performed by: FAMILY MEDICINE

## 2021-10-20 PROCEDURE — U0005 INFEC AGEN DETEC AMPLI PROBE: HCPCS | Performed by: FAMILY MEDICINE

## 2021-11-23 ENCOUNTER — CONSULT (OUTPATIENT)
Dept: ENDOCRINOLOGY | Facility: CLINIC | Age: 46
End: 2021-11-23
Payer: COMMERCIAL

## 2021-11-23 VITALS
SYSTOLIC BLOOD PRESSURE: 120 MMHG | BODY MASS INDEX: 29.18 KG/M2 | HEART RATE: 96 BPM | WEIGHT: 197 LBS | HEIGHT: 69 IN | DIASTOLIC BLOOD PRESSURE: 80 MMHG

## 2021-11-23 DIAGNOSIS — E11.9 TYPE 2 DIABETES MELLITUS WITHOUT COMPLICATION, WITHOUT LONG-TERM CURRENT USE OF INSULIN (HCC): ICD-10-CM

## 2021-11-23 DIAGNOSIS — E11.65 TYPE 2 DIABETES MELLITUS WITH HYPERGLYCEMIA, WITHOUT LONG-TERM CURRENT USE OF INSULIN (HCC): ICD-10-CM

## 2021-11-23 PROCEDURE — 99243 OFF/OP CNSLTJ NEW/EST LOW 30: CPT | Performed by: INTERNAL MEDICINE

## 2021-11-23 PROCEDURE — 3079F DIAST BP 80-89 MM HG: CPT | Performed by: INTERNAL MEDICINE

## 2021-11-23 PROCEDURE — 3074F SYST BP LT 130 MM HG: CPT | Performed by: INTERNAL MEDICINE

## 2021-11-23 PROCEDURE — 3008F BODY MASS INDEX DOCD: CPT | Performed by: INTERNAL MEDICINE

## 2021-11-23 RX ORDER — BLOOD-GLUCOSE METER
EACH MISCELLANEOUS
Qty: 1 KIT | Refills: 1 | Status: SHIPPED | OUTPATIENT
Start: 2021-11-23

## 2021-11-23 RX ORDER — BLOOD SUGAR DIAGNOSTIC
STRIP MISCELLANEOUS
Qty: 50 STRIP | Refills: 4 | Status: SHIPPED | OUTPATIENT
Start: 2021-11-23

## 2021-11-23 RX ORDER — LANCETS 33 GAUGE
EACH MISCELLANEOUS
Qty: 100 EACH | Refills: 3 | Status: SHIPPED | OUTPATIENT
Start: 2021-11-23

## 2021-12-10 ENCOUNTER — VBI (OUTPATIENT)
Dept: ADMINISTRATIVE | Facility: OTHER | Age: 46
End: 2021-12-10

## 2021-12-28 DIAGNOSIS — E78.2 MIXED HYPERLIPIDEMIA: ICD-10-CM

## 2021-12-28 DIAGNOSIS — E11.9 TYPE 2 DIABETES MELLITUS WITHOUT COMPLICATION, WITHOUT LONG-TERM CURRENT USE OF INSULIN (HCC): Chronic | ICD-10-CM

## 2021-12-28 RX ORDER — ATORVASTATIN CALCIUM 20 MG/1
TABLET, FILM COATED ORAL
Qty: 90 TABLET | Refills: 0 | Status: SHIPPED | OUTPATIENT
Start: 2021-12-28 | End: 2022-03-14

## 2021-12-28 RX ORDER — EMPAGLIFLOZIN 25 MG/1
TABLET, FILM COATED ORAL
Qty: 30 TABLET | Refills: 0 | Status: SHIPPED | OUTPATIENT
Start: 2021-12-28

## 2022-01-27 ENCOUNTER — APPOINTMENT (EMERGENCY)
Dept: RADIOLOGY | Facility: HOSPITAL | Age: 47
End: 2022-01-27
Payer: COMMERCIAL

## 2022-01-27 ENCOUNTER — HOSPITAL ENCOUNTER (EMERGENCY)
Facility: HOSPITAL | Age: 47
Discharge: HOME | End: 2022-01-27
Attending: EMERGENCY MEDICINE | Admitting: EMERGENCY MEDICINE
Payer: COMMERCIAL

## 2022-01-27 VITALS
HEIGHT: 69 IN | TEMPERATURE: 98.1 F | RESPIRATION RATE: 18 BRPM | WEIGHT: 180 LBS | HEART RATE: 92 BPM | OXYGEN SATURATION: 98 % | BODY MASS INDEX: 26.66 KG/M2 | DIASTOLIC BLOOD PRESSURE: 76 MMHG | SYSTOLIC BLOOD PRESSURE: 125 MMHG

## 2022-01-27 DIAGNOSIS — M25.512 LEFT SHOULDER PAIN: Primary | ICD-10-CM

## 2022-01-27 PROCEDURE — 73030 X-RAY EXAM OF SHOULDER: CPT

## 2022-01-27 PROCEDURE — 96372 THER/PROPH/DIAG INJ SC/IM: CPT

## 2022-01-27 PROCEDURE — 99283 EMERGENCY DEPT VISIT LOW MDM: CPT

## 2022-01-27 PROCEDURE — 99284 EMERGENCY DEPT VISIT MOD MDM: CPT | Performed by: EMERGENCY MEDICINE

## 2022-01-27 RX ORDER — KETOROLAC TROMETHAMINE 30 MG/ML
15 INJECTION, SOLUTION INTRAMUSCULAR; INTRAVENOUS ONCE
Status: COMPLETED | OUTPATIENT
Start: 2022-01-27 | End: 2022-01-27

## 2022-01-27 RX ORDER — LIDOCAINE 50 MG/G
1 PATCH TOPICAL ONCE
Status: DISCONTINUED | OUTPATIENT
Start: 2022-01-27 | End: 2022-01-27 | Stop reason: HOSPADM

## 2022-01-27 RX ADMIN — LIDOCAINE 5% 1 PATCH: 700 PATCH TOPICAL at 06:12

## 2022-01-27 RX ADMIN — KETOROLAC TROMETHAMINE 15 MG: 30 INJECTION, SOLUTION INTRAMUSCULAR at 06:12

## 2022-01-27 NOTE — DISCHARGE INSTRUCTIONS
You have been seen for shoulder pain  You should return to the ED if you develop numbness/tingling in the arm, weakness, or other worsening symptoms  Follow up with orthopedics  Alternate Tylenol and Advil every  4 hours  Use lidoderm patches as needed   Rest 
pt arrives to ED complaining of chest pain radiating to her left arm. pt was seen today by Md Rodas and had echo done. results pending. pt had lab work drawn at PCP but no troponin was drawn. pt was told to come to ER. pt with family history of MI. stress test scheduled in upcoming weeks. alert and oriented x 4.

## 2022-01-27 NOTE — Clinical Note
Yoselin Casey was seen and treated in our emergency department on 1/27/2022  Diagnosis:     Cait Cortez  may return to work on return date  He may return on this date: 01/31/2022         If you have any questions or concerns, please don't hesitate to call        Kristofer Choi DO    ______________________________           _______________          _______________  Hospital Representative                              Date                                Time

## 2022-01-27 NOTE — ED PROVIDER NOTES
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 Taking?    Blood Glucose Monitoring Suppl (OneTouch Verio) w/Device KIT   No No   Sig: Use to test 1-2x per day   Januvia 100 MG tablet  Self No No   Sig: Take 1 tablet by mouth once daily   Jardiance 25 MG TABS   No No   Sig: TAKE 1 TABLET BY MOUTH ONCE DAILY IN THE MORNING   OneTouch Delica Lancets 13Y MISC   No No   Sig: Use to test 1-2x daily   atorvastatin (LIPITOR) 20 mg tablet   No No   Sig: Take 1 tablet by mouth once daily   escitalopram (LEXAPRO) 10 mg tablet  Self No No   Sig: Take 1 tablet by mouth once daily   gabapentin (NEURONTIN) 300 mg capsule  Self No No   Sig: Take 1 capsule (300 mg total) by mouth daily at bedtime   glipiZIDE (GLUCOTROL XL) 10 mg 24 hr tablet  Self No No   Sig: Take 1 tablet (10 mg total) by mouth daily   glucose blood (OneTouch Verio) test strip   No No   Sig: Use to test 1-2x daily   lisinopril (ZESTRIL) 10 mg tablet  Self No No   Sig: Take 1 tablet (10 mg total) by mouth daily   metFORMIN (GLUCOPHAGE) 1000 MG tablet  Self No No   Sig: Take 1 tablet (1,000 mg total) by mouth 2 (two) times a day with meals   sildenafil (VIAGRA) 50 MG tablet  Self No No   Sig: Take 1 tablet (50 mg total) by mouth daily as needed for erectile dysfunction      Facility-Administered Medications: None       Past Medical History:   Diagnosis Date    Diabetes mellitus ([facility] [address] )     Hyperlipidemia        Past Surgical History:   Procedure Laterality Date    HERNIA REPAIR      as baby    MASS EXCISION Left 1/14/2020    Procedure: left hand mass excision;  Surgeon: Toro Vazquez MD;  [facility];  Service: Orthopedics       Family History   Problem Relation Age of Onset    Hypertension Mother     Diabetes Father      I have reviewed and agree with the history as documented  E-Cigarette/Vaping    E-Cigarette Use Never User      E-Cigarette/Vaping Substances    Nicotine No     THC No     CBD No     Flavoring No     Other No     Unknown No      Social History     Tobacco Use    Smoking status: Current Every Day Smoker     Packs/day: 0 25     Types: Cigarettes    Smokeless tobacco: Never Used    Tobacco comment: 1 cigarrette    Vaping Use    Vaping Use: Never used   Substance Use Topics    Alcohol use: Yes     Comment: Social    Drug use: No        Review of Systems   Constitutional: Negative for appetite change, chills, fatigue and fever  HENT: Negative  Eyes: Negative  Respiratory: Negative for cough, chest tightness and shortness of breath  Cardiovascular: Negative for chest pain and palpitations  Gastrointestinal: Negative for abdominal pain, diarrhea, nausea and vomiting  Endocrine: Negative  Genitourinary: Negative for difficulty urinating and hematuria  Musculoskeletal: Positive for arthralgias  Negative for myalgias  Skin: Negative for pallor and rash  Allergic/Immunologic: Negative  Neurological: Negative for dizziness, weakness, light-headedness and headaches  Hematological: Negative          Physical Exam  ED Triage Vitals [01/27/22 0541]   Temperature Pulse Respirations Blood Pressure SpO2   98 1 °F (36 7 °C) 98 18 (!) 173/106 96 %      Temp Source Heart Rate Source Patient Position - Orthostatic VS BP Location FiO2 (%)   Oral Monitor Lying Right arm --      Pain Score       9             Orthostatic Vital Signs  Vitals:    01/27/22 0541 01/27/22 0600   BP: (!) 173/106 125/76   Pulse: 98 92   Patient Position - Orthostatic VS: Lying Lying       Physical Exam  Vitals and nursing note reviewed  Constitutional:       General: He is not in acute distress  Appearance: Normal appearance  He is not ill-appearing  HENT:      Head: Normocephalic and atraumatic  Eyes:      Conjunctiva/sclera: Conjunctivae normal    Cardiovascular:      Rate and Rhythm: Normal rate and regular rhythm  Pulmonary:      Effort: Pulmonary effort is normal    Musculoskeletal:      Cervical back: Normal range of motion and neck supple  Comments: Normal range of motion of the left and right shoulder  Patient has pain with range of motion of the left shoulder  Pain to palpation of the anterior left shoulder where the biceps tendon attaches  Negative drop-arm test bilaterally  Positive speed's test on the left  No pain with internal or external rotation of the shoulder  Skin:     General: Skin is warm and dry  Neurological:      General: No focal deficit present  Mental Status: He is alert and oriented to person, place, and time  Motor: No weakness           ED Medications  Medications   lidocaine (LIDODERM) 5 % patch 1 patch (1 patch Topical Medication Applied 1/27/22 0612)   ketorolac (TORADOL) injection 15 mg (15 mg Intramuscular Given 1/27/22 0612)       Diagnostic Studies  Results Reviewed     None                 XR shoulder 2+ views LEFT   ED Interpretation by Bettye Chadwick DO (01/27 0710)   No acute osseous process            Procedures  Procedures      ED Course                                       MDM  Number of Diagnoses or Management Options  Left shoulder pain: established and improving  Diagnosis management comments: 78-year-old male presents with atraumatic shoulder pain  Patient's pain seems indicative of a tendinitis or other musculoskeletal pathology  Will check x-ray  Will give Toradol and Lidoderm patch and re-evaluate  Amount and/or Complexity of Data Reviewed  Tests in the radiology section of CPT®: ordered and reviewed  Review and summarize past medical records: yes  Discuss the patient with other providers: yes    Risk of Complications, Morbidity, and/or Mortality  Presenting problems: low  Diagnostic procedures: low  Management options: low    Patient Progress  Patient progress: improved    Patient felt better after receiving medications here  Continue taking NSAIDs in using Lidoderm patches if needed  Follow-up with orthopedics  Return precautions given  Disposition  Final diagnoses:   Left shoulder pain     Time reflects when diagnosis was documented in both MDM as applicable and the Disposition within this note     Time User Action Codes Description Comment    1/27/2022  7:10 AM Damian Schneider Add [M25 512] Left shoulder pain       ED Disposition     ED Disposition Condition Date/Time Comment    Discharge Good Thu Jan 27, 2022  7:10 AM Jaja Both discharge to home/self care  Follow-up Information     Follow up With Specialties Details Why Contact Info Additional [phone] [facility]   [address] [phone] [address], [address]  [facility]          Patient's Medications   Discharge Prescriptions    No medications on file     No discharge procedures on file  PDMP Review       Value Time User    PDMP Reviewed  Yes 1/14/2020 12:17 PM Lashonda Mir MD           ED Provider  Attending physically available and evaluated Jaja Both   I managed the patient along with the ED Attending      Electronically Signed by         Kenya Benson DO  01/27/22 0691

## 2022-01-27 NOTE — ED ATTENDING ATTESTATION
1/27/2022  IRadha MD, saw and evaluated the patient  I have discussed the patient with the resident/non-physician practitioner and agree with the resident's/non-physician practitioner's findings, Plan of Care, and MDM as documented in the resident's/non-physician practitioner's note, except where noted  All available labs and Radiology studies were reviewed  I was present for key portions of any procedure(s) performed by the resident/non-physician practitioner and I was immediately available to provide assistance  At this point I agree with the current assessment done in the Emergency Department  I have conducted an independent evaluation of this patient a history and physical is as follows:    ED Course     Emergency Department Note- Kortney Cain 40 y o  male MRN: [id number]    Unit/Bed#: ED 25 Encounter: [id number]    Kortney Cain is a 40 y o  male who presents with   Chief Complaint   Patient presents with    Shoulder Pain     Pt states L shoulder pain X3-4 days that radiates down to his hand  States he is   History of Present Illness   HPI:  Kortney Cain is a 40 y o  male who presents for evaluation of:  4 days of anterior L shoulder pain  Pain is moderate; intermittent- currently feeling better; abrupt exacerbations particularly at work; feels like a muscular ache  Patient took ibuprofen with some relief at home  Patient denies shoulder swelling  Review of Systems   Constitutional: Negative for chills and fever  HENT: Negative for congestion and rhinorrhea  Respiratory: Negative for cough and shortness of breath  Cardiovascular: Negative for chest pain and palpitations  Gastrointestinal: Negative for nausea and vomiting  Musculoskeletal: Negative for back pain and neck pain  All other systems reviewed and are negative        Historical Information   Past Medical History:   Diagnosis Date    Diabetes mellitus ([facility] [address] )     Hyperlipidemia      Past Surgical History:   Procedure Laterality Date    HERNIA REPAIR      as baby    MASS EXCISION Left 1/14/2020    Procedure: left hand mass excision;  Surgeon: Jannie Brooks MD;  [facility];  Service: Orthopedics     Social History   Social History     Substance and Sexual Activity   Alcohol Use Yes    Comment: Social     Social History     Substance and Sexual Activity   Drug Use No     Social History     Tobacco Use   Smoking Status Current Every Day Smoker    Packs/day: 0 25    Types: Cigarettes   Smokeless Tobacco Never Used   Tobacco Comment    1 cigarrette      Family History:   Family History   Problem Relation Age of Onset    Hypertension Mother     Diabetes Father        Meds/Allergies   PTA meds:   Prior to Admission Medications   Prescriptions Last Dose Informant Patient Reported? Taking?    Blood Glucose Monitoring Suppl (OneTouch Verio) w/Device KIT   No No   Sig: Use to test 1-2x per day   Januvia 100 MG tablet  Self No No   Sig: Take 1 tablet by mouth once daily   Jardiance 25 MG TABS   No No   Sig: TAKE 1 TABLET BY MOUTH ONCE DAILY IN THE MORNING   OneTouch Delica Lancets 06I MISC   No No   Sig: Use to test 1-2x daily   atorvastatin (LIPITOR) 20 mg tablet   No No   Sig: Take 1 tablet by mouth once daily   escitalopram (LEXAPRO) 10 mg tablet  Self No No   Sig: Take 1 tablet by mouth once daily   gabapentin (NEURONTIN) 300 mg capsule  Self No No   Sig: Take 1 capsule (300 mg total) by mouth daily at bedtime   glipiZIDE (GLUCOTROL XL) 10 mg 24 hr tablet  Self No No   Sig: Take 1 tablet (10 mg total) by mouth daily   glucose blood (OneTouch Verio) test strip   No No   Sig: Use to test 1-2x daily   lisinopril (ZESTRIL) 10 mg tablet  Self No No   Sig: Take 1 tablet (10 mg total) by mouth daily   metFORMIN (GLUCOPHAGE) 1000 MG tablet  Self No No   Sig: Take 1 tablet (1,000 mg total) by mouth 2 (two) times a day with meals   sildenafil (VIAGRA) 50 MG tablet  Self No No   Sig: Take 1 tablet (50 mg total) by mouth daily as needed for erectile dysfunction      Facility-Administered Medications: None     No Known Allergies    Objective   First Vitals:   Blood Pressure: (!) 173/106 (01/27/22 0541)  Pulse: 98 (01/27/22 0541)  Temperature: 98 1 °F (36 7 °C) (01/27/22 0541)  Temp Source: Oral (01/27/22 0541)  Respirations: 18 (01/27/22 0541)  Height: 5' 9" (175 3 cm) (01/27/22 0541)  Weight - Scale: 81 6 kg (180 lb) (01/27/22 0541)  SpO2: 96 % (01/27/22 0541)    Current Vitals:   Blood Pressure: (!) 173/106 (01/27/22 0541)  Pulse: 98 (01/27/22 0541)  Temperature: 98 1 °F (36 7 °C) (01/27/22 0541)  Temp Source: Oral (01/27/22 0541)  Respirations: 18 (01/27/22 0541)  Height: 5' 9" (175 3 cm) (01/27/22 0541)  Weight - Scale: 81 6 kg (180 lb) (01/27/22 0541)  SpO2: 99 % (01/27/22 0543)    No intake or output data in the 24 hours ending 01/27/22 0601    Invasive Devices  Report    None                 Physical Exam  Vitals and nursing note reviewed  Constitutional:       General: He is not in acute distress  Appearance: Normal appearance  He is well-developed  HENT:      Head: Normocephalic and atraumatic  Right Ear: External ear normal       Left Ear: External ear normal       Nose: Nose normal       Mouth/Throat:      Pharynx: No oropharyngeal exudate  Eyes:      Conjunctiva/sclera: Conjunctivae normal       Pupils: Pupils are equal, round, and reactive to light  Cardiovascular:      Rate and Rhythm: Normal rate and regular rhythm  Pulmonary:      Effort: Pulmonary effort is normal  No respiratory distress  Abdominal:      General: Abdomen is flat  There is no distension  Musculoskeletal:         General: No deformity  Normal range of motion  Cervical back: Normal range of motion and neck supple  Skin:     General: Skin is warm and dry  Capillary Refill: Capillary refill takes less than 2 seconds  Neurological:      General: No focal deficit present        Mental Status: He is alert and oriented to person, place, and time  Mental status is at baseline  Coordination: Coordination normal    Psychiatric:         Mood and Affect: Mood normal          Behavior: Behavior normal          Thought Content: Thought content normal          Judgment: Judgment normal            Medical Decision Makin  Acute tendonitis L shoulder: NSAIDS; heating pad    No results found for this or any previous visit (from the past 36 hour(s))  XR shoulder 2+ views LEFT    (Results Pending)         Portions of the record may have been created with voice recognition software  Occasional wrong word or "sound a like" substitutions may have occurred due to the inherent limitations of voice recognition software  Read the chart carefully and recognize, using context, where substitutions have occurred          Critical Care Time  Procedures

## 2022-03-02 ENCOUNTER — TELEPHONE (OUTPATIENT)
Dept: FAMILY MEDICINE CLINIC | Facility: CLINIC | Age: 47
End: 2022-03-02

## 2022-03-02 NOTE — TELEPHONE ENCOUNTER
LM for patient to call office  Jardiance prior Katie Bryant was denied  Is patient paying out of pocket for med? May need to contact Endocrinology for medication change

## 2022-03-14 DIAGNOSIS — F33.41 RECURRENT MAJOR DEPRESSIVE DISORDER, IN PARTIAL REMISSION (HCC): ICD-10-CM

## 2022-03-14 DIAGNOSIS — E11.9 TYPE 2 DIABETES MELLITUS WITHOUT COMPLICATION, WITHOUT LONG-TERM CURRENT USE OF INSULIN (HCC): Chronic | ICD-10-CM

## 2022-03-14 DIAGNOSIS — E78.2 MIXED HYPERLIPIDEMIA: ICD-10-CM

## 2022-03-14 RX ORDER — ESCITALOPRAM OXALATE 10 MG/1
TABLET ORAL
Qty: 90 TABLET | Refills: 0 | Status: SHIPPED | OUTPATIENT
Start: 2022-03-14

## 2022-03-14 RX ORDER — ATORVASTATIN CALCIUM 20 MG/1
TABLET, FILM COATED ORAL
Qty: 90 TABLET | Refills: 0 | Status: SHIPPED | OUTPATIENT
Start: 2022-03-14

## 2022-03-16 ENCOUNTER — RA CDI HCC (OUTPATIENT)
Dept: OTHER | Facility: HOSPITAL | Age: 47
End: 2022-03-16

## 2022-03-16 ENCOUNTER — TELEPHONE (OUTPATIENT)
Dept: FAMILY MEDICINE CLINIC | Facility: CLINIC | Age: 47
End: 2022-03-16

## 2022-03-16 NOTE — PROGRESS NOTES
[facility]  coding opportunities       Chart reviewed, no opportunity found: CHART REVIEWED, NO OPPORTUNITY FOUND        Patients Insurance        Commercial Insurance: [address] [place] [address]

## 2022-03-16 NOTE — TELEPHONE ENCOUNTER
Spoke with pharmacy staff to inform them the prior auth for Usman Shelby was approved so they could push the medication through, while on the phone they tried to push through the [address] [facility] [address] as well and it went through  Patient made aware medications were approved and pharmacy will be filling them

## 2022-03-22 ENCOUNTER — OFFICE VISIT (OUTPATIENT)
Dept: FAMILY MEDICINE CLINIC | Facility: CLINIC | Age: 47
End: 2022-03-22
Payer: COMMERCIAL

## 2022-03-22 VITALS
BODY MASS INDEX: 28.02 KG/M2 | TEMPERATURE: 97 F | HEIGHT: 69 IN | HEART RATE: 98 BPM | OXYGEN SATURATION: 98 % | RESPIRATION RATE: 12 BRPM | WEIGHT: 189.2 LBS | SYSTOLIC BLOOD PRESSURE: 118 MMHG | DIASTOLIC BLOOD PRESSURE: 80 MMHG

## 2022-03-22 DIAGNOSIS — Z11.59 ENCOUNTER FOR HEPATITIS C SCREENING TEST FOR LOW RISK PATIENT: ICD-10-CM

## 2022-03-22 DIAGNOSIS — I10 PRIMARY HYPERTENSION: ICD-10-CM

## 2022-03-22 DIAGNOSIS — E78.2 MIXED HYPERLIPIDEMIA: ICD-10-CM

## 2022-03-22 DIAGNOSIS — E11.65 TYPE 2 DIABETES MELLITUS WITH HYPERGLYCEMIA, WITHOUT LONG-TERM CURRENT USE OF INSULIN (HCC): Primary | ICD-10-CM

## 2022-03-22 PROBLEM — F33.2 SEVERE EPISODE OF RECURRENT MAJOR DEPRESSIVE DISORDER, WITHOUT PSYCHOTIC FEATURES (HCC): Status: ACTIVE | Noted: 2022-03-22

## 2022-03-22 PROCEDURE — 99214 OFFICE O/P EST MOD 30 MIN: CPT | Performed by: FAMILY MEDICINE

## 2022-03-22 PROCEDURE — 3079F DIAST BP 80-89 MM HG: CPT | Performed by: FAMILY MEDICINE

## 2022-03-22 PROCEDURE — 3008F BODY MASS INDEX DOCD: CPT | Performed by: FAMILY MEDICINE

## 2022-03-22 PROCEDURE — 3074F SYST BP LT 130 MM HG: CPT | Performed by: FAMILY MEDICINE

## 2022-03-22 PROCEDURE — 3725F SCREEN DEPRESSION PERFORMED: CPT | Performed by: FAMILY MEDICINE

## 2022-03-22 NOTE — PROGRESS NOTES
FAMILY PRACTICE OFFICE VISIT       NAME: Isela Logan  AGE: 40 y o  SEX: male       : 1975        MRN: [id number]    DATE: 3/22/2022  TIME: 5:17 PM    Assessment and Plan   1  Type 2 diabetes mellitus with hyperglycemia, without long-term current use of insulin (HCC)  Comments:  Rodney Gray overall appears well  He is to continue a lower carb/salt diet, regular exercise, present meds, and f/u with Endocrinology  FBW with A1c ordered  Orders:  -     Hemoglobin A1C ([facility]); Future  -     Comprehensive metabolic panel; Future    2  Mixed hyperlipidemia  Comments:  Stable; on Atorvastatin  Orders:  -     Lipid Panel with Direct LDL reflex; Future    3  Primary hypertension  Comments:  Controlled on present management  4  Encounter for hepatitis C screening test for low risk patient  Comments:  One time screen  Orders:  -     Hepatitis C Antibody ([facility]); Future         There are no Patient Instructions on file for this visit  Chief Complaint     Chief Complaint   Patient presents with    Follow-up     patient being seen for 6 month follow up        History of Present Illness   Isela Logan is a 40y o -year-old male who presents in f/u today  Just laid off last week; for now, has benefits thru the end of this month  He did see Endocrinology in consultation - f/u is pending at this time  F/u labs for Endo have not be completed yet  Has not consistently checking his sugars consistently  Really has not been smoking for awhile now  Has been walking more  Review of Systems   Review of Systems   Constitutional: Negative for activity change  Respiratory: Negative for shortness of breath  Cardiovascular: Negative for chest pain  Genitourinary: Negative for dysuria  Musculoskeletal: Negative for myalgias         Active Problem List     Patient Active Problem List   Diagnosis    Type 2 diabetes mellitus with hyperglycemia, without long-term current use of insulin ([facility] )    Mixed hyperlipidemia    Recurrent major depressive disorder, in partial remission ([facility] )    Mass of hand, left    Mass of left hand    Right leg paresthesias    Severe episode of recurrent major depressive disorder, without psychotic features ([facility] )         Past Medical History:  Past Medical History:   Diagnosis Date    Diabetes mellitus ([facility] )     Hyperlipidemia        Past Surgical History:  Past Surgical History:   Procedure Laterality Date    HERNIA REPAIR      as baby    MASS EXCISION Left 1/14/2020    Procedure: left hand mass excision;  Surgeon: Leopold Mitts, MD;  Location: [facility] OR;  Service: Orthopedics       Family History:  Family History   Problem Relation Age of Onset    Hypertension Mother     Diabetes Father        Social History:  Social History     Socioeconomic History    Marital status: Single     Spouse name: Not on file    Number of children: Not on file    Years of education: Not on file    Highest education level: Not on file   Occupational History    Not on file   Tobacco Use    Smoking status: Current Every Day Smoker     Packs/day: 0 25     Types: Cigarettes    Smokeless tobacco: Never Used    Tobacco comment: 1 cigarrette    Vaping Use    Vaping Use: Never used   Substance and Sexual Activity    Alcohol use: Yes     Comment: Social    Drug use: No    Sexual activity: Yes     Partners: Female   Other Topics Concern    Not on file   Social History Narrative    Not on file     Social Determinants of Health     Financial Resource Strain: Not on file   Food Insecurity: Not on file   Transportation Needs: Not on file   Physical Activity: Not on file   Stress: Not on file   Social Connections: Not on file   Intimate Partner Violence: Not on file   Housing Stability: Not on file       Objective     Vitals:    03/22/22 1509   BP: 118/80   Pulse: 98   Resp: 12   Temp: (!) 97 °F (36 1 °C)   SpO2: 98%     Wt Readings from Last 3 Encounters:   03/22/22 85 8 kg (189 lb 3 2 oz)   01/27/22 81 6 kg (180 lb)   11/23/21 89 4 kg (197 lb)       Physical Exam  Vitals and nursing note reviewed  Constitutional:       General: He is not in acute distress  Appearance: Normal appearance  He is not ill-appearing, toxic-appearing or diaphoretic  HENT:      Head: Normocephalic and atraumatic  Eyes:      General: No scleral icterus  Conjunctiva/sclera: Conjunctivae normal    Cardiovascular:      Rate and Rhythm: Normal rate and regular rhythm  Pulses: no weak pulses          Dorsalis pedis pulses are 2+ on the right side and 2+ on the left side  Posterior tibial pulses are 2+ on the right side and 2+ on the left side  Heart sounds: Normal heart sounds  No murmur heard  No friction rub  No gallop  Pulmonary:      Effort: Pulmonary effort is normal  No respiratory distress  Breath sounds: Normal breath sounds  No stridor  No wheezing, rhonchi or rales  Musculoskeletal:      Cervical back: Normal range of motion and neck supple  No rigidity or tenderness  Feet:      Right foot:      Skin integrity: No ulcer, skin breakdown, erythema, warmth, callus or dry skin  Left foot:      Skin integrity: No ulcer, skin breakdown, erythema, warmth, callus or dry skin  Lymphadenopathy:      Cervical: No cervical adenopathy  Neurological:      Mental Status: He is alert and oriented to person, place, and time  Psychiatric:         Mood and Affect: Mood normal          Behavior: Behavior normal          Thought Content: Thought content normal          Judgment: Judgment normal      Patient's shoes and socks removed  Right Foot/Ankle   Right Foot Inspection  Skin Exam: skin normal and skin intact  No dry skin, no warmth, no callus, no erythema, no maceration, no abnormal color, no pre-ulcer, no ulcer and no callus  Toe Exam: ROM and strength within normal limits       Sensory   Monofilament testing: intact    Vascular  Capillary refills: < 3 seconds  The right DP pulse is 2+  The right PT pulse is 2+  Left Foot/Ankle  Left Foot Inspection  Skin Exam: skin normal and skin intact  No dry skin, no warmth, no erythema, no maceration, normal color, no pre-ulcer, no ulcer and no callus  Toe Exam: ROM and strength within normal limits  Sensory   Monofilament testing: intact    Vascular  Capillary refills: < 3 seconds  The left DP pulse is 2+  The left PT pulse is 2+       Assign Risk Category  No deformity present  No loss of protective sensation  No weak pulses  Risk: 0        Pertinent Laboratory/Diagnostic Studies:  Lab Results   Component Value Date    BUN 14 09/20/2021    CREATININE 0 72 09/20/2021    CALCIUM 9 1 09/20/2021    K 4 2 09/20/2021    CO2 23 09/20/2021     09/20/2021     Lab Results   Component Value Date    ALT 45 09/20/2021    AST 16 09/20/2021    ALKPHOS 90 09/20/2021       Lab Results   Component Value Date    WBC 6 57 08/17/2019    HGB 16 0 08/17/2019    HCT 45 2 08/17/2019    MCV 90 08/17/2019     08/17/2019       No results found for: TSH    No results found for: CHOL  Lab Results   Component Value Date    TRIG 302 (H) 09/20/2021     Lab Results   Component Value Date    HDL 54 09/20/2021     Lab Results   Component Value Date    LDLCALC 92 09/20/2021     Lab Results   Component Value Date    HGBA1C 9 1 (H) 09/20/2021       Results for orders placed or performed in visit on 10/20/21   Novel Coronavirus (Covid-19),PCR [facility] or [facility]    Specimen: Nose; Nares   Result Value Ref Range    SARS-CoV-2 Negative Negative       Orders Placed This Encounter   Procedures    Hepatitis C Antibody ([facility])    Hemoglobin A1C ([facility])    Comprehensive metabolic panel    Lipid Panel with Direct LDL reflex       ALLERGIES:  No Known Allergies    Current Medications     Current Outpatient Medications   Medication Sig Dispense Refill    atorvastatin (LIPITOR) 20 mg tablet Take 1 tablet by mouth once daily 90 tablet 0    Blood Glucose Monitoring Suppl (OneTouch Verio) w/Device KIT Use to test 1-2x per day 1 kit 1    escitalopram (LEXAPRO) 10 mg tablet Take 1 tablet by mouth once daily 90 tablet 0    gabapentin (NEURONTIN) 300 mg capsule Take 1 capsule (300 mg total) by mouth daily at bedtime 30 capsule 5    glipiZIDE (GLUCOTROL XL) 10 mg 24 hr tablet Take 1 tablet (10 mg total) by mouth daily 30 tablet 5    glucose blood (OneTouch Verio) test strip Use to test 1-2x daily 50 strip 4    Januvia 100 MG tablet Take 1 tablet by mouth once daily 30 tablet 5    Jardiance 25 MG TABS TAKE 1 TABLET BY MOUTH ONCE DAILY IN THE MORNING 30 tablet 0    lisinopril (ZESTRIL) 10 mg tablet Take 1 tablet (10 mg total) by mouth daily 30 tablet 5    metFORMIN (GLUCOPHAGE) 1000 MG tablet TAKE 1 TABLET BY MOUTH TWICE DAILY WITH MEALS 60 tablet 0    OneTouch Delica Lancets 45C MISC Use to test 1-2x daily 100 each 3    sildenafil (VIAGRA) 50 MG tablet Take 1 tablet (50 mg total) by mouth daily as needed for erectile dysfunction 10 tablet 0     No current facility-administered medications for this visit           Health Maintenance     Health Maintenance   Topic Date Due    Hepatitis C Screening  Never done    Pneumococcal Vaccine: Pediatrics (0 to 5 Years) and At-Risk Patients (6 to 59 Years) (1 of 2 - PPSV23) Never done    DTaP,Tdap,and Td Vaccines (1 - Tdap) Never done    Influenza Vaccine (1) Never done    COVID-19 Vaccine (3 - Booster for Pfizer series) 02/03/2022    HEMOGLOBIN A1C  03/20/2022    HIV Screening  04/14/2023 (Originally 9/18/1990)    Annual Physical  09/21/2022    DM Eye Exam  10/12/2022    BMI: Followup Plan  03/22/2023    BMI: Adult  03/22/2023    Depression Remission PHQ  03/22/2023    Diabetic Foot Exam  03/22/2023    HIB Vaccine  Aged Out    Hepatitis B Vaccine  Aged Out    IPV Vaccine  Aged Out    Hepatitis A Vaccine  Aged Out    Meningococcal ACWY Vaccine  Aged Out  HPV Vaccine  Aged Out     Immunization History   Administered Date(s) Administered    COVID-19 PFIZER VACCINE 0 3 ML IM 08/13/2021, 09/03/2021     BMI Counseling: Body mass index is 27 94 kg/m²  The BMI is above normal  Nutrition recommendations include moderation in carbohydrate intake  Exercise recommendations include exercising 3-5 times per week  No pharmacotherapy was ordered  Patient referred to PCP  Rationale for BMI follow-up plan is due to patient being overweight or obese           [address],

## 2022-03-24 ENCOUNTER — RA CDI HCC (OUTPATIENT)
Dept: OTHER | Facility: HOSPITAL | Age: 47
End: 2022-03-24

## 2022-03-31 ENCOUNTER — APPOINTMENT (OUTPATIENT)
Dept: LAB | Facility: CLINIC | Age: 47
End: 2022-03-31
Payer: COMMERCIAL

## 2022-03-31 DIAGNOSIS — E11.65 TYPE 2 DIABETES MELLITUS WITH HYPERGLYCEMIA, WITHOUT LONG-TERM CURRENT USE OF INSULIN (HCC): ICD-10-CM

## 2022-03-31 DIAGNOSIS — E78.2 MIXED HYPERLIPIDEMIA: ICD-10-CM

## 2022-03-31 DIAGNOSIS — Z11.59 ENCOUNTER FOR HEPATITIS C SCREENING TEST FOR LOW RISK PATIENT: ICD-10-CM

## 2022-03-31 LAB
ALBUMIN SERPL BCP-MCNC: 3.7 G/DL (ref 3.5–5)
ALP SERPL-CCNC: 76 U/L (ref 46–116)
ALT SERPL W P-5'-P-CCNC: 39 U/L (ref 12–78)
ANION GAP SERPL CALCULATED.3IONS-SCNC: 7 MMOL/L (ref 4–13)
AST SERPL W P-5'-P-CCNC: 15 U/L (ref 5–45)
BILIRUB SERPL-MCNC: 0.37 MG/DL (ref 0.2–1)
BUN SERPL-MCNC: 9 MG/DL (ref 5–25)
CALCIUM SERPL-MCNC: 8.9 MG/DL (ref 8.3–10.1)
CHLORIDE SERPL-SCNC: 101 MMOL/L (ref 100–108)
CHOLEST SERPL-MCNC: 203 MG/DL
CO2 SERPL-SCNC: 28 MMOL/L (ref 21–32)
CREAT SERPL-MCNC: 0.88 MG/DL (ref 0.6–1.3)
EST. AVERAGE GLUCOSE BLD GHB EST-MCNC: 269 MG/DL
GFR SERPL CREATININE-BSD FRML MDRD: 103 ML/MIN/1.73SQ M
GLUCOSE P FAST SERPL-MCNC: 154 MG/DL (ref 65–99)
HBA1C MFR BLD: 11 %
HCV AB SER QL: NORMAL
HDLC SERPL-MCNC: 57 MG/DL
LDLC SERPL CALC-MCNC: 109 MG/DL (ref 0–100)
POTASSIUM SERPL-SCNC: 3.9 MMOL/L (ref 3.5–5.3)
PROT SERPL-MCNC: 7.6 G/DL (ref 6.4–8.2)
SODIUM SERPL-SCNC: 136 MMOL/L (ref 136–145)
TRIGL SERPL-MCNC: 185 MG/DL

## 2022-03-31 PROCEDURE — 80053 COMPREHEN METABOLIC PANEL: CPT

## 2022-03-31 PROCEDURE — 86803 HEPATITIS C AB TEST: CPT

## 2022-03-31 PROCEDURE — 36415 COLL VENOUS BLD VENIPUNCTURE: CPT

## 2022-03-31 PROCEDURE — 80061 LIPID PANEL: CPT

## 2022-03-31 PROCEDURE — 3046F HEMOGLOBIN A1C LEVEL >9.0%: CPT | Performed by: FAMILY MEDICINE

## 2022-03-31 PROCEDURE — 83036 HEMOGLOBIN GLYCOSYLATED A1C: CPT

## 2023-05-25 ENCOUNTER — OFFICE VISIT (OUTPATIENT)
Dept: FAMILY MEDICINE CLINIC | Facility: CLINIC | Age: 48
End: 2023-05-25

## 2023-05-25 VITALS
RESPIRATION RATE: 14 BRPM | HEART RATE: 92 BPM | HEIGHT: 68 IN | BODY MASS INDEX: 28.28 KG/M2 | WEIGHT: 186.6 LBS | TEMPERATURE: 96.7 F | DIASTOLIC BLOOD PRESSURE: 88 MMHG | SYSTOLIC BLOOD PRESSURE: 130 MMHG | OXYGEN SATURATION: 98 %

## 2023-05-25 DIAGNOSIS — E11.65 TYPE 2 DIABETES MELLITUS WITH HYPERGLYCEMIA, WITHOUT LONG-TERM CURRENT USE OF INSULIN (HCC): ICD-10-CM

## 2023-05-25 DIAGNOSIS — I10 PRIMARY HYPERTENSION: ICD-10-CM

## 2023-05-25 DIAGNOSIS — F33.41 RECURRENT MAJOR DEPRESSIVE DISORDER, IN PARTIAL REMISSION (HCC): ICD-10-CM

## 2023-05-25 DIAGNOSIS — N52.9 ERECTILE DYSFUNCTION, UNSPECIFIED ERECTILE DYSFUNCTION TYPE: ICD-10-CM

## 2023-05-25 DIAGNOSIS — Z12.11 SCREENING FOR COLON CANCER: ICD-10-CM

## 2023-05-25 DIAGNOSIS — Z87.891 HISTORY OF TOBACCO ABUSE: ICD-10-CM

## 2023-05-25 DIAGNOSIS — G62.9 NEUROPATHY: ICD-10-CM

## 2023-05-25 DIAGNOSIS — R53.83 OTHER FATIGUE: ICD-10-CM

## 2023-05-25 DIAGNOSIS — E78.2 MIXED HYPERLIPIDEMIA: ICD-10-CM

## 2023-05-25 DIAGNOSIS — Z00.00 ANNUAL PHYSICAL EXAM: Primary | ICD-10-CM

## 2023-05-25 PROBLEM — F33.2 SEVERE EPISODE OF RECURRENT MAJOR DEPRESSIVE DISORDER, WITHOUT PSYCHOTIC FEATURES (HCC): Status: RESOLVED | Noted: 2022-03-22 | Resolved: 2023-05-25

## 2023-05-25 LAB — SL AMB POCT HEMOGLOBIN AIC: 13.4 (ref ?–6.5)

## 2023-05-25 RX ORDER — LANCETS 33 GAUGE
EACH MISCELLANEOUS
Qty: 100 EACH | Refills: 3 | Status: SHIPPED | OUTPATIENT
Start: 2023-05-25

## 2023-05-25 RX ORDER — GABAPENTIN 300 MG/1
300 CAPSULE ORAL
Qty: 90 CAPSULE | Refills: 1 | Status: SHIPPED | OUTPATIENT
Start: 2023-05-25

## 2023-05-25 RX ORDER — GLIPIZIDE 10 MG/1
10 TABLET, FILM COATED, EXTENDED RELEASE ORAL DAILY
Qty: 90 TABLET | Refills: 1 | Status: SHIPPED | OUTPATIENT
Start: 2023-05-25

## 2023-05-25 RX ORDER — LISINOPRIL 10 MG/1
10 TABLET ORAL DAILY
Qty: 90 TABLET | Refills: 1 | Status: SHIPPED | OUTPATIENT
Start: 2023-05-25

## 2023-05-25 RX ORDER — ESCITALOPRAM OXALATE 10 MG/1
10 TABLET ORAL DAILY
Qty: 90 TABLET | Refills: 1 | Status: SHIPPED | OUTPATIENT
Start: 2023-05-25

## 2023-05-25 RX ORDER — BLOOD SUGAR DIAGNOSTIC
STRIP MISCELLANEOUS
Qty: 50 STRIP | Refills: 4 | Status: SHIPPED | OUTPATIENT
Start: 2023-05-25

## 2023-05-25 RX ORDER — ATORVASTATIN CALCIUM 20 MG/1
20 TABLET, FILM COATED ORAL DAILY
Qty: 90 TABLET | Refills: 1 | Status: SHIPPED | OUTPATIENT
Start: 2023-05-25

## 2023-05-25 RX ORDER — TADALAFIL 5 MG/1
5 TABLET ORAL DAILY PRN
Qty: 10 TABLET | Refills: 5 | Status: SHIPPED | OUTPATIENT
Start: 2023-05-25

## 2023-05-25 NOTE — PROGRESS NOTES
[address] [facility] [address] [facility]    NAME: Tulio Little  AGE: 56 y o  SEX: male  : 1975     DATE: 2023     Assessment and Plan:     Problem List Items Addressed This Visit        Endocrine    Type 2 diabetes mellitus with hyperglycemia, without long-term current use of insulin (HCC)    Relevant Medications    glucose blood (OneTouch Verio) test strip    OneTouch Delica Lancets 80M MISC    metFORMIN (GLUCOPHAGE) 1000 MG tablet    Empagliflozin (Jardiance) 25 MG TABS    sitaGLIPtin (Januvia) 100 mg tablet    glipiZIDE (GLUCOTROL XL) 10 mg 24 hr tablet    Other Relevant Orders    Ambulatory Referral to Endocrinology    HEMOGLOBIN A1C W/ EAG ESTIMATION    Comprehensive metabolic panel    POCT hemoglobin A1c (Completed)    Albumin / creatinine urine ratio    Ambulatory referral to clinical pharmacy    Ambulatory Referral to Podiatry       Other    Mixed hyperlipidemia    Relevant Medications    atorvastatin (LIPITOR) 20 mg tablet    Other Relevant Orders    Lipid Panel with Direct LDL reflex    Recurrent major depressive disorder, in partial remission (HCC)    Relevant Medications    escitalopram (LEXAPRO) 10 mg tablet   Other Visit Diagnoses     Annual physical exam    -  Primary    Toni  is stable - returning to care  He is to continue a lower carb/salt diet, and get regular exercise  FBW ordered  Primary hypertension        Lisinopril restarted  Relevant Medications    lisinopril (ZESTRIL) 10 mg tablet    Other fatigue        Relevant Orders    CBC and differential    TSH, 3rd generation with Free T4 reflex    History of tobacco abuse        Pt stopped smoking close to 1 year ago! Screening for colon cancer        Referred to GI  Relevant Orders    Ambulatory Referral to Gastroenterology    BMI 28 0-28 9,adult        Diet and exercise as above  Neuropathy        Ongoing in feet  Restarting Gabapentin at bedtime    Referral to Podiatry again ordered  Relevant Medications    gabapentin (NEURONTIN) 300 mg capsule    Other Relevant Orders    Ambulatory Referral to Podiatry    Erectile dysfunction, unspecified erectile dysfunction type        Trying Tadalafil PRN  Relevant Medications    tadalafil (CIALIS) 5 MG tablet          Immunizations and preventive care screenings were discussed with patient today  Appropriate education was printed on patient's after visit summary  Discussed risks and benefits of prostate cancer screening  We discussed the controversial history of PSA screening for prostate cancer in the [place] as well as the risk of over detection and over treatment of prostate cancer by way of PSA screening  The patient understands that PSA blood testing is an imperfect way to screen for prostate cancer and that elevated PSA levels in the blood may also be caused by infection, inflammation, prostatic trauma or manipulation, urological procedures, or by benign prostatic enlargement  The role of the digital rectal examination in prostate cancer screening was also discussed and I discussed with him that there is large interobserver variability in the findings of digital rectal examination  Counseling:  Dental Health: discussed importance of regular tooth brushing, flossing, and dental visits  · Exercise: the importance of regular exercise/physical activity was discussed  Recommend exercise 3-5 times per week for at least 30 minutes  BMI Counseling: Body mass index is 28 51 kg/m²  The BMI is above normal  Nutrition recommendations include moderation in carbohydrate intake  Exercise recommendations include exercising 3-5 times per week  No pharmacotherapy was ordered  Patient referred to PCP  Rationale for BMI follow-up plan is due to patient being overweight or obese  Return in 2 months (on 7/25/2023) for Recheck       Chief Complaint:     Chief Complaint   Patient presents with   • Physical Exam Patient being seen for physical       History of Present Illness:     Adult Annual Physical   Patient here for a comprehensive physical exam  The patient reports no problems  Devora Zavala presents today for the first time since 2022 with his wife  Devora Zavala was just able to recently get insurance again  Immunizations reviewed  Diet and Physical Activity  · Diet/Nutrition: limited junk food  · Exercise: walking  Depression Screening  PHQ-2/9 Depression Screening    Little interest or pleasure in doing things: 0 - not at all  Feeling down, depressed, or hopeless: 0 - not at all  Trouble falling or staying asleep, or sleeping too much: 1 - several days  Feeling tired or having little energy: 1 - several days  Poor appetite or overeatin - not at all  Feeling bad about yourself - or that you are a failure or have let yourself or your family down: 0 - not at all  Trouble concentrating on things, such as reading the newspaper or watching television: 0 - not at all  Moving or speaking so slowly that other people could have noticed  Or the opposite - being so fidgety or restless that you have been moving around a lot more than usual: 0 - not at all  Thoughts that you would be better off dead, or of hurting yourself in some way: 0 - not at all  PHQ-9 Score: 2   PHQ-9 Interpretation: No or Minimal depression        General Health  · Sleep: sleeps well  · Hearing: normal - bilateral   · Vision: no vision problems  · Dental: regular dental visits   Health  · Symptoms include: none     Review of Systems:     Review of Systems   Constitutional: Negative for activity change  Respiratory: Negative for shortness of breath  Cardiovascular: Negative for chest pain  Gastrointestinal: Negative for abdominal pain and blood in stool  Genitourinary: Negative for decreased urine volume and difficulty urinating         +Nocturia  Neurological: Positive for numbness     Psychiatric/Behavioral: Negative for dysphoric mood  The patient is not nervous/anxious  Some mild blue mood occasionally        Past Medical History:     Past Medical History:   Diagnosis Date   • Diabetes mellitus ([address] )    • Hyperlipidemia       Past Surgical History:     Past Surgical History:   Procedure Laterality Date   • HERNIA REPAIR      as baby   • MASS EXCISION Left 2020    Procedure: left hand mass excision;  Surgeon: Roxanne Shaffer MD;  Location: [facility];  Service: Orthopedics      Family History:     Family History   Problem Relation Age of Onset   • Hypertension Mother    • Diabetes Father       Social History:     Social History     Socioeconomic History   • Marital status: Single     Spouse name: None   • Number of children: None   • Years of education: None   • Highest education level: None   Occupational History   • None   Tobacco Use   • Smoking status: Former     Packs/day: 0 25     Years: 30 00     Total pack years: 7 50     Types: Cigarettes     Quit date: 2022     Years since quittin 4   • Smokeless tobacco: Never   • Tobacco comments:     1 cigarrette    Vaping Use   • Vaping Use: Some days   • Substances: Nicotine   Substance and Sexual Activity   • Alcohol use: Yes     Comment: Social   • Drug use: No   • Sexual activity: Yes     Partners: Female   Other Topics Concern   • None   Social History Narrative   • None     Social Determinants of Health     Financial Resource Strain: Not on file   Food Insecurity: Not on file   Transportation Needs: Not on file   Physical Activity: Not on file   Stress: Not on file   Social Connections: Not on file   Intimate Partner Violence: Not on file   Housing Stability: Not on file      Current Medications:     Current Outpatient Medications   Medication Sig Dispense Refill   • atorvastatin (LIPITOR) 20 mg tablet Take 1 tablet (20 mg total) by mouth daily 90 tablet 1   • Blood Glucose Monitoring Suppl (OneTouch Verio) w/Device KIT Use to test 1-2x per day 1 kit 1   • "Empagliflozin (Jardiance) 25 MG TABS Take 1 tablet (25 mg total) by mouth every morning 90 tablet 1   • escitalopram (LEXAPRO) 10 mg tablet Take 1 tablet (10 mg total) by mouth daily 90 tablet 1   • gabapentin (NEURONTIN) 300 mg capsule Take 1 capsule (300 mg total) by mouth daily at bedtime 90 capsule 1   • glipiZIDE (GLUCOTROL XL) 10 mg 24 hr tablet Take 1 tablet (10 mg total) by mouth daily 90 tablet 1   • glucose blood (OneTouch Verio) test strip Use to test 1-2x daily 50 strip 4   • lisinopril (ZESTRIL) 10 mg tablet Take 1 tablet (10 mg total) by mouth daily 90 tablet 1   • metFORMIN (GLUCOPHAGE) 1000 MG tablet Take 1 tablet (1,000 mg total) by mouth 2 (two) times a day with meals 180 tablet 1   • OneTouch Delica Lancets 08E MISC Use to test 1-2x daily 100 each 3   • sitaGLIPtin (Januvia) 100 mg tablet Take 1 tablet (100 mg total) by mouth daily 90 tablet 1   • tadalafil (CIALIS) 5 MG tablet Take 1 tablet (5 mg total) by mouth daily as needed for erectile dysfunction 10 tablet 5     No current facility-administered medications for this visit  Allergies:     No Known Allergies   Physical Exam:     /88 (BP Location: Left arm, Patient Position: Sitting, Cuff Size: Large)   Pulse 92   Temp (!) 96 7 °F (35 9 °C) (Tympanic)   Resp 14   Ht 5' 7 84\" (1 723 m)   Wt 84 6 kg (186 lb 9 6 oz)   SpO2 98%   BMI 28 51 kg/m²     Physical Exam  Vitals and nursing note reviewed  Constitutional:       General: He is not in acute distress  Appearance: Normal appearance  He is not ill-appearing, toxic-appearing or diaphoretic  HENT:      Head: Normocephalic and atraumatic  Eyes:      General: No scleral icterus  Conjunctiva/sclera: Conjunctivae normal    Cardiovascular:      Rate and Rhythm: Normal rate and regular rhythm  Pulses: no weak pulses          Dorsalis pedis pulses are 2+ on the right side and 2+ on the left side          Posterior tibial pulses are 2+ on the right side and 2+ on the " left side  Heart sounds: Normal heart sounds  No murmur heard  No friction rub  No gallop  Pulmonary:      Effort: Pulmonary effort is normal  No respiratory distress  Breath sounds: Normal breath sounds  No stridor  No wheezing, rhonchi or rales  Abdominal:      General: Abdomen is flat  Bowel sounds are normal  There is no distension  Palpations: Abdomen is soft  There is no mass  Tenderness: There is no abdominal tenderness  There is no guarding or rebound  Musculoskeletal:      Cervical back: Normal range of motion and neck supple  No rigidity or tenderness  Feet:    Feet:      Right foot:      Skin integrity: No ulcer, skin breakdown, erythema, warmth, callus or dry skin  Left foot:      Skin integrity: No ulcer, skin breakdown, erythema, warmth, callus or dry skin  Lymphadenopathy:      Cervical: No cervical adenopathy  Neurological:      Mental Status: He is alert and oriented to person, place, and time  Psychiatric:         Mood and Affect: Mood normal          Behavior: Behavior normal          Thought Content: Thought content normal          Judgment: Judgment normal      Patient's shoes and socks removed  Right Foot/Ankle   Right Foot Inspection  Skin Exam: skin normal and skin intact  No dry skin, no warmth, no callus, no erythema, no maceration, no abnormal color, no pre-ulcer, no ulcer and no callus  Toe Exam: ROM and strength within normal limits  Sensory   Monofilament testing: intact    Vascular  Capillary refills: < 3 seconds  The right DP pulse is 2+  The right PT pulse is 2+  Left Foot/Ankle  Left Foot Inspection  Skin Exam: skin normal and skin intact  No dry skin, no warmth, no erythema, no maceration, normal color, no pre-ulcer, no ulcer and no callus  Toe Exam: ROM and strength within normal limits  Sensory   Monofilament testing: intact    Vascular  Capillary refills: < 3 seconds  The left DP pulse is 2+   The left PT pulse is 2+  Assign Risk Category  No deformity present  No loss of protective sensation  No weak pulses  Risk: 0               Mable Ramirez was seen today for physical exam     Diagnoses and all orders for this visit:    Annual physical exam  Comments:  Se Castellanos is stable - returning to care  He is to continue a lower carb/salt diet, and get regular exercise  FBW ordered  Type 2 diabetes mellitus with hyperglycemia, without long-term current use of insulin ([facility])  Comments:  A1c today >13%, but has been out of meds for a year  Restarting meds - pt will add back slowly as discussed  Multiple referrals placed  Orders:  -     Ambulatory Referral to Endocrinology; Future  -     HEMOGLOBIN A1C W/ EAG ESTIMATION; Future  -     Comprehensive metabolic panel; Future  -     POCT hemoglobin A1c  -     Albumin / creatinine urine ratio; Future  -     glucose blood (OneTouch Verio) test strip; Use to test 1-2x daily  -     OneTouch Delica Lancets 78U MISC; Use to test 1-2x daily  -     metFORMIN (GLUCOPHAGE) 1000 MG tablet; Take 1 tablet (1,000 mg total) by mouth 2 (two) times a day with meals  -     Empagliflozin (Jardiance) 25 MG TABS; Take 1 tablet (25 mg total) by mouth every morning  -     sitaGLIPtin (Januvia) 100 mg tablet; Take 1 tablet (100 mg total) by mouth daily  -     glipiZIDE (GLUCOTROL XL) 10 mg 24 hr tablet; Take 1 tablet (10 mg total) by mouth daily  -     Ambulatory referral to clinical pharmacy; Future  -     Ambulatory Referral to Podiatry; Future    Recurrent major depressive disorder, in partial [facility] )  Comments:  Doing ok - restarting Escitalopram   Orders:  -     escitalopram (LEXAPRO) 10 mg tablet; Take 1 tablet (10 mg total) by mouth daily    Primary hypertension  Comments:  Lisinopril restarted  Orders:  -     lisinopril (ZESTRIL) 10 mg tablet; Take 1 tablet (10 mg total) by mouth daily    Mixed hyperlipidemia  Comments:  Atorvastatin refilled    Orders:  -     Lipid Panel with Direct LDL reflex; Future  -     atorvastatin (LIPITOR) 20 mg tablet; Take 1 tablet (20 mg total) by mouth daily    Other fatigue  -     CBC and differential; Future  -     TSH, 3rd generation with Free T4 reflex; Future    History of tobacco abuse  Comments:  Pt stopped smoking close to 1 year ago! Screening for colon cancer  Comments:  Referred to GI  Orders:  -     Ambulatory Referral to Gastroenterology; Future    BMI 28 0-28 9,adult  Comments:  Diet and exercise as above  Neuropathy  Comments:  Ongoing in feet  Restarting Gabapentin at bedtime  Referral to Podiatry again ordered  Orders:  -     gabapentin (NEURONTIN) 300 mg capsule; Take 1 capsule (300 mg total) by mouth daily at bedtime  -     Ambulatory Referral to Podiatry; Future    Erectile dysfunction, unspecified erectile dysfunction type  Comments:  Trying Tadalafil PRN  Orders:  -     tadalafil (CIALIS) 5 MG tablet; Take 1 tablet (5 mg total) by mouth daily as needed for erectile dysfunction          Jessica Connorradha was seen today for physical exam     Diagnoses and all orders for this visit:    Annual physical exam  Comments:  Chente Miles is stable - returning to care  He is to continue a lower carb/salt diet, and get regular exercise  FBW ordered  Type 2 diabetes mellitus with hyperglycemia, without long-term current use of insulin (HCC)  Comments:  A1c today >13%, but has been out of meds for a year  Restarting meds - pt will add back slowly as discussed  Multiple referrals placed  Orders:  -     Ambulatory Referral to Endocrinology; Future  -     HEMOGLOBIN A1C W/ EAG ESTIMATION; Future  -     Comprehensive metabolic panel; Future  -     POCT hemoglobin A1c  -     Albumin / creatinine urine ratio; Future  -     glucose blood (OneTouch Verio) test strip; Use to test 1-2x daily  -     OneTouch Delica Lancets 21U MISC; Use to test 1-2x daily  -     metFORMIN (GLUCOPHAGE) 1000 MG tablet;  Take 1 tablet (1,000 mg total) by mouth 2 (two) times a day with meals  -     Empagliflozin (Jardiance) 25 MG TABS; Take 1 tablet (25 mg total) by mouth every morning  -     sitaGLIPtin (Januvia) 100 mg tablet; Take 1 tablet (100 mg total) by mouth daily  -     glipiZIDE (GLUCOTROL XL) 10 mg 24 hr tablet; Take 1 tablet (10 mg total) by mouth daily  -     Ambulatory referral to clinical pharmacy; Future  -     Ambulatory Referral to Podiatry; Future    Rec[facility]  Comments:  Doing ok - restarting Escitalopram   Orders:  -     escitalopram (LEXAPRO) 10 mg tablet; Take 1 tablet (10 mg total) by mouth daily    Primary hypertension  Comments:  Lisinopril restarted  Orders:  -     lisinopril (ZESTRIL) 10 mg tablet; Take 1 tablet (10 mg total) by mouth daily    Mixed hyperlipidemia  Comments:  Atorvastatin refilled  Orders:  -     Lipid Panel with Direct LDL reflex; Future  -     atorvastatin (LIPITOR) 20 mg tablet; Take 1 tablet (20 mg total) by mouth daily    Other fatigue  -     CBC and differential; Future  -     TSH, 3rd generation with Free T4 reflex; Future    History of tobacco abuse  Comments:  Pt stopped smoking close to 1 year ago! Screening for colon cancer  Comments:  Referred to GI  Orders:  -     Ambulatory Referral to Gastroenterology; Future    BMI 28 0-28 9,adult  Comments:  Diet and exercise as above  Neuropathy  Comments:  Ongoing in feet  Restarting Gabapentin at bedtime  Referral to Podiatry again ordered  Orders:  -     gabapentin (NEURONTIN) 300 mg capsule; Take 1 capsule (300 mg total) by mouth daily at bedtime  -     Ambulatory Referral to Podiatry; Future    Erectile dysfunction, unspecified erectile dysfunction type  Comments:  Trying Tadalafil PRN  Orders:  -     tadalafil (CIALIS) 5 MG tablet;  Take 1 tablet (5 mg total) by mouth daily as needed for erectile dysfunction          Efren Muse, DO  [address] [facility]

## 2023-05-26 RX ORDER — LINAGLIPTIN 5 MG/1
TABLET, FILM COATED ORAL
Refills: 0 | OUTPATIENT
Start: 2023-05-26

## 2023-05-26 NOTE — TELEPHONE ENCOUNTER
Spouse is calling-   Was notified that the Januvia needed authorization  Very concerned about where this script is in that process  Requesting a call back today  Please advise  Thank you

## 2023-06-02 ENCOUNTER — TELEPHONE (OUTPATIENT)
Dept: FAMILY MEDICINE CLINIC | Facility: CLINIC | Age: 48
End: 2023-06-02

## 2023-06-02 NOTE — TELEPHONE ENCOUNTER
Pt's significant other called to say every morning he wakes up with stomach pains that started as soon as he started taking all of his new medications  Just restarted after being off meds for a year  Pain usually goes away by mid morning  Should he be changing any dosages because of this or should he be evaluated?     Please advise

## 2023-06-04 ENCOUNTER — HOSPITAL ENCOUNTER (EMERGENCY)
Facility: HOSPITAL | Age: 48
Discharge: HOME | End: 2023-06-04
Attending: EMERGENCY MEDICINE | Admitting: EMERGENCY MEDICINE
Payer: COMMERCIAL

## 2023-06-04 ENCOUNTER — APPOINTMENT (EMERGENCY)
Dept: CT IMAGING | Facility: HOSPITAL | Age: 48
End: 2023-06-04
Payer: COMMERCIAL

## 2023-06-04 VITALS
RESPIRATION RATE: 18 BRPM | DIASTOLIC BLOOD PRESSURE: 70 MMHG | HEART RATE: 106 BPM | OXYGEN SATURATION: 99 % | TEMPERATURE: 98.4 F | SYSTOLIC BLOOD PRESSURE: 128 MMHG

## 2023-06-04 DIAGNOSIS — R73.9 HYPERGLYCEMIA: ICD-10-CM

## 2023-06-04 DIAGNOSIS — K52.9 ACUTE GASTROENTERITIS: Primary | ICD-10-CM

## 2023-06-04 LAB
ALBUMIN SERPL BCP-MCNC: 4.1 G/DL (ref 3.5–5)
ALP SERPL-CCNC: 77 U/L (ref 34–104)
ALT SERPL W P-5'-P-CCNC: 15 U/L (ref 7–52)
ANION GAP SERPL CALCULATED.3IONS-SCNC: 10 MMOL/L (ref 4–13)
AST SERPL W P-5'-P-CCNC: 12 U/L (ref 13–39)
BACTERIA UR QL AUTO: NORMAL /HPF
BASOPHILS # BLD AUTO: 0.06 THOUSANDS/ÂΜL (ref 0–0.1)
BASOPHILS NFR BLD AUTO: 1 % (ref 0–1)
BILIRUB SERPL-MCNC: 0.46 MG/DL (ref 0.2–1)
BILIRUB UR QL STRIP: NEGATIVE
BUN SERPL-MCNC: 8 MG/DL (ref 5–25)
CALCIUM SERPL-MCNC: 9.4 MG/DL (ref 8.4–10.2)
CHLORIDE SERPL-SCNC: 96 MMOL/L (ref 96–108)
CLARITY UR: CLEAR
CO2 SERPL-SCNC: 26 MMOL/L (ref 21–32)
COLOR UR: ABNORMAL
CREAT SERPL-MCNC: 0.79 MG/DL (ref 0.6–1.3)
EOSINOPHIL # BLD AUTO: 0.08 THOUSAND/ÂΜL (ref 0–0.61)
EOSINOPHIL NFR BLD AUTO: 1 % (ref 0–6)
ERYTHROCYTE [DISTWIDTH] IN BLOOD BY AUTOMATED COUNT: 11.2 % (ref 11.6–15.1)
GFR SERPL CREATININE-BSD FRML MDRD: 107 ML/MIN/1.73SQ M
GLUCOSE SERPL-MCNC: 317 MG/DL (ref 65–140)
GLUCOSE SERPL-MCNC: 335 MG/DL (ref 65–140)
GLUCOSE UR STRIP-MCNC: ABNORMAL MG/DL
HCT VFR BLD AUTO: 44.9 % (ref 36.5–49.3)
HGB BLD-MCNC: 15.7 G/DL (ref 12–17)
HGB UR QL STRIP.AUTO: NEGATIVE
HOLD SPECIMEN: NORMAL
IMM GRANULOCYTES # BLD AUTO: 0.02 THOUSAND/UL (ref 0–0.2)
IMM GRANULOCYTES NFR BLD AUTO: 0 % (ref 0–2)
KETONES UR STRIP-MCNC: NEGATIVE MG/DL
LEUKOCYTE ESTERASE UR QL STRIP: ABNORMAL
LIPASE SERPL-CCNC: 56 U/L (ref 11–82)
LYMPHOCYTES # BLD AUTO: 2.07 THOUSANDS/ÂΜL (ref 0.6–4.47)
LYMPHOCYTES NFR BLD AUTO: 31 % (ref 14–44)
MCH RBC QN AUTO: 32 PG (ref 26.8–34.3)
MCHC RBC AUTO-ENTMCNC: 35 G/DL (ref 31.4–37.4)
MCV RBC AUTO: 92 FL (ref 82–98)
MONOCYTES # BLD AUTO: 0.6 THOUSAND/ÂΜL (ref 0.17–1.22)
MONOCYTES NFR BLD AUTO: 9 % (ref 4–12)
NEUTROPHILS # BLD AUTO: 3.88 THOUSANDS/ÂΜL (ref 1.85–7.62)
NEUTS SEG NFR BLD AUTO: 58 % (ref 43–75)
NITRITE UR QL STRIP: NEGATIVE
NON-SQ EPI CELLS URNS QL MICRO: NORMAL /HPF
NRBC BLD AUTO-RTO: 0 /100 WBCS
PH UR STRIP.AUTO: 7 [PH]
PLATELET # BLD AUTO: 281 THOUSANDS/UL (ref 149–390)
PMV BLD AUTO: 9.8 FL (ref 8.9–12.7)
POTASSIUM SERPL-SCNC: 3.8 MMOL/L (ref 3.5–5.3)
PROT SERPL-MCNC: 7.5 G/DL (ref 6.4–8.4)
PROT UR STRIP-MCNC: NEGATIVE MG/DL
RBC # BLD AUTO: 4.9 MILLION/UL (ref 3.88–5.62)
RBC #/AREA URNS AUTO: NORMAL /HPF
SODIUM SERPL-SCNC: 132 MMOL/L (ref 135–147)
SP GR UR STRIP.AUTO: >=1.05 (ref 1–1.03)
UROBILINOGEN UR STRIP-ACNC: <2 MG/DL
WBC # BLD AUTO: 6.71 THOUSAND/UL (ref 4.31–10.16)
WBC #/AREA URNS AUTO: NORMAL /HPF

## 2023-06-04 PROCEDURE — 36415 COLL VENOUS BLD VENIPUNCTURE: CPT

## 2023-06-04 PROCEDURE — 80053 COMPREHEN METABOLIC PANEL: CPT | Performed by: EMERGENCY MEDICINE

## 2023-06-04 PROCEDURE — 74177 CT ABD & PELVIS W/CONTRAST: CPT

## 2023-06-04 PROCEDURE — 96374 THER/PROPH/DIAG INJ IV PUSH: CPT

## 2023-06-04 PROCEDURE — 81001 URINALYSIS AUTO W/SCOPE: CPT | Performed by: EMERGENCY MEDICINE

## 2023-06-04 PROCEDURE — 85025 COMPLETE CBC W/AUTO DIFF WBC: CPT | Performed by: EMERGENCY MEDICINE

## 2023-06-04 PROCEDURE — 83690 ASSAY OF LIPASE: CPT | Performed by: EMERGENCY MEDICINE

## 2023-06-04 PROCEDURE — G1004 CDSM NDSC: HCPCS

## 2023-06-04 PROCEDURE — 96375 TX/PRO/DX INJ NEW DRUG ADDON: CPT

## 2023-06-04 PROCEDURE — 82948 REAGENT STRIP/BLOOD GLUCOSE: CPT

## 2023-06-04 PROCEDURE — 99284 EMERGENCY DEPT VISIT MOD MDM: CPT

## 2023-06-04 RX ORDER — KETOROLAC TROMETHAMINE 30 MG/ML
15 INJECTION, SOLUTION INTRAMUSCULAR; INTRAVENOUS ONCE
Status: COMPLETED | OUTPATIENT
Start: 2023-06-04 | End: 2023-06-04

## 2023-06-04 RX ORDER — DICYCLOMINE HCL 20 MG
20 TABLET ORAL EVERY 6 HOURS PRN
Qty: 20 TABLET | Refills: 0 | Status: SHIPPED | OUTPATIENT
Start: 2023-06-04 | End: 2023-06-09 | Stop reason: SDUPTHER

## 2023-06-04 RX ORDER — ONDANSETRON 2 MG/ML
4 INJECTION INTRAMUSCULAR; INTRAVENOUS ONCE
Status: COMPLETED | OUTPATIENT
Start: 2023-06-04 | End: 2023-06-04

## 2023-06-04 RX ORDER — ONDANSETRON 4 MG/1
4 TABLET, FILM COATED ORAL EVERY 8 HOURS PRN
Qty: 12 TABLET | Refills: 0 | Status: SHIPPED | OUTPATIENT
Start: 2023-06-04

## 2023-06-04 RX ORDER — DICYCLOMINE HCL 20 MG
20 TABLET ORAL ONCE
Status: COMPLETED | OUTPATIENT
Start: 2023-06-04 | End: 2023-06-04

## 2023-06-04 RX ADMIN — DICYCLOMINE HYDROCHLORIDE 20 MG: 20 TABLET ORAL at 21:26

## 2023-06-04 RX ADMIN — KETOROLAC TROMETHAMINE 15 MG: 30 INJECTION, SOLUTION INTRAMUSCULAR at 21:31

## 2023-06-04 RX ADMIN — IOHEXOL 100 ML: 350 INJECTION, SOLUTION INTRAVENOUS at 19:47

## 2023-06-04 RX ADMIN — ONDANSETRON 4 MG: 2 INJECTION INTRAMUSCULAR; INTRAVENOUS at 21:30

## 2023-06-05 ENCOUNTER — OFFICE VISIT (OUTPATIENT)
Dept: FAMILY MEDICINE CLINIC | Facility: CLINIC | Age: 48
End: 2023-06-05
Payer: COMMERCIAL

## 2023-06-05 VITALS
HEIGHT: 68 IN | OXYGEN SATURATION: 97 % | RESPIRATION RATE: 14 BRPM | HEART RATE: 90 BPM | BODY MASS INDEX: 27.22 KG/M2 | DIASTOLIC BLOOD PRESSURE: 80 MMHG | WEIGHT: 179.6 LBS | SYSTOLIC BLOOD PRESSURE: 118 MMHG | TEMPERATURE: 96.7 F

## 2023-06-05 DIAGNOSIS — K52.9 GASTROENTERITIS: Primary | ICD-10-CM

## 2023-06-05 DIAGNOSIS — I10 PRIMARY HYPERTENSION: ICD-10-CM

## 2023-06-05 DIAGNOSIS — E78.2 MIXED HYPERLIPIDEMIA: ICD-10-CM

## 2023-06-05 DIAGNOSIS — E11.65 TYPE 2 DIABETES MELLITUS WITH HYPERGLYCEMIA, WITHOUT LONG-TERM CURRENT USE OF INSULIN (HCC): ICD-10-CM

## 2023-06-05 PROCEDURE — 99214 OFFICE O/P EST MOD 30 MIN: CPT | Performed by: FAMILY MEDICINE

## 2023-06-05 RX ORDER — METFORMIN HYDROCHLORIDE 500 MG/1
1000 TABLET, EXTENDED RELEASE ORAL
Qty: 180 TABLET | Refills: 1 | Status: SHIPPED | OUTPATIENT
Start: 2023-06-05

## 2023-06-05 NOTE — ED PROVIDER NOTES
History  Chief Complaint   Patient presents with   • Abdominal Pain     Pt c/o severe abd pain since Wednesday, was off diabetic medication for a year and started again recently, nausea, vomit x3    29-year-old male presents the emergency department accompanied by his significant other for evaluation of abdominal pain associated with nausea and diarrhea  Patient states that on Wednesday evening he developed abdominal discomfort  He has been having intermittent episodes of sharp crampy abdominal pain since Wednesday  He reports having an episode of diarrhea yesterday  He did have some temporary relief of his discomfort with the diarrhea however it recurred shortly after  He did have an episode of vomiting this morning  He denies fevers or chills  He states that he was seen by his PCP last week and he restarted his antidiabetic medications on Monday  He has taken these in the past without side effects  He denies dysuria or hematuria  He reports pain with attempting to eat solids but he has been able to tolerate liquids  He has no history of abdominal surgery  No sick contacts or travel    Pain is generalized but most severe in the periumbilical region      History provided by:  Patient and medical records   used: No    Abdominal Pain  Pain location:  Generalized  Pain quality: aching and cramping    Pain radiates to:  Does not radiate  Pain severity:  Severe  Onset quality:  Gradual  Duration:  5 days  Timing:  Intermittent  Progression:  Waxing and waning  Chronicity:  New  Context: eating    Context: not alcohol use, not diet changes, not laxative use, not previous surgeries, not sick contacts and not suspicious food intake    Relieved by:  Nothing  Worsened by:  Eating  Ineffective treatments:  None tried  Associated symptoms: anorexia, diarrhea, nausea and vomiting    Associated symptoms: no chest pain, no chills, no dysuria, no fever, no hematemesis, no hematochezia and no vaginal bleeding    Risk factors: has not had multiple surgeries and no recent hospitalization        Prior to Admission Medications   Prescriptions Last Dose Informant Patient Reported? Taking? Blood Glucose Monitoring Suppl (OneTouch Verio) w/Device KIT   No No   Sig: Use to test 1-2x per day   OneTouch Delica Lancets 52U MISC   No No   Sig: Use to test 1-2x daily   atorvastatin (LIPITOR) 20 mg tablet   No No   Sig: Take 1 tablet (20 mg total) by mouth daily   escitalopram (LEXAPRO) 10 mg tablet   No No   Sig: Take 1 tablet (10 mg total) by mouth daily   gabapentin (NEURONTIN) 300 mg capsule   No No   Sig: Take 1 capsule (300 mg total) by mouth daily at bedtime   glipiZIDE (GLUCOTROL XL) 10 mg 24 hr tablet   No No   Sig: Take 1 tablet (10 mg total) by mouth daily   glucose blood (OneTouch Verio) test strip   No No   Sig: Use to test 1-2x daily   lisinopril (ZESTRIL) 10 mg tablet   No No   Sig: Take 1 tablet (10 mg total) by mouth daily   sitaGLIPtin (Januvia) 100 mg tablet   No No   Sig: Take 1 tablet (100 mg total) by mouth daily   tadalafil (CIALIS) 5 MG tablet   No No   Sig: Take 1 tablet (5 mg total) by mouth daily as needed for erectile dysfunction      Facility-Administered Medications: None       Past Medical History:   Diagnosis Date   • Diabetes mellitus ([facility] [address] )    • Hyperlipidemia        Past Surgical History:   Procedure Laterality Date   • HERNIA REPAIR      as baby   • MASS EXCISION Left 1/14/2020    Procedure: left hand mass excision;  Surgeon: Grey Martini MD;  Location: [facility];  Service: Orthopedics       Family History   Problem Relation Age of Onset   • Hypertension Mother    • Diabetes Father      I have reviewed and agree with the history as documented      E-Cigarette/Vaping   • E-Cigarette Use Current Some Day User      E-Cigarette/Vaping Substances   • Nicotine Yes    • THC No    • CBD No    • Flavoring No    • Other No    • Unknown No      Social History     Tobacco Use   • Smoking status: Former     Packs/day: 0 25     Years: 30 00     Total pack years: 7 50     Types: Cigarettes     Quit date: 2022     Years since quittin 5   • Smokeless tobacco: Never   • Tobacco comments:     1 cigarrette    Vaping Use   • Vaping Use: Some days   • Substances: Nicotine   Substance Use Topics   • Alcohol use: Yes     Comment: Social   • Drug use: No       Review of Systems   Constitutional: Positive for appetite change  Negative for chills and fever  Cardiovascular: Negative for chest pain  Gastrointestinal: Positive for abdominal pain, anorexia, diarrhea, nausea and vomiting  Negative for hematemesis and hematochezia  Genitourinary: Negative for dysuria and vaginal bleeding  All other systems reviewed and are negative  Physical Exam  Physical Exam  Vitals reviewed  Constitutional:       Appearance: Normal appearance  He is well-developed  He is not ill-appearing, toxic-appearing or diaphoretic  HENT:      Head: Normocephalic and atraumatic  Mouth/Throat:      Pharynx: No pharyngeal swelling or oropharyngeal exudate  Eyes:      General: No scleral icterus  Conjunctiva/sclera: Conjunctivae normal       Pupils: Pupils are equal, round, and reactive to light  Cardiovascular:      Rate and Rhythm: Normal rate and regular rhythm  Heart sounds: Normal heart sounds  Pulmonary:      Effort: Pulmonary effort is normal  No accessory muscle usage or respiratory distress  Breath sounds: Normal breath sounds  Chest:      Chest wall: No tenderness  Abdominal:      General: Bowel sounds are normal  There is no distension  Palpations: Abdomen is soft  Tenderness: There is generalized abdominal tenderness and tenderness in the epigastric area  There is no guarding or rebound  Hernia: No hernia is present  Musculoskeletal:         General: No tenderness or deformity  Normal range of motion  Cervical back: Normal range of motion and neck supple  Lymphadenopathy:      Cervical: No cervical adenopathy  Skin:     General: Skin is warm and dry  Findings: No rash  Neurological:      General: No focal deficit present  Mental Status: He is alert and oriented to person, place, and time  Coordination: Coordination normal       Deep Tendon Reflexes: Reflexes are normal and symmetric  Psychiatric:         Behavior: Behavior normal          Thought Content:  Thought content normal          Judgment: Judgment normal          Vital Signs  ED Triage Vitals   Temperature Pulse Respirations Blood Pressure SpO2   06/04/23 1742 06/04/23 1741 06/04/23 1741 06/04/23 1741 06/04/23 1741   98 4 °F (36 9 °C) (!) 114 18 128/70 99 %      Temp Source Heart Rate Source Patient Position - Orthostatic VS BP Location FiO2 (%)   06/04/23 1742 06/04/23 1741 06/04/23 1741 -- --   Oral Monitor Sitting        Pain Score       06/04/23 1741       10 - Worst Possible Pain           Vitals:    06/04/23 1741 06/04/23 1742   BP: 128/70    Pulse: (!) 114 (!) 106   Patient Position - Orthostatic VS: Sitting          Visual Acuity      ED Medications  Medications   iohexol (OMNIPAQUE) 350 MG/ML injection (SINGLE-DOSE) 100 mL (100 mL Intravenous Given 6/4/23 1947)   dicyclomine (BENTYL) tablet 20 mg (20 mg Oral Given 6/4/23 2126)   ondansetron (ZOFRAN) injection 4 mg (4 mg Intravenous Given 6/4/23 2130)   ketorolac (TORADOL) injection 15 mg (15 mg Intravenous Given 6/4/23 2131)       Diagnostic Studies  Results Reviewed     Procedure Component Value Units Date/Time    Urine Microscopic [[id number]]  (Normal) Collected: 06/04/23 2056    Lab Status: Final result Specimen: Urine, Clean Catch Updated: 06/04/23 2123     RBC, UA None Seen /hpf      WBC, UA None Seen /hpf      Epithelial Cells None Seen /hpf      Bacteria, UA None Seen /hpf     UA w Reflex to Microscopic w Reflex to Culture [[id number]]  (Abnormal) Collected: 06/04/23 2056    Lab Status: Final result Specimen: Urine, Clean Catch Updated: 06/04/23 2121     Color, UA Light Yellow     Clarity, UA Clear     Specific Gravity, UA >=1 050     pH, UA 7 0     Leukocytes, UA Elevated glucose may cause decreased leukocyte values  See urine microscopic for UWBC result     Nitrite, UA Negative     Protein, UA Negative mg/dl      Glucose, UA >=1000 (1%) mg/dl      Ketones, UA Negative mg/dl      Urobilinogen, UA <2 0 mg/dl      Bilirubin, UA Negative     Occult Blood, UA Negative    Hayes draw [[id number]] Collected: 06/04/23 1752    Lab Status: Final result Specimen: Blood from Arm, Right Updated: 06/04/23 1901    Narrative: The following orders were created for panel order Hayes draw  Procedure                               Abnormality         Status                     ---------                               -----------         ------                     Thompson Or Top on PNUX[[id number]]                           Final result               Green / Black tube on KPOL[[id number]]                       Final result                 Please view results for these tests on the individual orders      Comprehensive metabolic panel [[id number]]  (Abnormal) Collected: 06/04/23 1752    Lab Status: Final result Specimen: Blood from Arm, Right Updated: 06/04/23 1816     Sodium 132 mmol/L      Potassium 3 8 mmol/L      Chloride 96 mmol/L      CO2 26 mmol/L      ANION GAP 10 mmol/L      BUN 8 mg/dL      Creatinine 0 79 mg/dL      Glucose 335 mg/dL      Calcium 9 4 mg/dL      AST 12 U/L      ALT 15 U/L      Alkaline Phosphatase 77 U/L      Total Protein 7 5 g/dL      Albumin 4 1 g/dL      Total Bilirubin 0 46 mg/dL      eGFR 107 ml/min/1 73sq m     Narrative:      [facility] guidelines for Chronic Kidney Disease (CKD):   •  Stage 1 with normal or high GFR (GFR > 90 mL/min/1 73 square meters)  •  Stage 2 Mild CKD (GFR = 60-89 mL/min/1 73 square meters)  •  Stage 3A Moderate CKD (GFR = 45-59 mL/min/1 73 square meters)  •  Stage 3B Moderate CKD (GFR = 30-44 mL/min/1 73 square meters)  •  Stage 4 Severe CKD (GFR = 15-29 mL/min/1 73 square meters)  •  Stage 5 End Stage CKD (GFR <15 mL/min/1 73 square meters)  Note: GFR calculation is accurate only with a steady state creatinine    Lipase [[id number]]  (Normal) Collected: 06/04/23 1752    Lab Status: Final result Specimen: Blood from Arm, Right Updated: 06/04/23 1816     Lipase 56 u/L     CBC and differential [[id number]]  (Abnormal) Collected: 06/04/23 1752    Lab Status: Final result Specimen: Blood from Arm, Right Updated: 06/04/23 1803     WBC 6 71 Thousand/uL      RBC 4 90 Million/uL      Hemoglobin 15 7 g/dL      Hematocrit 44 9 %      MCV 92 fL      MCH 32 0 pg      MCHC 35 0 g/dL      RDW 11 2 %      MPV 9 8 fL      Platelets 100 Thousands/uL      nRBC 0 /100 WBCs      Neutrophils Relative 58 %      Immat GRANS % 0 %      Lymphocytes Relative 31 %      Monocytes Relative 9 %      Eosinophils Relative 1 %      Basophils Relative 1 %      Neutrophils Absolute 3 88 Thousands/µL      Immature Grans Absolute 0 02 Thousand/uL      Lymphocytes Absolute 2 07 Thousands/µL      Monocytes Absolute 0 60 Thousand/µL      Eosinophils Absolute 0 08 Thousand/µL      Basophils Absolute 0 06 Thousands/µL     Fingerstick Glucose (POCT) [[id number]]  (Abnormal) Collected: 06/04/23 1748    Lab Status: Final result Updated: 06/04/23 1752     POC Glucose 317 mg/dl                  CT abdomen pelvis with contrast   Final Result by Dalia Ball MD (06/04 2057)      1  No acute process in the abdomen and pelvis identified  2  Mild bilateral hip osteoarthritis  3  Transposition of the inferior vena cava  Workstation performed: [id number]                    Procedures  Procedures         ED Course                                             Medical Decision Making  52-year-old male presents with generalized abdominal pain with associated nausea vomiting and diarrhea    Differential diagnosis includes but is not limited to acute viral gastroenteritis, foodborne illness, pancreatitis, gastritis, gastric outlet obstruction, gastroparesis, acute enteritis, colitis, side effect of diabetic medications  Acute gastroenteritis: acute illness or injury  Hyperglycemia: acute illness or injury  Amount and/or Complexity of Data Reviewed  Independent Historian: spouse     Details: Significant other at bedside to help provide history  External Data Reviewed: notes  Details: Notes from most recent PCP office visit reviewed by me  Labs: ordered  Details: Labs ordered and reviewed by me  Patient with hyperglycemia otherwise unremarkable lab work  Radiology: ordered  Details: CT ordered by me and interpreted by radiology  Negative for acute abdominal pathology  Incidental findings noted and communicated with patient  Risk  Prescription drug management  Risk Details: 52-year-old male presents with 4-day history of crampy abdominal pain associated with vomiting and diarrhea  Suspect symptoms are secondary to acute viral gastroenteritis  Less likely symptoms are caused by the restarting of his oral hypoglycemics  Patient encouraged to modify diet while symptomatic, limit milk containing products  Discussed signs and symptoms to return to the emergency department  Patient felt comfortable discharge plan        Disposition  Final diagnoses:   Acute gastroenteritis   Hyperglycemia     Time reflects when diagnosis was documented in both MDM as applicable and the Disposition within this note     Time User Action Codes Description Comment    6/4/2023  9:15 PM Jena Davison Add [K52 9] Acute gastroenteritis     6/4/2023  9:15 PM Iain Swann Add [R73 9] Hyperglycemia       ED Disposition     ED Disposition   Discharge    Condition   Stable    Date/Time   Sun Jun 4, 2023  9:15 PM    Luis A discharge to home/self care                 Follow-up Information     Follow up With Specialties Details Why Contact Info    [address] Marquez DO Family Medicine Schedule an appointment as soon as possible for a visit in 1 day For recheck of current symptoms [address] [facility]  [address] [facility]   [phone]            Discharge Medication List as of 6/4/2023  9:17 PM      START taking these medications    Details   dicyclomine (BENTYL) 20 mg tablet Take 1 tablet (20 mg total) by mouth every 6 (six) hours as needed (Abdominal cramping), Starting Sun 6/4/2023, Normal      ondansetron (ZOFRAN) 4 mg tablet Take 1 tablet (4 mg total) by mouth every 8 (eight) hours as needed for nausea or vomiting, Starting Sun 6/4/2023, Normal         CONTINUE these medications which have NOT CHANGED    Details   atorvastatin (LIPITOR) 20 mg tablet Take 1 tablet (20 mg total) by mouth daily, Starting Thu 5/25/2023, Normal      Blood Glucose Monitoring Suppl (OneTouch Verio) w/Device KIT Use to test 1-2x per day, Normal      escitalopram (LEXAPRO) 10 mg tablet Take 1 tablet (10 mg total) by mouth daily, Starting u 5/25/2023, Normal      gabapentin (NEURONTIN) 300 mg capsule Take 1 capsule (300 mg total) by mouth daily at bedtime, Starting Thu 5/25/2023, Normal      glipiZIDE (GLUCOTROL XL) 10 mg 24 hr tablet Take 1 tablet (10 mg total) by mouth daily, Starting Thu 5/25/2023, Normal      glucose blood (OneTouch Verio) test strip Use to test 1-2x daily, Normal      lisinopril (ZESTRIL) 10 mg tablet Take 1 tablet (10 mg total) by mouth daily, Starting u 5/25/2023, Normal      OneTouch Delica Lancets 02Q MISC Use to test 1-2x daily, Normal      sitaGLIPtin (Januvia) 100 mg tablet Take 1 tablet (100 mg total) by mouth daily, Starting Thu 5/25/2023, Normal      tadalafil (CIALIS) 5 MG tablet Take 1 tablet (5 mg total) by mouth daily as needed for erectile dysfunction, Starting Thu 5/25/2023, Normal      Empagliflozin (Jardiance) 25 MG TABS Take 1 tablet (25 mg total) by mouth every morning, Starting Thu 5/25/2023, Normal metFORMIN (GLUCOPHAGE) 1000 MG tablet Take 1 tablet (1,000 mg total) by mouth 2 (two) times a day with meals, Starting Thu 5/25/2023, Normal                 PDMP Review       Value Time User    PDMP Reviewed  Yes 1/14/2020 12:17 PM Sam Mcginnis MD          ED Provider  Electronically Signed by           Concetta Esposito DO  06/07/23 [id number]

## 2023-06-05 NOTE — PROGRESS NOTES
FAMILY PRACTICE OFFICE VISIT       NAME: Joelle Monterroso  AGE: 56 y o  SEX: male       : 1975        MRN: [id number]    DATE: 2023  TIME: 2:42 PM    Assessment and Plan   1  Gastroenteritis  Comments:  Resolving; pt stable  Continue bland diet, advance slowly  Will look at adjusting diabetes meds short term - Switching to long-acting Metformin  2  Type 2 diabetes mellitus with hyperglycemia, without long-term current use of insulin (HCC)  Comments:  Stable; pt back on meds - see above  Switching pt over to long-acting Metformin  Continue a lower carb/salt diet, and remain active  Orders:  -     metFORMIN (GLUCOPHAGE-XR) 500 mg 24 hr tablet; Take 2 tablets (1,000 mg total) by mouth daily with dinner    3  Primary hypertension  Comments:  Reasonably controlled now, back on medication  4  Mixed hyperlipidemia  Comments:  Stable; back on Atorvastatin  There are no Patient Instructions on file for this visit  Chief Complaint     Chief Complaint   Patient presents with   • Abdominal Pain     Patient being seen for abdominal pain x 1 5 weeks since starting DM medication        History of Present Illness   Joelle Monterroso is a 56y o -year-old male who presents in f/u from his trip to the ER yesterday with abdominal pain, N/V/D, etc   Records reviewed  At the ER - no acute findings on CT A/P  Labs - CBC normal, Lipase normal   Is feeling a little better at this time  We discussed options  He received Januvia and Metformin (short-acting) after his last visit; Kian Morillo still pending pre-authorization  Review of Systems   Review of Systems   Constitutional: Negative for activity change and fever  Respiratory: Negative for shortness of breath  Cardiovascular: Negative for chest pain  Gastrointestinal: Positive for abdominal pain  Negative for blood in stool and diarrhea  Very mild abd pain, but not near as bad as it had been    Responded to Ondansetron and PRN Dicyclomine  Genitourinary: Negative for dysuria         Active Problem List     Patient Active Problem List   Diagnosis   • Type 2 diabetes mellitus with hyperglycemia, without long-term current use of insulin (HCC)   • Mixed hyperlipidemia   • Recurrent major depressive disorder, in partial remission (HCC)   • Mass of hand, left   • Mass of left hand   • Right leg paresthesias         Past Medical History:  Past Medical History:   Diagnosis Date   • Diabetes mellitus ([address] )    • Hyperlipidemia        Past Surgical History:  Past Surgical History:   Procedure Laterality Date   • HERNIA REPAIR      as baby   • MASS EXCISION Left 2020    Procedure: left hand mass excision;  Surgeon: Sam Mcginnis MD;  Location: [facility];  Service: Orthopedics       Family History:  Family History   Problem Relation Age of Onset   • Hypertension Mother    • Diabetes Father        Social History:  Social History     Socioeconomic History   • Marital status: Single     Spouse name: Not on file   • Number of children: Not on file   • Years of education: Not on file   • Highest education level: Not on file   Occupational History   • Not on file   Tobacco Use   • Smoking status: Former     Packs/day: 0 25     Years: 30 00     Total pack years: 7 50     Types: Cigarettes     Quit date: 2022     Years since quittin 5   • Smokeless tobacco: Never   • Tobacco comments:     1 cigarrette    Vaping Use   • Vaping Use: Some days   • Substances: Nicotine   Substance and Sexual Activity   • Alcohol use: Yes     Comment: Social   • Drug use: No   • Sexual activity: Yes     Partners: Female   Other Topics Concern   • Not on file   Social History Narrative   • Not on file     Social Determinants of Health     Financial Resource Strain: Not on file   Food Insecurity: Not on file   Transportation Needs: Not on file   Physical Activity: Not on file   Stress: Not on file   Social Connections: Not on file   Intimate Partner Violence: Not on file   Housing Stability: Not on file       Objective     Vitals:    06/05/23 1413   BP: 118/80   Pulse: 90   Resp: 14   Temp: (!) 96 7 °F (35 9 °C)   SpO2: 97%     Wt Readings from Last 3 Encounters:   06/05/23 81 5 kg (179 lb 9 6 oz)   05/25/23 84 6 kg (186 lb 9 6 oz)   03/22/22 85 8 kg (189 lb 3 2 oz)       Physical Exam  Vitals and nursing note reviewed  Constitutional:       General: He is not in acute distress  Appearance: Normal appearance  He is well-developed  He is not ill-appearing, toxic-appearing or diaphoretic  HENT:      Head: Normocephalic and atraumatic  Mouth/Throat:      Mouth: Mucous membranes are moist       Pharynx: Oropharynx is clear  No oropharyngeal exudate or posterior oropharyngeal erythema  Eyes:      General: No scleral icterus  Conjunctiva/sclera: Conjunctivae normal    Cardiovascular:      Rate and Rhythm: Normal rate and regular rhythm  Heart sounds: Normal heart sounds  No murmur heard  No friction rub  No gallop  Pulmonary:      Effort: Pulmonary effort is normal  No respiratory distress  Breath sounds: Normal breath sounds  No stridor  No wheezing, rhonchi or rales  Abdominal:      General: Abdomen is flat  Bowel sounds are normal  There is no distension  Palpations: Abdomen is soft  There is no mass  Tenderness: There is no abdominal tenderness  There is no guarding or rebound  Musculoskeletal:      Cervical back: Normal range of motion and neck supple  No rigidity or tenderness  Lymphadenopathy:      Cervical: No cervical adenopathy  Neurological:      Mental Status: He is alert and oriented to person, place, and time  Psychiatric:         Mood and Affect: Mood normal          Behavior: Behavior normal          Thought Content:  Thought content normal          Judgment: Judgment normal          Pertinent Laboratory/Diagnostic Studies:  Lab Results   Component Value Date    BUN 8 06/04/2023    CALCIUM 9 4 06/04/2023    CL "96 06/04/2023    CO2 26 06/04/2023    CREATININE 0 79 06/04/2023    K 3 8 06/04/2023     Lab Results   Component Value Date    ALKPHOS 77 06/04/2023    ALT 15 06/04/2023    AST 12 (L) 06/04/2023       Lab Results   Component Value Date    HCT 44 9 06/04/2023    HGB 15 7 06/04/2023    MCV 92 06/04/2023     06/04/2023    WBC 6 71 06/04/2023       No results found for: \"TSH\"    No results found for: \"CHOL\"  Lab Results   Component Value Date    TRIG 185 (H) 03/31/2022     Lab Results   Component Value Date    HDL 57 03/31/2022     Lab Results   Component Value Date    LDLCALC 109 (H) 03/31/2022     Lab Results   Component Value Date    HGBA1C 13 4 (A) 05/25/2023       Results for orders placed or performed during the hospital encounter of 06/04/23   CBC and differential   Result Value Ref Range    WBC 6 71 4 31 - 10 16 Thousand/uL    RBC 4 90 3 88 - 5 62 Million/uL    Hemoglobin 15 7 12 0 - 17 0 g/dL    Hematocrit 44 9 36 5 - 49 3 %    MCV 92 82 - 98 fL    MCH 32 0 26 8 - 34 3 pg    MCHC 35 0 31 4 - 37 4 g/dL    RDW 11 2 (L) 11 6 - 15 1 %    MPV 9 8 8 9 - 12 7 fL    Platelets 565 493 - 999 Thousands/uL    nRBC 0 /100 WBCs    Neutrophils Relative 58 43 - 75 %    Immat GRANS % 0 0 - 2 %    Lymphocytes Relative 31 14 - 44 %    Monocytes Relative 9 4 - 12 %    Eosinophils Relative 1 0 - 6 %    Basophils Relative 1 0 - 1 %    Neutrophils Absolute 3 88 1 85 - 7 62 Thousands/µL    Immature Grans Absolute 0 02 0 00 - 0 20 Thousand/uL    Lymphocytes Absolute 2 07 0 60 - 4 47 Thousands/µL    Monocytes Absolute 0 60 0 17 - 1 22 Thousand/µL    Eosinophils Absolute 0 08 0 00 - 0 61 Thousand/µL    Basophils Absolute 0 06 0 00 - 0 10 Thousands/µL   Comprehensive metabolic panel   Result Value Ref Range    Sodium 132 (L) 135 - 147 mmol/L    Potassium 3 8 3 5 - 5 3 mmol/L    Chloride 96 96 - 108 mmol/L    CO2 26 21 - 32 mmol/L    ANION GAP 10 4 - 13 mmol/L    BUN 8 5 - 25 mg/dL    Creatinine 0 79 0 60 - 1 30 mg/dL    Glucose 335 " (H) 65 - 140 mg/dL    Calcium 9 4 8 4 - 10 2 mg/dL    AST 12 (L) 13 - 39 U/L    ALT 15 7 - 52 U/L    Alkaline Phosphatase 77 34 - 104 U/L    Total Protein 7 5 6 4 - 8 4 g/dL    Albumin 4 1 3 5 - 5 0 g/dL    Total Bilirubin 0 46 0 20 - 1 00 mg/dL    eGFR 107 ml/min/1 73sq m   Lipase   Result Value Ref Range    Lipase 56 11 - 82 u/L   UA w Reflex to Microscopic w Reflex to Culture    Specimen: Urine, Clean Catch   Result Value Ref Range    Color, UA Light Yellow     Clarity, UA Clear     Specific Gravity, UA >=1 050 (H) 1 003 - 1 030    pH, UA 7 0 4 5, 5 0, 5 5, 6 0, 6 5, 7 0, 7 5, 8 0    Leukocytes, UA (A) Negative     Elevated glucose may cause decreased leukocyte values  See urine microscopic for UWBC result    Nitrite, UA Negative Negative    Protein, UA Negative Negative mg/dl    Glucose, UA >=1000 (1%) (A) Negative mg/dl    Ketones, UA Negative Negative mg/dl    Urobilinogen, UA <2 0 <2 0 mg/dl mg/dl    Bilirubin, UA Negative Negative    Occult Blood, UA Negative Negative   Urine Microscopic   Result Value Ref Range    RBC, UA None Seen None Seen, 1-2 /hpf    WBC, UA None Seen None Seen, 1-2 /hpf    Epithelial Cells None Seen None Seen, Occasional /hpf    Bacteria, UA None Seen None Seen, Occasional /hpf   Fingerstick Glucose (POCT)   Result Value Ref Range    POC Glucose 317 (H) 65 - 140 mg/dl   Green / Black tube on hold   Result Value Ref Range    Extra Tube Hold for add-ons  No orders of the defined types were placed in this encounter        ALLERGIES:  No Known Allergies    Current Medications     Current Outpatient Medications   Medication Sig Dispense Refill   • atorvastatin (LIPITOR) 20 mg tablet Take 1 tablet (20 mg total) by mouth daily 90 tablet 1   • Blood Glucose Monitoring Suppl (OneTouch Verio) w/Device KIT Use to test 1-2x per day 1 kit 1   • dicyclomine (BENTYL) 20 mg tablet Take 1 tablet (20 mg total) by mouth every 6 (six) hours as needed (Abdominal cramping) 20 tablet 0   • escitalopram (LEXAPRO) 10 mg tablet Take 1 tablet (10 mg total) by mouth daily 90 tablet 1   • gabapentin (NEURONTIN) 300 mg capsule Take 1 capsule (300 mg total) by mouth daily at bedtime 90 capsule 1   • glipiZIDE (GLUCOTROL XL) 10 mg 24 hr tablet Take 1 tablet (10 mg total) by mouth daily 90 tablet 1   • glucose blood (OneTouch Verio) test strip Use to test 1-2x daily 50 strip 4   • lisinopril (ZESTRIL) 10 mg tablet Take 1 tablet (10 mg total) by mouth daily 90 tablet 1   • metFORMIN (GLUCOPHAGE-XR) 500 mg 24 hr tablet Take 2 tablets (1,000 mg total) by mouth daily with dinner 180 tablet 1   • ondansetron (ZOFRAN) 4 mg tablet Take 1 tablet (4 mg total) by mouth every 8 (eight) hours as needed for nausea or vomiting 12 tablet 0   • OneTouch Delica Lancets 03B MISC Use to test 1-2x daily 100 each 3   • sitaGLIPtin (Januvia) 100 mg tablet Take 1 tablet (100 mg total) by mouth daily 90 tablet 1   • tadalafil (CIALIS) 5 MG tablet Take 1 tablet (5 mg total) by mouth daily as needed for erectile dysfunction 10 tablet 5   • Empagliflozin (Jardiance) 25 MG TABS Take 1 tablet (25 mg total) by mouth every morning (Patient not taking: Reported on 6/5/2023) 90 tablet 1     No current facility-administered medications for this visit           Health Maintenance     Health Maintenance   Topic Date Due   • Pneumococcal Vaccine: Pediatrics (0 to 5 Years) and At-Risk Patients (6 to 59 Years) (1 - PCV) Never done   • HIV Screening  Never done   • DTaP,Tdap,and Td Vaccines (1 - Tdap) Never done   • Kidney Health Evaluation: Albumin/Creatinine Ratio  07/17/2020   • Colorectal Cancer Screening  Never done   • COVID-19 Vaccine (3 - Pfizer series) 10/29/2021   • DM Eye Exam  10/12/2022   • Influenza Vaccine (Season Ended) 09/01/2023   • Depression Remission PHQ  11/25/2023   • HEMOGLOBIN A1C  11/25/2023   • BMI: Followup Plan  05/25/2024   • Annual Physical  05/25/2024   • Diabetic Foot Exam  05/25/2024   • Kidney Health Evaluation: GFR  06/04/2024 • BMI: Adult  06/05/2024   • Hepatitis C Screening  Completed   • HIB Vaccine  Aged Out   • IPV Vaccine  Aged Out   • Hepatitis A Vaccine  Aged Out   • Meningococcal ACWY Vaccine  Aged Out   • HPV Vaccine  Aged Out     Immunization History   Administered Date(s) Administered   • COVID-19 PFIZER VACCINE 0 3 ML IM 08/13/2021, 09/03/2021          Efren Muse DO

## 2023-06-08 ENCOUNTER — TELEPHONE (OUTPATIENT)
Dept: ADMINISTRATIVE | Facility: OTHER | Age: 48
End: 2023-06-08

## 2023-06-08 ENCOUNTER — TELEPHONE (OUTPATIENT)
Dept: FAMILY MEDICINE CLINIC | Facility: CLINIC | Age: 48
End: 2023-06-08

## 2023-06-08 DIAGNOSIS — R10.13 DYSPEPSIA: Primary | ICD-10-CM

## 2023-06-08 RX ORDER — FAMOTIDINE 20 MG/1
20 TABLET, FILM COATED ORAL 2 TIMES DAILY
Qty: 60 TABLET | Refills: 2 | Status: SHIPPED | OUTPATIENT
Start: 2023-06-08 | End: 2023-08-02

## 2023-06-08 NOTE — TELEPHONE ENCOUNTER
Upon review of the In Basket request and the patient's chart, initial outreach has been made via fax to facility. Please see Contacts section for details.      Thank you  Bjorn Becerra

## 2023-06-08 NOTE — TELEPHONE ENCOUNTER
Patients S/o made aware of below and patient scheduled for a follow up  Patient is asking if there is anything you can send to the pharmacy for abdominal pain (non-narcotic)  Patient did have toradol in the hospital that was effective

## 2023-06-08 NOTE — TELEPHONE ENCOUNTER
Pt's wife called back and stated that he stopped the metformin for 2 days  He is also having tenderness in the abdomen  Temp is: 99  He had an ultrasound in the ER

## 2023-06-08 NOTE — LETTER
Diabetic Eye Exam Form    Date Requested: 23  Patient: Humberto Oscar  Patient : 1975   Referring Provider: Monisha Emerson DO      DIABETIC Eye Exam Date _______________________________      Type of Exam MUST be documented for Diabetic Eye Exams. Please CHECK ONE. Retinal Exam       Dilated Retinal Exam       OCT       Optomap-Iris Exam      Fundus Photography       Left Eye - Please check Retinopathy or No Retinopathy        Exam did show retinopathy    Exam did not show retinopathy       Right Eye - Please check Retinopathy or No Retinopathy       Exam did show retinopathy    Exam did not show retinopathy       Comments __________________________________________________________    Practice Providing Exam ______________________________________________    Exam Performed By (print name) _______________________________________      Provider Signature ___________________________________________________      These reports are needed for  compliance. Please fax this completed form and a copy of the Diabetic Eye Exam report to our office located at [address] [place] [address] as soon as possible via Fax [phone] linda Tristan Liner: Phone [phone]  We thank you for your assistance in treating our mutual patient.

## 2023-06-08 NOTE — TELEPHONE ENCOUNTER
----- Message from Rosa Potter LPN sent at 8/3/5584 11:20 AM EDT -----  Regarding: care gap request  06/07/23 11:20 AM    Hello, our patient attached above has had Diabetic Eye Exam completed/performed. Please assist in updating the patient chart by making an External outreach to [facility]. facility located in [place], [place]. The date of service is 2022.     Thank you,  Rosa Peraltau FP

## 2023-06-08 NOTE — TELEPHONE ENCOUNTER
Pt's Significant other, Elli Bishop (on comm consent form) called re:     Pt is still having diarrhea, abdominal pain, nausea, vomiting, loss of appetite, weight loss, sweating for over 1 week  He started the Metformin but, is asking if he is having a reaction to it or if there can be an adjustment to the dosage       Please advise

## 2023-06-09 ENCOUNTER — OFFICE VISIT (OUTPATIENT)
Dept: FAMILY MEDICINE CLINIC | Facility: CLINIC | Age: 48
End: 2023-06-09
Payer: COMMERCIAL

## 2023-06-09 ENCOUNTER — APPOINTMENT (OUTPATIENT)
Dept: LAB | Facility: CLINIC | Age: 48
End: 2023-06-09
Payer: COMMERCIAL

## 2023-06-09 VITALS
HEIGHT: 67 IN | RESPIRATION RATE: 14 BRPM | WEIGHT: 178.2 LBS | DIASTOLIC BLOOD PRESSURE: 82 MMHG | SYSTOLIC BLOOD PRESSURE: 136 MMHG | BODY MASS INDEX: 27.97 KG/M2 | TEMPERATURE: 96.5 F | HEART RATE: 91 BPM | OXYGEN SATURATION: 97 %

## 2023-06-09 DIAGNOSIS — R10.13 EPIGASTRIC PAIN: ICD-10-CM

## 2023-06-09 DIAGNOSIS — E78.2 MIXED HYPERLIPIDEMIA: ICD-10-CM

## 2023-06-09 DIAGNOSIS — E11.65 TYPE 2 DIABETES MELLITUS WITH HYPERGLYCEMIA, WITHOUT LONG-TERM CURRENT USE OF INSULIN (HCC): ICD-10-CM

## 2023-06-09 DIAGNOSIS — K52.9 GASTROENTERITIS: Primary | ICD-10-CM

## 2023-06-09 DIAGNOSIS — I10 PRIMARY HYPERTENSION: ICD-10-CM

## 2023-06-09 DIAGNOSIS — R53.83 OTHER FATIGUE: ICD-10-CM

## 2023-06-09 LAB
ALBUMIN SERPL BCP-MCNC: 4.1 G/DL (ref 3.5–5)
ALP SERPL-CCNC: 69 U/L (ref 34–104)
ALT SERPL W P-5'-P-CCNC: 15 U/L (ref 7–52)
ANION GAP SERPL CALCULATED.3IONS-SCNC: 10 MMOL/L (ref 4–13)
AST SERPL W P-5'-P-CCNC: 11 U/L (ref 13–39)
BACTERIA UR QL AUTO: ABNORMAL /HPF
BASOPHILS # BLD AUTO: 0.08 THOUSANDS/ÂΜL (ref 0–0.1)
BASOPHILS NFR BLD AUTO: 1 % (ref 0–1)
BILIRUB SERPL-MCNC: 0.4 MG/DL (ref 0.2–1)
BILIRUB UR QL STRIP: NEGATIVE
BUN SERPL-MCNC: 9 MG/DL (ref 5–25)
CALCIUM SERPL-MCNC: 9.3 MG/DL (ref 8.4–10.2)
CHLORIDE SERPL-SCNC: 96 MMOL/L (ref 96–108)
CHOLEST SERPL-MCNC: 159 MG/DL
CLARITY UR: CLEAR
CO2 SERPL-SCNC: 29 MMOL/L (ref 21–32)
COLOR UR: YELLOW
CREAT SERPL-MCNC: 0.84 MG/DL (ref 0.6–1.3)
CREAT UR-MCNC: 286 MG/DL
EOSINOPHIL # BLD AUTO: 0.09 THOUSAND/ÂΜL (ref 0–0.61)
EOSINOPHIL NFR BLD AUTO: 1 % (ref 0–6)
ERYTHROCYTE [DISTWIDTH] IN BLOOD BY AUTOMATED COUNT: 11.4 % (ref 11.6–15.1)
EST. AVERAGE GLUCOSE BLD GHB EST-MCNC: 289 MG/DL
GFR SERPL CREATININE-BSD FRML MDRD: 105 ML/MIN/1.73SQ M
GLUCOSE SERPL-MCNC: 286 MG/DL (ref 65–140)
GLUCOSE UR STRIP-MCNC: ABNORMAL MG/DL
HBA1C MFR BLD: 11.7 %
HCT VFR BLD AUTO: 45.6 % (ref 36.5–49.3)
HDLC SERPL-MCNC: 51 MG/DL
HGB BLD-MCNC: 16.3 G/DL (ref 12–17)
HGB UR QL STRIP.AUTO: NEGATIVE
IMM GRANULOCYTES # BLD AUTO: 0.02 THOUSAND/UL (ref 0–0.2)
IMM GRANULOCYTES NFR BLD AUTO: 0 % (ref 0–2)
KETONES UR STRIP-MCNC: NEGATIVE MG/DL
LDLC SERPL CALC-MCNC: 71 MG/DL (ref 0–100)
LEUKOCYTE ESTERASE UR QL STRIP: ABNORMAL
LIPASE SERPL-CCNC: 53 U/L (ref 11–82)
LYMPHOCYTES # BLD AUTO: 1.78 THOUSANDS/ÂΜL (ref 0.6–4.47)
LYMPHOCYTES NFR BLD AUTO: 28 % (ref 14–44)
MCH RBC QN AUTO: 32.1 PG (ref 26.8–34.3)
MCHC RBC AUTO-ENTMCNC: 35.7 G/DL (ref 31.4–37.4)
MCV RBC AUTO: 90 FL (ref 82–98)
MICROALBUMIN UR-MCNC: 24.9 MG/L (ref 0–20)
MICROALBUMIN/CREAT 24H UR: 9 MG/G CREATININE (ref 0–30)
MONOCYTES # BLD AUTO: 0.53 THOUSAND/ÂΜL (ref 0.17–1.22)
MONOCYTES NFR BLD AUTO: 9 % (ref 4–12)
MUCOUS THREADS UR QL AUTO: ABNORMAL
NEUTROPHILS # BLD AUTO: 3.76 THOUSANDS/ÂΜL (ref 1.85–7.62)
NEUTS SEG NFR BLD AUTO: 61 % (ref 43–75)
NITRITE UR QL STRIP: NEGATIVE
NON-SQ EPI CELLS URNS QL MICRO: ABNORMAL /HPF
NRBC BLD AUTO-RTO: 0 /100 WBCS
PH UR STRIP.AUTO: 6 [PH]
PLATELET # BLD AUTO: 321 THOUSANDS/UL (ref 149–390)
PMV BLD AUTO: 9.6 FL (ref 8.9–12.7)
POTASSIUM SERPL-SCNC: 4 MMOL/L (ref 3.5–5.3)
PROT SERPL-MCNC: 7.3 G/DL (ref 6.4–8.4)
PROT UR STRIP-MCNC: ABNORMAL MG/DL
RBC # BLD AUTO: 5.08 MILLION/UL (ref 3.88–5.62)
RBC #/AREA URNS AUTO: ABNORMAL /HPF
SODIUM SERPL-SCNC: 135 MMOL/L (ref 135–147)
SP GR UR STRIP.AUTO: 1.02 (ref 1–1.03)
TRIGL SERPL-MCNC: 184 MG/DL
TSH SERPL DL<=0.05 MIU/L-ACNC: 0.93 UIU/ML (ref 0.45–4.5)
UROBILINOGEN UR STRIP-ACNC: 3 MG/DL
WBC # BLD AUTO: 6.26 THOUSAND/UL (ref 4.31–10.16)
WBC #/AREA URNS AUTO: ABNORMAL /HPF

## 2023-06-09 PROCEDURE — 82043 UR ALBUMIN QUANTITATIVE: CPT

## 2023-06-09 PROCEDURE — 36415 COLL VENOUS BLD VENIPUNCTURE: CPT

## 2023-06-09 PROCEDURE — 83036 HEMOGLOBIN GLYCOSYLATED A1C: CPT

## 2023-06-09 PROCEDURE — 81001 URINALYSIS AUTO W/SCOPE: CPT | Performed by: FAMILY MEDICINE

## 2023-06-09 PROCEDURE — 80053 COMPREHEN METABOLIC PANEL: CPT

## 2023-06-09 PROCEDURE — 85025 COMPLETE CBC W/AUTO DIFF WBC: CPT

## 2023-06-09 PROCEDURE — 82570 ASSAY OF URINE CREATININE: CPT

## 2023-06-09 PROCEDURE — 83690 ASSAY OF LIPASE: CPT

## 2023-06-09 PROCEDURE — 80061 LIPID PANEL: CPT

## 2023-06-09 PROCEDURE — 84443 ASSAY THYROID STIM HORMONE: CPT

## 2023-06-09 PROCEDURE — 87086 URINE CULTURE/COLONY COUNT: CPT

## 2023-06-09 PROCEDURE — 99214 OFFICE O/P EST MOD 30 MIN: CPT | Performed by: FAMILY MEDICINE

## 2023-06-09 RX ORDER — DICYCLOMINE HCL 20 MG
20 TABLET ORAL EVERY 6 HOURS PRN
Qty: 20 TABLET | Refills: 0 | Status: SHIPPED | OUTPATIENT
Start: 2023-06-09

## 2023-06-09 RX ORDER — ONDANSETRON 4 MG/1
4 TABLET, FILM COATED ORAL EVERY 8 HOURS PRN
Qty: 20 TABLET | Refills: 0 | Status: SHIPPED | OUTPATIENT
Start: 2023-06-09

## 2023-06-09 NOTE — PROGRESS NOTES
FAMILY PRACTICE OFFICE VISIT       NAME: Mellisa Covington  AGE: 56 y o  SEX: male       : 1975        MRN: [id number]    DATE: 2023  TIME: 9:09 AM    Assessment and Plan   1  Gastroenteritis  Comments:  Vinay López is stable on exam   With a very normal CT scan just a couple days ago, will hold on repeating right now  Will though, recheck non-fasting labs  Orders:  -     ondansetron (ZOFRAN) 4 mg tablet; Take 1 tablet (4 mg total) by mouth every 8 (eight) hours as needed for nausea or vomiting  -     dicyclomine (BENTYL) 20 mg tablet; Take 1 tablet (20 mg total) by mouth every 6 (six) hours as needed (Abdominal cramping)    2  Epigastric pain  Comments:  As above  Continue Famotidine BID -> refilling PRN Ondansetron/Bentyl  Pt to hydrate well; bland diet - advance slowly  STRICT precautions given  Orders:  -     UA (URINE) with reflex to Scope  -     Urine culture; Future  -     CBC and differential; Future  -     Comprehensive metabolic panel; Future  -     Lipase; Future  -     ondansetron (ZOFRAN) 4 mg tablet; Take 1 tablet (4 mg total) by mouth every 8 (eight) hours as needed for nausea or vomiting  -     dicyclomine (BENTYL) 20 mg tablet; Take 1 tablet (20 mg total) by mouth every 6 (six) hours as needed (Abdominal cramping)    3  Type 2 diabetes mellitus with hyperglycemia, without long-term current use of insulin (HCC)  Comments:  As above  Stay off Metformin for now -> provided Lipase is normal, can take Jardiance and Glucotrol (ONLY if he can eat some though)  4  Primary hypertension  Comments:  Stable on present management  There are no Patient Instructions on file for this visit  Chief Complaint     Chief Complaint   Patient presents with   • Nausea     Patient here with nausea can't eat or drink stomach feels like it's twisted        History of Present Illness   Mellisa Covington is a 56y o -year-old male who presents in f/u today    Vinay López presents again with his wife today  Left lower abd pain continues - becomes more diffuse  Had been to ER 23 -> CT A/P was normal (discussed); Lipase negative  Famotidine given yesterday - has helped some by his report  Review of Systems   Review of Systems   Constitutional: Negative for activity change and fever  Respiratory: Negative for shortness of breath  Cardiovascular: Negative for chest pain  Gastrointestinal: Positive for abdominal pain, nausea and vomiting  Negative for blood in stool and diarrhea  No real BM's - has not eaten much  Genitourinary: Negative for dysuria and hematuria         Active Problem List     Patient Active Problem List   Diagnosis   • Type 2 diabetes mellitus with hyperglycemia, without long-term current use of insulin (HCC)   • Mixed hyperlipidemia   • Recurrent major depressive disorder, in partial remission (HCC)   • Mass of hand, left   • Mass of left hand   • Right leg paresthesias         Past Medical History:  Past Medical History:   Diagnosis Date   • Diabetes mellitus ([facility] [address] )    • Hyperlipidemia        Past Surgical History:  Past Surgical History:   Procedure Laterality Date   • HERNIA REPAIR      as baby   • MASS EXCISION Left 2020    Procedure: left hand mass excision;  Surgeon: Wanda Winters MD;  Location: [facility];  Service: Orthopedics       Family History:  Family History   Problem Relation Age of Onset   • Hypertension Mother    • Diabetes Father        Social History:  Social History     Socioeconomic History   • Marital status: Single     Spouse name: Not on file   • Number of children: Not on file   • Years of education: Not on file   • Highest education level: Not on file   Occupational History   • Not on file   Tobacco Use   • Smoking status: Former     Packs/day: 0 25     Years: 30 00     Total pack years: 7 50     Types: Cigarettes     Quit date: 2022     Years since quittin 5   • Smokeless tobacco: Never   • Tobacco comments:     1 cigarrette Vaping Use   • Vaping Use: Some days   • Substances: Nicotine   Substance and Sexual Activity   • Alcohol use: Yes     Comment: Social   • Drug use: No   • Sexual activity: Yes     Partners: Female   Other Topics Concern   • Not on file   Social History Narrative   • Not on file     Social Determinants of Health     Financial Resource Strain: Not on file   Food Insecurity: Not on file   Transportation Needs: Not on file   Physical Activity: Not on file   Stress: Not on file   Social Connections: Not on file   Intimate Partner Violence: Not on file   Housing Stability: Not on file       Objective     Vitals:    06/09/23 0825   BP: 136/82   Pulse: 91   Resp: 14   Temp: (!) 96 5 °F (35 8 °C)   SpO2: 97%     Wt Readings from Last 3 Encounters:   06/09/23 80 8 kg (178 lb 3 2 oz)   06/05/23 81 5 kg (179 lb 9 6 oz)   05/25/23 84 6 kg (186 lb 9 6 oz)       Physical Exam  Vitals and nursing note reviewed  Constitutional:       General: He is not in acute distress  Appearance: Normal appearance  He is ill-appearing  He is not toxic-appearing or diaphoretic  HENT:      Head: Normocephalic and atraumatic  Mouth/Throat:      Mouth: Mucous membranes are moist       Pharynx: Oropharynx is clear  No oropharyngeal exudate or posterior oropharyngeal erythema  Eyes:      General: No scleral icterus  Conjunctiva/sclera: Conjunctivae normal    Cardiovascular:      Rate and Rhythm: Normal rate and regular rhythm  Heart sounds: Normal heart sounds  No murmur heard  No friction rub  No gallop  Pulmonary:      Effort: Pulmonary effort is normal  No respiratory distress  Breath sounds: Normal breath sounds  No stridor  No wheezing, rhonchi or rales  Abdominal:      General: Abdomen is flat  Bowel sounds are normal  There is no distension  Palpations: There is no mass  Tenderness: There is no guarding or rebound  Musculoskeletal:      Cervical back: Normal range of motion and neck supple  "No rigidity or tenderness  Lymphadenopathy:      Cervical: No cervical adenopathy  Neurological:      Mental Status: He is alert and oriented to person, place, and time  Psychiatric:         Mood and Affect: Mood normal          Behavior: Behavior normal          Thought Content:  Thought content normal          Judgment: Judgment normal          Pertinent Laboratory/Diagnostic Studies:  Lab Results   Component Value Date    BUN 8 06/04/2023    CALCIUM 9 4 06/04/2023    CL 96 06/04/2023    CO2 26 06/04/2023    CREATININE 0 79 06/04/2023    K 3 8 06/04/2023     Lab Results   Component Value Date    ALKPHOS 77 06/04/2023    ALT 15 06/04/2023    AST 12 (L) 06/04/2023       Lab Results   Component Value Date    HCT 44 9 06/04/2023    HGB 15 7 06/04/2023    MCV 92 06/04/2023     06/04/2023    WBC 6 71 06/04/2023       No results found for: \"TSH\"    No results found for: \"CHOL\"  Lab Results   Component Value Date    TRIG 185 (H) 03/31/2022     Lab Results   Component Value Date    HDL 57 03/31/2022     Lab Results   Component Value Date    LDLCALC 109 (H) 03/31/2022     Lab Results   Component Value Date    HGBA1C 13 4 (A) 05/25/2023       Results for orders placed or performed during the hospital encounter of 06/04/23   CBC and differential   Result Value Ref Range    WBC 6 71 4 31 - 10 16 Thousand/uL    RBC 4 90 3 88 - 5 62 Million/uL    Hemoglobin 15 7 12 0 - 17 0 g/dL    Hematocrit 44 9 36 5 - 49 3 %    MCV 92 82 - 98 fL    MCH 32 0 26 8 - 34 3 pg    MCHC 35 0 31 4 - 37 4 g/dL    RDW 11 2 (L) 11 6 - 15 1 %    MPV 9 8 8 9 - 12 7 fL    Platelets 470 534 - 189 Thousands/uL    nRBC 0 /100 WBCs    Neutrophils Relative 58 43 - 75 %    Immat GRANS % 0 0 - 2 %    Lymphocytes Relative 31 14 - 44 %    Monocytes Relative 9 4 - 12 %    Eosinophils Relative 1 0 - 6 %    Basophils Relative 1 0 - 1 %    Neutrophils Absolute 3 88 1 85 - 7 62 Thousands/µL    Immature Grans Absolute 0 02 0 00 - 0 20 Thousand/uL    " Lymphocytes Absolute 2 07 0 60 - 4 47 Thousands/µL    Monocytes Absolute 0 60 0 17 - 1 22 Thousand/µL    Eosinophils Absolute 0 08 0 00 - 0 61 Thousand/µL    Basophils Absolute 0 06 0 00 - 0 10 Thousands/µL   Comprehensive metabolic panel   Result Value Ref Range    Sodium 132 (L) 135 - 147 mmol/L    Potassium 3 8 3 5 - 5 3 mmol/L    Chloride 96 96 - 108 mmol/L    CO2 26 21 - 32 mmol/L    ANION GAP 10 4 - 13 mmol/L    BUN 8 5 - 25 mg/dL    Creatinine 0 79 0 60 - 1 30 mg/dL    Glucose 335 (H) 65 - 140 mg/dL    Calcium 9 4 8 4 - 10 2 mg/dL    AST 12 (L) 13 - 39 U/L    ALT 15 7 - 52 U/L    Alkaline Phosphatase 77 34 - 104 U/L    Total Protein 7 5 6 4 - 8 4 g/dL    Albumin 4 1 3 5 - 5 0 g/dL    Total Bilirubin 0 46 0 20 - 1 00 mg/dL    eGFR 107 ml/min/1 73sq m   Lipase   Result Value Ref Range    Lipase 56 11 - 82 u/L   UA w Reflex to Microscopic w Reflex to Culture    Specimen: Urine, Clean Catch   Result Value Ref Range    Color, UA Light Yellow     Clarity, UA Clear     Specific Gravity, UA >=1 050 (H) 1 003 - 1 030    pH, UA 7 0 4 5, 5 0, 5 5, 6 0, 6 5, 7 0, 7 5, 8 0    Leukocytes, UA (A) Negative     Elevated glucose may cause decreased leukocyte values  See urine microscopic for UWBC result    Nitrite, UA Negative Negative    Protein, UA Negative Negative mg/dl    Glucose, UA >=1000 (1%) (A) Negative mg/dl    Ketones, UA Negative Negative mg/dl    Urobilinogen, UA <2 0 <2 0 mg/dl mg/dl    Bilirubin, UA Negative Negative    Occult Blood, UA Negative Negative   Urine Microscopic   Result Value Ref Range    RBC, UA None Seen None Seen, 1-2 /hpf    WBC, UA None Seen None Seen, 1-2 /hpf    Epithelial Cells None Seen None Seen, Occasional /hpf    Bacteria, UA None Seen None Seen, Occasional /hpf   Fingerstick Glucose (POCT)   Result Value Ref Range    POC Glucose 317 (H) 65 - 140 mg/dl   Green / Black tube on hold   Result Value Ref Range    Extra Tube Hold for add-ons          Orders Placed This Encounter   Procedures • Urine culture   • UA (URINE) with reflex to Scope   • CBC and differential   • Comprehensive metabolic panel   • Lipase       ALLERGIES:  No Known Allergies    Current Medications     Current Outpatient Medications   Medication Sig Dispense Refill   • atorvastatin (LIPITOR) 20 mg tablet Take 1 tablet (20 mg total) by mouth daily 90 tablet 1   • Blood Glucose Monitoring Suppl (OneTouch Verio) w/Device KIT Use to test 1-2x per day 1 kit 1   • dicyclomine (BENTYL) 20 mg tablet Take 1 tablet (20 mg total) by mouth every 6 (six) hours as needed (Abdominal cramping) 20 tablet 0   • escitalopram (LEXAPRO) 10 mg tablet Take 1 tablet (10 mg total) by mouth daily 90 tablet 1   • famotidine (PEPCID) 20 mg tablet Take 1 tablet (20 mg total) by mouth 2 (two) times a day 60 tablet 2   • gabapentin (NEURONTIN) 300 mg capsule Take 1 capsule (300 mg total) by mouth daily at bedtime 90 capsule 1   • glipiZIDE (GLUCOTROL XL) 10 mg 24 hr tablet Take 1 tablet (10 mg total) by mouth daily 90 tablet 1   • glucose blood (OneTouch Verio) test strip Use to test 1-2x daily 50 strip 4   • lisinopril (ZESTRIL) 10 mg tablet Take 1 tablet (10 mg total) by mouth daily 90 tablet 1   • metFORMIN (GLUCOPHAGE-XR) 500 mg 24 hr tablet Take 2 tablets (1,000 mg total) by mouth daily with dinner 180 tablet 1   • ondansetron (ZOFRAN) 4 mg tablet Take 1 tablet (4 mg total) by mouth every 8 (eight) hours as needed for nausea or vomiting 12 tablet 0   • ondansetron (ZOFRAN) 4 mg tablet Take 1 tablet (4 mg total) by mouth every 8 (eight) hours as needed for nausea or vomiting 20 tablet 0   • OneTouch Delica Lancets 73E MISC Use to test 1-2x daily 100 each 3   • tadalafil (CIALIS) 5 MG tablet Take 1 tablet (5 mg total) by mouth daily as needed for erectile dysfunction 10 tablet 5   • Jardiance 25 MG TABS TAKE 1 TABLET BY MOUTH EVERY DAY IN THE MORNING 30 tablet 1   • sitaGLIPtin (Januvia) 100 mg tablet Take 1 tablet (100 mg total) by mouth daily (Patient not taking: Reported on 6/9/2023) 90 tablet 1     No current facility-administered medications for this visit           Health Maintenance     Health Maintenance   Topic Date Due   • Pneumococcal Vaccine: Pediatrics (0 to 5 Years) and At-Risk Patients (6 to 59 Years) (1 - PCV) Never done   • HIV Screening  Never done   • DTaP,Tdap,and Td Vaccines (1 - Tdap) Never done   • Kidney Health Evaluation: Albumin/Creatinine Ratio  07/17/2020   • Colorectal Cancer Screening  Never done   • COVID-19 Vaccine (3 - Pfizer series) 10/29/2021   • DM Eye Exam  10/12/2022   • Influenza Vaccine (Season Ended) 09/01/2023   • Depression Remission PHQ  11/25/2023   • HEMOGLOBIN A1C  11/25/2023   • BMI: Followup Plan  05/25/2024   • Annual Physical  05/25/2024   • Diabetic Foot Exam  05/25/2024   • Kidney Health Evaluation: GFR  06/04/2024   • BMI: Adult  06/09/2024   • Hepatitis C Screening  Completed   • HIB Vaccine  Aged Out   • IPV Vaccine  Aged Out   • Hepatitis A Vaccine  Aged Out   • Meningococcal ACWY Vaccine  Aged Out   • HPV Vaccine  Aged Out     Immunization History   Administered Date(s) Administered   • COVID-19 PFIZER VACCINE 0 3 ML IM 08/13/2021, 09/03/2021          [address] Garrett Marquez DO

## 2023-06-10 LAB — BACTERIA UR CULT: NORMAL

## 2023-06-12 ENCOUNTER — TELEPHONE (OUTPATIENT)
Dept: FAMILY MEDICINE CLINIC | Facility: CLINIC | Age: 48
End: 2023-06-12

## 2023-06-12 NOTE — TELEPHONE ENCOUNTER
Spoke with patient as follow up to clin pharm referral  Patient feeling better over the weekend  He is back at work today, and reports perhaps 75% recovered from his stomach illness  Origin still unknown, reports feeling like insides are being twisted, cramping sensation  He is taking glipizide, jardiance, and [place] and [place]  Holding metformin  Confirmed plan to continue holding metformin at least until PCP appt this Friday 6/16  Moving forward we can discuss changing januvia to glp1 but need stomach issues to fully recover prior to conversion  Will assess cost/adherence at future visits as well  Consider sample CGM in short term or Dexcom PRO session but personal CGM unlikely to be covered without insulin         Pharmacist Tracking Tool  Reason For Outreach: Embedded Pharmacist  Demographics:  Intervention Method: Phone  Type of Intervention: New  Topics Addressed: Diabetes  Pharmacologic Interventions: Prevent or Manage NICKY and Med Rec  Non-Pharmacologic Interventions: Adherence addressed, Care coordination, Home Monitoring and Medication/Device education  Time:  Direct Patient Care: 5 mins  Care Coordination: 15 mins  Recommendation Recipient: Patient/Caregiver  Outcome: Accepted

## 2023-06-13 ENCOUNTER — TELEPHONE (OUTPATIENT)
Dept: FAMILY MEDICINE CLINIC | Facility: CLINIC | Age: 48
End: 2023-06-13

## 2023-06-13 NOTE — TELEPHONE ENCOUNTER
Patients significant other called and was asking if we could bring the patient in to check her blood sugar levels  The patients significant other stated that they are having problems checking his blood sugar at home do the test strips   Please advise

## 2023-06-16 ENCOUNTER — OFFICE VISIT (OUTPATIENT)
Dept: FAMILY MEDICINE CLINIC | Facility: CLINIC | Age: 48
End: 2023-06-16
Payer: COMMERCIAL

## 2023-06-16 VITALS
BODY MASS INDEX: 26.34 KG/M2 | SYSTOLIC BLOOD PRESSURE: 140 MMHG | OXYGEN SATURATION: 98 % | TEMPERATURE: 96.7 F | HEART RATE: 88 BPM | RESPIRATION RATE: 14 BRPM | HEIGHT: 68 IN | DIASTOLIC BLOOD PRESSURE: 86 MMHG | WEIGHT: 173.8 LBS

## 2023-06-16 DIAGNOSIS — E11.65 TYPE 2 DIABETES MELLITUS WITH HYPERGLYCEMIA, WITHOUT LONG-TERM CURRENT USE OF INSULIN (HCC): ICD-10-CM

## 2023-06-16 DIAGNOSIS — Z12.11 COLON CANCER SCREENING: ICD-10-CM

## 2023-06-16 DIAGNOSIS — I10 PRIMARY HYPERTENSION: ICD-10-CM

## 2023-06-16 DIAGNOSIS — R10.13 EPIGASTRIC PAIN: ICD-10-CM

## 2023-06-16 DIAGNOSIS — E78.2 MIXED HYPERLIPIDEMIA: ICD-10-CM

## 2023-06-16 DIAGNOSIS — K52.9 GASTROENTERITIS: Primary | ICD-10-CM

## 2023-06-16 PROCEDURE — 99214 OFFICE O/P EST MOD 30 MIN: CPT | Performed by: FAMILY MEDICINE

## 2023-06-16 RX ORDER — PANTOPRAZOLE SODIUM 40 MG/1
40 TABLET, DELAYED RELEASE ORAL DAILY
Qty: 90 TABLET | Refills: 1 | Status: SHIPPED | OUTPATIENT
Start: 2023-06-16

## 2023-06-16 NOTE — PROGRESS NOTES
"FAMILY PRACTICE OFFICE VISIT       NAME: Jose Whelan  AGE: 56 y o  SEX: male       : 1975        MRN: [id number]    DATE: 2023  TIME: 3:27 PM    Assessment and Plan   1  Gastroenteritis  Comments:  Improving slowly; more of an appetite  Will start Pantoprazole, and refer to GI  Orders:  -     pantoprazole (PROTONIX) 40 mg tablet; Take 1 tablet (40 mg total) by mouth daily  -     Ambulatory Referral to Gastroenterology; Future    2  Epigastric pain  Comments:  As above  Orders:  -     pantoprazole (PROTONIX) 40 mg tablet; Take 1 tablet (40 mg total) by mouth daily  -     Ambulatory Referral to Gastroenterology; Future    3  Type 2 diabetes mellitus with hyperglycemia, without long-term current use of insulin (HCC)  Comments:  John Haus clearly not the issue - will add back to Glucotrol (then Januvia); precautions given  Stay off Metform  Refer to Clin Pharm for Dexcom/Carlos  Orders:  -     Ambulatory referral to clinical pharmacy; Future    4  Primary hypertension  Comments:  Stable on present management  5  Mixed hyperlipidemia  Comments:  Controlled with Atorvastatin  6  Colon cancer screening  -     Ambulatory Referral to Gastroenterology; Future         There are no Patient Instructions on file for this visit  Chief Complaint     Chief Complaint   Patient presents with   • Follow-up     Patient being seen for 1 week follow up        History of Present Illness   Jose Whelan is a 56y o -year-old male who presents today in f/u with his wife on recent GI issues  Metformin was formally stopped at his last OV last week  PRN Ondansetron and Dicyclomine were refilled  Labs done:  Lipase normal, no UTI on Urine Culture, CBC normal, LFTs normal, , A1c down to 11 7% (has not yet started John Haus)  Discomfort is \"going away, but still there  \"    Pt is only taking Glucotrol; has not been taking Jardiance or Januvia        Review of Systems   Review of Systems " Constitutional: Negative for activity change and fever  Respiratory: Negative for shortness of breath  Cardiovascular: Negative for chest pain  Gastrointestinal: Positive for abdominal pain  Negative for diarrhea and nausea  Improved; able to go to work this week  Is able to eat now  Still there - worse in the evenings         Active Problem List     Patient Active Problem List   Diagnosis   • Type 2 diabetes mellitus with hyperglycemia, without long-term current use of insulin (HCC)   • Mixed hyperlipidemia   • Recurrent major depressive disorder, in partial remission (HCC)   • Mass of hand, left   • Mass of left hand   • Right leg paresthesias         Past Medical History:  Past Medical History:   Diagnosis Date   • Diabetes mellitus ([facility] [address] )    • Hyperlipidemia        Past Surgical History:  Past Surgical History:   Procedure Laterality Date   • HERNIA REPAIR      as baby   • MASS EXCISION Left 2020    Procedure: left hand mass excision;  Surgeon: Sathish Bates MD;  Location: [facility];  Service: Orthopedics       Family History:  Family History   Problem Relation Age of Onset   • Hypertension Mother    • Diabetes Father        Social History:  Social History     Socioeconomic History   • Marital status: Single     Spouse name: Not on file   • Number of children: Not on file   • Years of education: Not on file   • Highest education level: Not on file   Occupational History   • Not on file   Tobacco Use   • Smoking status: Former     Packs/day: 0 25     Years: 30 00     Total pack years: 7 50     Types: Cigarettes     Quit date: 2022     Years since quittin 5   • Smokeless tobacco: Never   • Tobacco comments:     1 cigarrette    Vaping Use   • Vaping Use: Some days   • Substances: Nicotine   Substance and Sexual Activity   • Alcohol use: Yes     Comment: Social   • Drug use: No   • Sexual activity: Yes     Partners: Female   Other Topics Concern   • Not on file   Social History Narrative   • Not on file     Social Determinants of Health     Financial Resource Strain: Not on file   Food Insecurity: Not on file   Transportation Needs: Not on file   Physical Activity: Not on file   Stress: Not on file   Social Connections: Not on file   Intimate Partner Violence: Not on file   Housing Stability: Not on file       Objective     Vitals:    06/16/23 1454   BP: 140/86   Pulse: 88   Resp: 14   Temp: (!) 96 7 °F (35 9 °C)   SpO2: 98%     Wt Readings from Last 3 Encounters:   06/16/23 78 8 kg (173 lb 12 8 oz)   06/09/23 80 8 kg (178 lb 3 2 oz)   06/05/23 81 5 kg (179 lb 9 6 oz)       Physical Exam  Vitals and nursing note reviewed  Constitutional:       General: He is not in acute distress  Appearance: Normal appearance  He is not ill-appearing, toxic-appearing or diaphoretic  HENT:      Head: Normocephalic and atraumatic  Eyes:      General: No scleral icterus  Conjunctiva/sclera: Conjunctivae normal    Cardiovascular:      Rate and Rhythm: Normal rate and regular rhythm  Heart sounds: Normal heart sounds  No murmur heard  No friction rub  No gallop  Pulmonary:      Effort: Pulmonary effort is normal  No respiratory distress  Breath sounds: Normal breath sounds  No stridor  No wheezing, rhonchi or rales  Abdominal:      General: Abdomen is flat  Bowel sounds are normal  There is no distension  Palpations: Abdomen is soft  There is no mass  Tenderness: There is no abdominal tenderness  There is no guarding or rebound  Musculoskeletal:      Cervical back: Normal range of motion and neck supple  No rigidity or tenderness  Lymphadenopathy:      Cervical: No cervical adenopathy  Neurological:      Mental Status: He is alert and oriented to person, place, and time  Psychiatric:         Mood and Affect: Mood normal          Behavior: Behavior normal          Thought Content:  Thought content normal          Judgment: Judgment normal          Pertinent "Laboratory/Diagnostic Studies:  Lab Results   Component Value Date    BUN 9 06/09/2023    CREATININE 0 84 06/09/2023    CALCIUM 9 3 06/09/2023    K 4 0 06/09/2023    CO2 29 06/09/2023    CL 96 06/09/2023     Lab Results   Component Value Date    ALT 15 06/09/2023    AST 11 (L) 06/09/2023    ALKPHOS 69 06/09/2023       Lab Results   Component Value Date    WBC 6 26 06/09/2023    HGB 16 3 06/09/2023    HCT 45 6 06/09/2023    MCV 90 06/09/2023     06/09/2023       No results found for: \"TSH\"    No results found for: \"CHOL\"  Lab Results   Component Value Date    TRIG 184 (H) 06/09/2023     Lab Results   Component Value Date    HDL 51 06/09/2023     Lab Results   Component Value Date    LDLCALC 71 06/09/2023     Lab Results   Component Value Date    HGBA1C 11 7 (H) 06/09/2023       Results for orders placed or performed in visit on 06/09/23   Urine culture    Specimen: Urine, Clean Catch   Result Value Ref Range    Urine Culture 40,000-49,000 cfu/ml    HEMOGLOBIN A1C W/ EAG ESTIMATION   Result Value Ref Range    Hemoglobin A1C 11 7 (H) Normal 3 8-5 6%; PreDiabetic 5 7-6 4%;  Diabetic >=6 5%; Glycemic control for adults with diabetes <7 0% %     mg/dl   Comprehensive metabolic panel   Result Value Ref Range    Sodium 135 135 - 147 mmol/L    Potassium 4 0 3 5 - 5 3 mmol/L    Chloride 96 96 - 108 mmol/L    CO2 29 21 - 32 mmol/L    ANION GAP 10 4 - 13 mmol/L    BUN 9 5 - 25 mg/dL    Creatinine 0 84 0 60 - 1 30 mg/dL    Glucose 286 (H) 65 - 140 mg/dL    Calcium 9 3 8 4 - 10 2 mg/dL    AST 11 (L) 13 - 39 U/L    ALT 15 7 - 52 U/L    Alkaline Phosphatase 69 34 - 104 U/L    Total Protein 7 3 6 4 - 8 4 g/dL    Albumin 4 1 3 5 - 5 0 g/dL    Total Bilirubin 0 40 0 20 - 1 00 mg/dL    eGFR 105 ml/min/1 73sq m   Lipid Panel with Direct LDL reflex   Result Value Ref Range    Cholesterol 159 See Comment mg/dL    Triglycerides 184 (H) See Comment mg/dL    HDL, Direct 51 >=40 mg/dL    LDL Calculated 71 0 - 100 mg/dL   CBC and " differential   Result Value Ref Range    WBC 6 26 4 31 - 10 16 Thousand/uL    RBC 5 08 3 88 - 5 62 Million/uL    Hemoglobin 16 3 12 0 - 17 0 g/dL    Hematocrit 45 6 36 5 - 49 3 %    MCV 90 82 - 98 fL    MCH 32 1 26 8 - 34 3 pg    MCHC 35 7 31 4 - 37 4 g/dL    RDW 11 4 (L) 11 6 - 15 1 %    MPV 9 6 8 9 - 12 7 fL    Platelets 844 147 - 266 Thousands/uL    nRBC 0 /100 WBCs    Neutrophils Relative 61 43 - 75 %    Immat GRANS % 0 0 - 2 %    Lymphocytes Relative 28 14 - 44 %    Monocytes Relative 9 4 - 12 %    Eosinophils Relative 1 0 - 6 %    Basophils Relative 1 0 - 1 %    Neutrophils Absolute 3 76 1 85 - 7 62 Thousands/µL    Immature Grans Absolute 0 02 0 00 - 0 20 Thousand/uL    Lymphocytes Absolute 1 78 0 60 - 4 47 Thousands/µL    Monocytes Absolute 0 53 0 17 - 1 22 Thousand/µL    Eosinophils Absolute 0 09 0 00 - 0 61 Thousand/µL    Basophils Absolute 0 08 0 00 - 0 10 Thousands/µL   TSH, 3rd generation with Free T4 reflex   Result Value Ref Range    TSH 3RD GENERATON 0 929 0 450 - 4 500 uIU/mL   Albumin / creatinine urine ratio   Result Value Ref Range    Creatinine, Ur 286 0 mg/dL    Albumin,U,Random 24 9 (H) 0 0 - 20 0 mg/L    Albumin Creat Ratio 9 0 - 30 mg/g creatinine   Lipase   Result Value Ref Range    Lipase 53 11 - 82 u/L       Orders Placed This Encounter   Procedures   • Ambulatory Referral to Gastroenterology   • Ambulatory referral to clinical pharmacy       ALLERGIES:  No Known Allergies    Current Medications     Current Outpatient Medications   Medication Sig Dispense Refill   • atorvastatin (LIPITOR) 20 mg tablet Take 1 tablet (20 mg total) by mouth daily 90 tablet 1   • Blood Glucose Monitoring Suppl (OneTouch Verio) w/Device KIT Use to test 1-2x per day 1 kit 1   • dicyclomine (BENTYL) 20 mg tablet Take 1 tablet (20 mg total) by mouth every 6 (six) hours as needed (Abdominal cramping) 20 tablet 0   • escitalopram (LEXAPRO) 10 mg tablet Take 1 tablet (10 mg total) by mouth daily 90 tablet 1   • famotidine (PEPCID) 20 mg tablet Take 1 tablet (20 mg total) by mouth 2 (two) times a day 60 tablet 2   • gabapentin (NEURONTIN) 300 mg capsule Take 1 capsule (300 mg total) by mouth daily at bedtime 90 capsule 1   • glipiZIDE (GLUCOTROL XL) 10 mg 24 hr tablet Take 1 tablet (10 mg total) by mouth daily 90 tablet 1   • glucose blood (OneTouch Verio) test strip Use to test 1-2x daily 50 strip 4   • lisinopril (ZESTRIL) 10 mg tablet Take 1 tablet (10 mg total) by mouth daily 90 tablet 1   • metFORMIN (GLUCOPHAGE-XR) 500 mg 24 hr tablet Take 2 tablets (1,000 mg total) by mouth daily with dinner 180 tablet 1   • ondansetron (ZOFRAN) 4 mg tablet Take 1 tablet (4 mg total) by mouth every 8 (eight) hours as needed for nausea or vomiting 12 tablet 0   • ondansetron (ZOFRAN) 4 mg tablet Take 1 tablet (4 mg total) by mouth every 8 (eight) hours as needed for nausea or vomiting 20 tablet 0   • OneTouch Delica Lancets 63A MISC Use to test 1-2x daily 100 each 3   • pantoprazole (PROTONIX) 40 mg tablet Take 1 tablet (40 mg total) by mouth daily 90 tablet 1   • tadalafil (CIALIS) 5 MG tablet Take 1 tablet (5 mg total) by mouth daily as needed for erectile dysfunction 10 tablet 5   • Jardiance 25 MG TABS TAKE 1 TABLET BY MOUTH EVERY DAY IN THE MORNING (Patient not taking: Reported on 6/16/2023) 30 tablet 1   • sitaGLIPtin (Januvia) 100 mg tablet Take 1 tablet (100 mg total) by mouth daily (Patient not taking: Reported on 6/9/2023) 90 tablet 1     No current facility-administered medications for this visit           Health Maintenance     Health Maintenance   Topic Date Due   • Pneumococcal Vaccine: Pediatrics (0 to 5 Years) and At-Risk Patients (6 to 59 Years) (1 - PCV) Never done   • HIV Screening  Never done   • DTaP,Tdap,and Td Vaccines (1 - Tdap) Never done   • Colorectal Cancer Screening  Never done   • COVID-19 Vaccine (3 - Pfizer series) 10/29/2021   • DM Eye Exam  10/12/2022   • Influenza Vaccine (Season Ended) 09/01/2023   • Depression Remission PHQ  11/25/2023   • HEMOGLOBIN A1C  12/09/2023   • BMI: Followup Plan  05/25/2024   • Annual Physical  05/25/2024   • Diabetic Foot Exam  05/25/2024   • Kidney Health Evaluation: GFR  06/09/2024   • Kidney Health Evaluation: Albumin/Creatinine Ratio  06/09/2024   • BMI: Adult  06/16/2024   • Hepatitis C Screening  Completed   • HIB Vaccine  Aged Out   • IPV Vaccine  Aged Out   • Hepatitis A Vaccine  Aged Out   • Meningococcal ACWY Vaccine  Aged Out   • HPV Vaccine  Aged Out     Immunization History   Administered Date(s) Administered   • COVID-19 PFIZER VACCINE 0 3 ML IM 08/13/2021, 09/03/2021          [address] Marquez, DO

## 2023-06-20 ENCOUNTER — OFFICE VISIT (OUTPATIENT)
Dept: GASTROENTEROLOGY | Facility: CLINIC | Age: 48
End: 2023-06-20
Payer: COMMERCIAL

## 2023-06-20 VITALS
DIASTOLIC BLOOD PRESSURE: 82 MMHG | HEART RATE: 95 BPM | SYSTOLIC BLOOD PRESSURE: 126 MMHG | WEIGHT: 180 LBS | BODY MASS INDEX: 28.25 KG/M2 | HEIGHT: 67 IN

## 2023-06-20 DIAGNOSIS — Z12.11 COLON CANCER SCREENING: ICD-10-CM

## 2023-06-20 DIAGNOSIS — R10.30 LOWER ABDOMINAL PAIN: Primary | ICD-10-CM

## 2023-06-20 DIAGNOSIS — K52.9 GASTROENTERITIS: ICD-10-CM

## 2023-06-20 DIAGNOSIS — R10.13 EPIGASTRIC PAIN: ICD-10-CM

## 2023-06-20 DIAGNOSIS — R63.4 ABNORMAL WEIGHT LOSS: ICD-10-CM

## 2023-06-20 PROCEDURE — 99204 OFFICE O/P NEW MOD 45 MIN: CPT | Performed by: PHYSICIAN ASSISTANT

## 2023-06-20 NOTE — PATIENT INSTRUCTIONS
Scheduled date of EGD/colonoscopy (as of today): 7/17/23  Physician performing EGD/colonoscopy: Dr Carrie Huber   Location of EGD/colonoscopy: [facility]   Desired bowel prep reviewed with patient: Clenpiq  Instructions reviewed with patient by: Qian Wheat  Clearances: diabetic

## 2023-06-20 NOTE — H&P (VIEW-ONLY)
[facility] - Outpatient Consultation  Marney Cockayne 56 y.o. male MRN: [id number]  Encounter: [id number]          ASSESSMENT AND PLAN:      1. Recent onset of intermittent lower abdominal pain, also nausea and epigastric discomfort which have been helped somewhat with starting pantoprazole, Pepcid and Bentyl. Also associated with unintentional weight loss of 10 pounds. Acuity of symptom onset suggests an acute infectious gastroenteritis and/or foodborne illness, could also possibly be related to starting back on diabetic medications which patient temporally correlates with symptom onset.   He has also never had endoscopic evaluations; would be due for colorectal cancer screening in any case    -We will schedule patient for EGD and colonoscopy    -Procedures were explained in detail to the patient at this time including associated risks and benefits, risks including but not limited to infection, perforation and bleeding    -Prescription and instructions provided for colonoscopy prep    -May continue with Protonix and Pepcid for the meantime; may make further decisions about continuing or modifying PPI therapy pending patient's clinical course and results of the EGD    -May also continue to use Bentyl, although I advised him that if he continues to symptomatically improve he does not need to take it on a regularly scheduled basis anymore, he could use it as needed for abdominal cramping or stool urgency/diarrhea    -I also suggested he minimize dairy intake as he may be sensitive to this if there is indeed an element of postinfectious IBS to his current symptomatology    -Continue follow-up with the rest of his medical team with regards to managing his underlying diabetes       ______________________________________________________________________    HPI: 72-year-old male with history of poorly controlled diabetes, hyperlipidemia who presents for evaluation; he was seen in the emergency room earlier this month with acute onset of nausea vomiting and abdominal pain, which he temporally correlated with being started back on diabetic medications including metformin, after having been off of them for about a year. Recent hemoglobin A1c appears to be 11.7%. In the emergency room he had a CT scan done with contrast showing no evidence of any acute pathology, his liver enzymes appeared normal as did his lipase, he denies any history of abdominal surgeries. He was started on pantoprazole and Pepcid as well as Bentyl which she is taking on a standing dose, he says that he notes his symptoms have been generally improving gradually, he is able to tolerate food again although he did have to take off of work for about a week and was unable to keep food down for a while reliably, he says he lost about 10 pounds over the last 2 weeks but is just started to gain back some of the weight. He still has lingering issues with lower abdominal cramping which is intermittent but frequent. He says that he does start to feel little better about 30 minutes after taking his stomach medications. He has never had an EGD or colonoscopy before. REVIEW OF SYSTEMS:    CONSTITUTIONAL: Denies any fever, chills, rigors, and weight loss. HEENT: No earache or tinnitus. Denies hearing loss or visual disturbances. CARDIOVASCULAR: No chest pain or palpitations. RESPIRATORY: Denies any cough, hemoptysis, shortness of breath or dyspnea on exertion. GASTROINTESTINAL: As noted in the History of Present Illness. GENITOURINARY: No problems with urination. Denies any hematuria or dysuria. NEUROLOGIC: No dizziness or vertigo, denies headaches. MUSCULOSKELETAL: Denies any muscle or joint pain. SKIN: Denies skin rashes or itching. ENDOCRINE: Denies excessive thirst. Denies intolerance to heat or cold. PSYCHOSOCIAL: Denies depression or anxiety. Denies any recent memory loss.        Historical Information   Past Medical History:   Diagnosis Date   • Diabetes mellitus ([address])    • Hyperlipidemia      Past Surgical History:   Procedure Laterality Date   • HERNIA REPAIR      as baby   • MASS EXCISION Left 2020    Procedure: left hand mass excision;  Surgeon: Gonzalo Le MD;  Location: [facility];  Service: Orthopedics     Social History   Social History     Substance and Sexual Activity   Alcohol Use Yes    Comment: Social     Social History     Substance and Sexual Activity   Drug Use No     Social History     Tobacco Use   Smoking Status Former   • Packs/day: 0.25   • Years: 30.00   • Total pack years: 7.50   • Types: Cigarettes   • Quit date: 2022   • Years since quittin.5   Smokeless Tobacco Never   Tobacco Comments    1 cigarrette      Family History   Problem Relation Age of Onset   • Hypertension Mother    • Diabetes Father        Meds/Allergies       Current Outpatient Medications:   •  atorvastatin (LIPITOR) 20 mg tablet  •  dicyclomine (BENTYL) 20 mg tablet  •  escitalopram (LEXAPRO) 10 mg tablet  •  famotidine (PEPCID) 20 mg tablet  •  gabapentin (NEURONTIN) 300 mg capsule  •  glipiZIDE (GLUCOTROL XL) 10 mg 24 hr tablet  •  lisinopril (ZESTRIL) 10 mg tablet  •  ondansetron (ZOFRAN) 4 mg tablet  •  ondansetron (ZOFRAN) 4 mg tablet  •  pantoprazole (PROTONIX) 40 mg tablet  •  sodium picosulfate, magnesium oxide, citric acid (Clenpiq) oral solution  •  tadalafil (CIALIS) 5 MG tablet  •  Blood Glucose Monitoring Suppl (OneTouch Verio) w/Device KIT  •  glucose blood (OneTouch Verio) test strip  •  Jardiance 25 MG TABS  •  metFORMIN (GLUCOPHAGE-XR) 500 mg 24 hr tablet  •  OneTouch Delica Lancets E MISC  •  sitaGLIPtin (Januvia) 100 mg tablet    No Known Allergies        Objective     Blood pressure 126/82, pulse 95, height 5' 7" (1.702 m), weight 81.6 kg (180 lb). Body mass index is 28.19 kg/m².         PHYSICAL EXAM:      General Appearance:   Alert, cooperative, no distress   HEENT:   Normocephalic, atraumatic, anicteric.     Neck:  Supple, symmetrical, trachea midline   Lungs:   Clear to auscultation bilaterally; no rales, rhonchi or wheezing; respirations unlabored    Heart[de-identified]   Regular rate and rhythm; no murmur, rub, or gallop. Abdomen:   Soft, non-tender, non-distended; normal bowel sounds; no masses, no organomegaly    Genitalia:   Deferred    Rectal:   Deferred    Extremities:  No cyanosis, clubbing or edema    Pulses:  2+ and symmetric    Skin:  No jaundice, rashes, or lesions    Lymph nodes:  No palpable cervical lymphadenopathy        Lab Results:   No visits with results within 1 Day(s) from this visit.    Latest known visit with results is:   Appointment on 06/09/2023   Component Date Value   • Hemoglobin A1C 06/09/2023 11.7 (H)    • EAG 06/09/2023 289    • Sodium 06/09/2023 135    • Potassium 06/09/2023 4.0    • Chloride 06/09/2023 96    • CO2 06/09/2023 29    • ANION GAP 06/09/2023 10    • BUN 06/09/2023 9    • Creatinine 06/09/2023 0.84    • Glucose 06/09/2023 286 (H)    • Calcium 06/09/2023 9.3    • AST 06/09/2023 11 (L)    • ALT 06/09/2023 15    • Alkaline Phosphatase 06/09/2023 69    • Total Protein 06/09/2023 7.3    • Albumin 06/09/2023 4.1    • Total Bilirubin 06/09/2023 0.40    • eGFR 06/09/2023 105    • Cholesterol 06/09/2023 159    • Triglycerides 06/09/2023 184 (H)    • HDL, Direct 06/09/2023 51    • LDL Calculated 06/09/2023 71    • WBC 06/09/2023 6.26    • RBC 06/09/2023 5.08    • Hemoglobin 06/09/2023 16.3    • Hematocrit 06/09/2023 45.6    • MCV 06/09/2023 90    • MCH 06/09/2023 32.1    • MCHC 06/09/2023 35.7    • RDW 06/09/2023 11.4 (L)    • MPV 06/09/2023 9.6    • Platelets 70/21/5459 321    • nRBC 06/09/2023 0    • Neutrophils Relative 06/09/2023 61    • Immat GRANS % 06/09/2023 0    • Lymphocytes Relative 06/09/2023 28    • Monocytes Relative 06/09/2023 9    • Eosinophils Relative 06/09/2023 1    • Basophils Relative 06/09/2023 1    • Neutrophils Absolute 06/09/2023 3.76    • Immature Grans Absolute 06/09/2023 0.02    • Lymphocytes Absolute 06/09/2023 1.78    • Monocytes Absolute 06/09/2023 0.53    • Eosinophils Absolute 06/09/2023 0.09    • Basophils Absolute 06/09/2023 0.08    • TSH 3RD GENERATON 06/09/2023 0.929    • Creatinine, Ur 06/09/2023 286.0    • Albumin,U,Random 06/09/2023 24.9 (H)    • Albumin Creat Ratio 06/09/2023 9    • Urine Culture 06/09/2023 40,000-49,000 cfu/ml    • Lipase 06/09/2023 53          Radiology Results:   CT abdomen pelvis with contrast    Result Date: 6/4/2023  Narrative: CT ABDOMEN AND PELVIS WITH IV CONTRAST INDICATION:   Abdominal pain, acute, nonlocalized periumbilical pain. COMPARISON:  None. TECHNIQUE:  CT examination of the abdomen and pelvis was performed. Multiplanar 2D reformatted images were created from the source data. This examination, like all CT scans performed in the [facility], was performed utilizing techniques to minimize radiation dose exposure, including the use of iterative reconstruction and automated exposure control. Radiation dose length product (DLP) for this visit:  732 mGy-cm IV Contrast:  100 mL of iohexol (OMNIPAQUE) Enteric Contrast:  Enteric contrast was not administered. FINDINGS: ABDOMEN LOWER CHEST:  No clinically significant abnormality identified in the visualized lower chest. LIVER/BILIARY TREE:  Unremarkable. GALLBLADDER:  No calcified gallstones. No pericholecystic inflammatory change. SPLEEN:  Unremarkable. PANCREAS:  Unremarkable. ADRENAL GLANDS:  Unremarkable. KIDNEYS/URETERS:  Unremarkable. No hydronephrosis. STOMACH AND BOWEL:  Unremarkable. APPENDIX: A normal appendix was visualized. ABDOMINOPELVIC CAVITY:  No ascites. No pneumoperitoneum. No lymphadenopathy. VESSELS: No abdominal aortic aneurysm. 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 PELVIS REPRODUCTIVE ORGANS:  Unremarkable for patient's age. URINARY BLADDER:  Unremarkable. ABDOMINAL WALL/INGUINAL REGIONS:  Unremarkable. OSSEOUS STRUCTURES:  No acute fracture or destructive osseous lesion. Synovial herniation pit in the left femoral neck. Mild bilateral hip osteoarthritis with subchondral sclerosis and subchondral cysts. Impression: 1. No acute process in the abdomen and pelvis identified. 2. Mild bilateral hip osteoarthritis. 3. Transposition of the inferior vena cava.  Workstation performed: [id number]

## 2023-06-20 NOTE — PROGRESS NOTES
[facility] - Outpatient Consultation  Estephanie Thomas 56 y o  male MRN: [id number]  Encounter: [id number]          ASSESSMENT AND PLAN:      1  Recent onset of intermittent lower abdominal pain, also nausea and epigastric discomfort which have been helped somewhat with starting pantoprazole, Pepcid and Bentyl  Also associated with unintentional weight loss of 10 pounds  Acuity of symptom onset suggests an acute infectious gastroenteritis and/or foodborne illness, could also possibly be related to starting back on diabetic medications which patient temporally correlates with symptom onset    He has also never had endoscopic evaluations; would be due for colorectal cancer screening in any case    -We will schedule patient for EGD and colonoscopy    -Procedures were explained in detail to the patient at this time including associated risks and benefits, risks including but not limited to infection, perforation and bleeding    -Prescription and instructions provided for colonoscopy prep    -May continue with Protonix and Pepcid for the meantime; may make further decisions about continuing or modifying PPI therapy pending patient's clinical course and results of the EGD    -May also continue to use Bentyl, although I advised him that if he continues to symptomatically improve he does not need to take it on a regularly scheduled basis anymore, he could use it as needed for abdominal cramping or stool urgency/diarrhea    -I also suggested he minimize dairy intake as he may be sensitive to this if there is indeed an element of postinfectious IBS to his current symptomatology    -Continue follow-up with the rest of his medical team with regards to managing his underlying diabetes       ______________________________________________________________________    HPI: 15-year-old male with history of poorly controlled diabetes, hyperlipidemia who presents for evaluation; he was seen in the emergency room earlier this month with acute onset of nausea vomiting and abdominal pain, which he temporally correlated with being started back on diabetic medications including metformin, after having been off of them for about a year  Recent hemoglobin A1c appears to be 11 7%  In the emergency room he had a CT scan done with contrast showing no evidence of any acute pathology, his liver enzymes appeared normal as did his lipase, he denies any history of abdominal surgeries  He was started on pantoprazole and Pepcid as well as Bentyl which she is taking on a standing dose, he says that he notes his symptoms have been generally improving gradually, he is able to tolerate food again although he did have to take off of work for about a week and was unable to keep food down for a while reliably, he says he lost about 10 pounds over the last 2 weeks but is just started to gain back some of the weight  He still has lingering issues with lower abdominal cramping which is intermittent but frequent  He says that he does start to feel little better about 30 minutes after taking his stomach medications  He has never had an EGD or colonoscopy before  REVIEW OF SYSTEMS:    CONSTITUTIONAL: Denies any fever, chills, rigors, and weight loss  HEENT: No earache or tinnitus  Denies hearing loss or visual disturbances  CARDIOVASCULAR: No chest pain or palpitations  RESPIRATORY: Denies any cough, hemoptysis, shortness of breath or dyspnea on exertion  GASTROINTESTINAL: As noted in the History of Present Illness  GENITOURINARY: No problems with urination  Denies any hematuria or dysuria  NEUROLOGIC: No dizziness or vertigo, denies headaches  MUSCULOSKELETAL: Denies any muscle or joint pain  SKIN: Denies skin rashes or itching  ENDOCRINE: Denies excessive thirst  Denies intolerance to heat or cold  PSYCHOSOCIAL: Denies depression or anxiety  Denies any recent memory loss         Historical Information   Past "Medical History:   Diagnosis Date   • Diabetes mellitus ([address] )    • Hyperlipidemia      Past Surgical History:   Procedure Laterality Date   • HERNIA REPAIR      as baby   • MASS EXCISION Left 2020    Procedure: left hand mass excision;  Surgeon: Daniel Mckinney MD;  Location: [facility];  Service: Orthopedics     Social History   Social History     Substance and Sexual Activity   Alcohol Use Yes    Comment: Social     Social History     Substance and Sexual Activity   Drug Use No     Social History     Tobacco Use   Smoking Status Former   • Packs/day: 0 25   • Years: 30 00   • Total pack years: 7 50   • Types: Cigarettes   • Quit date: 2022   • Years since quittin 5   Smokeless Tobacco Never   Tobacco Comments    1 cigarrette      Family History   Problem Relation Age of Onset   • Hypertension Mother    • Diabetes Father        Meds/Allergies       Current Outpatient Medications:   •  atorvastatin (LIPITOR) 20 mg tablet  •  dicyclomine (BENTYL) 20 mg tablet  •  escitalopram (LEXAPRO) 10 mg tablet  •  famotidine (PEPCID) 20 mg tablet  •  gabapentin (NEURONTIN) 300 mg capsule  •  glipiZIDE (GLUCOTROL XL) 10 mg 24 hr tablet  •  lisinopril (ZESTRIL) 10 mg tablet  •  ondansetron (ZOFRAN) 4 mg tablet  •  ondansetron (ZOFRAN) 4 mg tablet  •  pantoprazole (PROTONIX) 40 mg tablet  •  sodium picosulfate, magnesium oxide, citric acid (Clenpiq) oral solution  •  tadalafil (CIALIS) 5 MG tablet  •  Blood Glucose Monitoring Suppl (OneTouch Verio) w/Device KIT  •  glucose blood (OneTouch Verio) test strip  •  Jardiance 25 MG TABS  •  metFORMIN (GLUCOPHAGE-XR) 500 mg 24 hr tablet  •  OneTouch Delica Lancets 05Z MISC  •  sitaGLIPtin (Januvia) 100 mg tablet    No Known Allergies        Objective     Blood pressure 126/82, pulse 95, height 5' 7\" (1 702 m), weight 81 6 kg (180 lb)  Body mass index is 28 19 kg/m²          PHYSICAL EXAM:      General Appearance:   Alert, cooperative, no distress   HEENT:   Normocephalic, " atraumatic, anicteric      Neck:  Supple, symmetrical, trachea midline   Lungs:   Clear to auscultation bilaterally; no rales, rhonchi or wheezing; respirations unlabored    Heart[de-identified]   Regular rate and rhythm; no murmur, rub, or gallop  Abdomen:   Soft, non-tender, non-distended; normal bowel sounds; no masses, no organomegaly    Genitalia:   Deferred    Rectal:   Deferred    Extremities:  No cyanosis, clubbing or edema    Pulses:  2+ and symmetric    Skin:  No jaundice, rashes, or lesions    Lymph nodes:  No palpable cervical lymphadenopathy        Lab Results:   No visits with results within 1 Day(s) from this visit     Latest known visit with results is:   Appointment on 06/09/2023   Component Date Value   • Hemoglobin A1C 06/09/2023 11 7 (H)    • EAG 06/09/2023 289    • Sodium 06/09/2023 135    • Potassium 06/09/2023 4 0    • Chloride 06/09/2023 96    • CO2 06/09/2023 29    • ANION GAP 06/09/2023 10    • BUN 06/09/2023 9    • Creatinine 06/09/2023 0 84    • Glucose 06/09/2023 286 (H)    • Calcium 06/09/2023 9 3    • AST 06/09/2023 11 (L)    • ALT 06/09/2023 15    • Alkaline Phosphatase 06/09/2023 69    • Total Protein 06/09/2023 7 3    • Albumin 06/09/2023 4 1    • Total Bilirubin 06/09/2023 0 40    • eGFR 06/09/2023 105    • Cholesterol 06/09/2023 159    • Triglycerides 06/09/2023 184 (H)    • HDL, Direct 06/09/2023 51    • LDL Calculated 06/09/2023 71    • WBC 06/09/2023 6 26    • RBC 06/09/2023 5 08    • Hemoglobin 06/09/2023 16 3    • Hematocrit 06/09/2023 45 6    • MCV 06/09/2023 90    • MCH 06/09/2023 32 1    • MCHC 06/09/2023 35 7    • RDW 06/09/2023 11 4 (L)    • MPV 06/09/2023 9 6    • Platelets 54/65/2815 321    • nRBC 06/09/2023 0    • Neutrophils Relative 06/09/2023 61    • Immat GRANS % 06/09/2023 0    • Lymphocytes Relative 06/09/2023 28    • Monocytes Relative 06/09/2023 9    • Eosinophils Relative 06/09/2023 1    • Basophils Relative 06/09/2023 1    • Neutrophils Absolute 06/09/2023 3 76    • Immature Grans Absolute 06/09/2023 0 02    • Lymphocytes Absolute 06/09/2023 1 78    • Monocytes Absolute 06/09/2023 0 53    • Eosinophils Absolute 06/09/2023 0 09    • Basophils Absolute 06/09/2023 0 08    • TSH 3RD GENERATON 06/09/2023 0 929    • Creatinine, Ur 06/09/2023 286 0    • Albumin,U,Random 06/09/2023 24 9 (H)    • Albumin Creat Ratio 06/09/2023 9    • Urine Culture 06/09/2023 40,000-49,000 cfu/ml    • Lipase 06/09/2023 53          Radiology Results:   CT abdomen pelvis with contrast    Result Date: 6/4/2023  Narrative: CT ABDOMEN AND PELVIS WITH IV CONTRAST INDICATION:   Abdominal pain, acute, nonlocalized periumbilical pain  COMPARISON:  None  TECHNIQUE:  CT examination of the abdomen and pelvis was performed  Multiplanar 2D reformatted images were created from the source data  This examination, like all CT scans performed in the [facility], was performed utilizing techniques to minimize radiation dose exposure, including the use of iterative reconstruction and automated exposure control  Radiation dose length product (DLP) for this visit:  732 mGy-cm IV Contrast:  100 mL of iohexol (OMNIPAQUE) Enteric Contrast:  Enteric contrast was not administered  FINDINGS: ABDOMEN LOWER CHEST:  No clinically significant abnormality identified in the visualized lower chest  LIVER/BILIARY TREE:  Unremarkable  GALLBLADDER:  No calcified gallstones  No pericholecystic inflammatory change  SPLEEN:  Unremarkable  PANCREAS:  Unremarkable  ADRENAL GLANDS:  Unremarkable  KIDNEYS/URETERS:  Unremarkable  No hydronephrosis  STOMACH AND BOWEL:  Unremarkable  APPENDIX: A normal appendix was visualized  ABDOMINOPELVIC CAVITY:  No ascites  No pneumoperitoneum  No lymphadenopathy  VESSELS: No abdominal aortic aneurysm  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

## 2023-06-21 ENCOUNTER — TELEPHONE (OUTPATIENT)
Dept: FAMILY MEDICINE CLINIC | Facility: CLINIC | Age: 48
End: 2023-06-21

## 2023-06-21 NOTE — TELEPHONE ENCOUNTER
As a follow-up, a second attempt has been made for outreach via fax to facility. Please see Contacts section for details.     Thank you  Iwona Yu

## 2023-06-21 NOTE — TELEPHONE ENCOUNTER
Reviewed chart and LM with plan for patient  Per PCP note should be taking jardiance, januvia, and glipizide now  No metformin  Option to add insulin but prior to changes would recommend a sample CGM to evaluate target areas  Not covered for long term use by patient's insurance  Requested return call to schedule in person appt for CGM       Pharmacist Tracking Tool  Reason For Outreach: Embedded Pharmacist  Demographics:  Intervention Method: Phone  Type of Intervention: Follow-Up  Topics Addressed: Dermatology  Pharmacologic Interventions: Med Rec  Non-Pharmacologic Interventions: Disease state education, Home Monitoring, Medication/Device education and Personal CGM  Time:  Direct Patient Care: 5 mins  Care Coordination: 15 mins  Recommendation Recipient: Patient/Caregiver  Outcome: Accepted

## 2023-06-28 DIAGNOSIS — K52.9 GASTROENTERITIS: ICD-10-CM

## 2023-06-28 DIAGNOSIS — R10.13 EPIGASTRIC PAIN: ICD-10-CM

## 2023-06-28 RX ORDER — DICYCLOMINE HCL 20 MG
20 TABLET ORAL EVERY 6 HOURS PRN
Qty: 30 TABLET | Refills: 0 | Status: SHIPPED | OUTPATIENT
Start: 2023-06-28

## 2023-07-05 ENCOUNTER — TELEPHONE (OUTPATIENT)
Dept: GASTROENTEROLOGY | Facility: CLINIC | Age: 48
End: 2023-07-05

## 2023-07-05 NOTE — TELEPHONE ENCOUNTER
Left message for patient reminding him of his colonoscopy/EGD with Dr. Katerine Daily on 7/17. He will need a , will be called Friday prior with arrival time and he was given his prep and instructions at his office visit. Asked pt to call if he has any questions.

## 2023-07-06 ENCOUNTER — TELEPHONE (OUTPATIENT)
Dept: FAMILY MEDICINE CLINIC | Facility: CLINIC | Age: 48
End: 2023-07-06

## 2023-07-06 ENCOUNTER — OFFICE VISIT (OUTPATIENT)
Dept: FAMILY MEDICINE CLINIC | Facility: CLINIC | Age: 48
End: 2023-07-06
Payer: COMMERCIAL

## 2023-07-06 VITALS
OXYGEN SATURATION: 99 % | SYSTOLIC BLOOD PRESSURE: 138 MMHG | DIASTOLIC BLOOD PRESSURE: 86 MMHG | BODY MASS INDEX: 27.91 KG/M2 | WEIGHT: 177.8 LBS | RESPIRATION RATE: 17 BRPM | TEMPERATURE: 98.3 F | HEIGHT: 67 IN | HEART RATE: 83 BPM

## 2023-07-06 DIAGNOSIS — F33.41 RECURRENT MAJOR DEPRESSIVE DISORDER, IN PARTIAL REMISSION (HCC): ICD-10-CM

## 2023-07-06 DIAGNOSIS — E11.65 TYPE 2 DIABETES MELLITUS WITH HYPERGLYCEMIA, WITHOUT LONG-TERM CURRENT USE OF INSULIN (HCC): ICD-10-CM

## 2023-07-06 DIAGNOSIS — E78.2 MIXED HYPERLIPIDEMIA: ICD-10-CM

## 2023-07-06 DIAGNOSIS — L23.7 POISON IVY DERMATITIS: Primary | ICD-10-CM

## 2023-07-06 PROBLEM — F41.9 ANXIETY: Status: ACTIVE | Noted: 2019-04-03

## 2023-07-06 PROCEDURE — 99214 OFFICE O/P EST MOD 30 MIN: CPT | Performed by: FAMILY MEDICINE

## 2023-07-06 RX ORDER — TRIAMCINOLONE ACETONIDE 5 MG/G
CREAM TOPICAL 2 TIMES DAILY
Qty: 30 G | Refills: 0 | Status: SHIPPED | OUTPATIENT
Start: 2023-07-06 | End: 2023-07-12 | Stop reason: SDUPTHER

## 2023-07-06 NOTE — PROGRESS NOTES
Name: Mason Fields      : 1975      MRN: [id number]  Encounter Provider: Tyrese Wheat MD  Encounter Date: 2023   Encounter department: [facility]     1. Poison ivy dermatitis  Comments:  topical steroids to avoid hyperglycemia as his sugar is not controlled at this time  may use calamine lotion as well  seeing PCP next week  Orders:  -     triamcinolone (KENALOG) 0.5 % cream; Apply topically 2 (two) times a day    2. Mixed hyperlipidemia  Assessment & Plan: On lipitor      3. Type 2 diabetes mellitus with hyperglycemia, without long-term current use of insulin [facility])  Assessment & Plan:    Lab Results   Component Value Date    HGBA1C 11.7 (H) 2023   januvia wasn't dispensed by the pharmacy b/c it needs a prior auth   Will let PCP team know about this   He is only taking jardiance and Glipizide      4. Recurrent major depressive disorder, in partial remission [facility])  Assessment & Plan:  Stable on lexapro             Subjective     Poison Elfredia Fallow  This is a new problem. Episode onset: 3 days ago. The problem has been waxing and waning since onset. Location: bilateral forearms. The rash is characterized by blistering and itchiness. He was exposed to plant contact. Pertinent negatives include no anorexia, congestion, cough, diarrhea, eye pain, facial edema, fatigue, fever, joint pain, nail changes, rhinorrhea, shortness of breath, sore throat or vomiting. The treatment provided mild relief. There is no history of allergies, asthma, eczema or varicella. Review of Systems   Constitutional: Negative for fatigue and fever. HENT: Negative for congestion, rhinorrhea and sore throat. Eyes: Negative for pain. Respiratory: Negative for cough and shortness of breath. Gastrointestinal: Negative for anorexia, diarrhea and vomiting. Musculoskeletal: Negative for gait problem and joint pain. Skin: Positive for rash. Negative for nail changes.        Past Medical History:   Diagnosis Date   • Diabetes mellitus ([address])    • Hyperlipidemia      Past Surgical History:   Procedure Laterality Date   • HERNIA REPAIR      as baby   • MASS EXCISION Left 2020    Procedure: left hand mass excision;  Surgeon: Agapito Ventura MD;  Location: [facility];  Service: Orthopedics     Family History   Problem Relation Age of Onset   • Hypertension Mother    • Diabetes Father      Social History     Socioeconomic History   • Marital status: Single     Spouse name: None   • Number of children: None   • Years of education: None   • Highest education level: None   Occupational History   • None   Tobacco Use   • Smoking status: Former     Packs/day: 0.25     Years: 30.00     Total pack years: 7.50     Types: Cigarettes     Quit date: 2022     Years since quittin.5   • Smokeless tobacco: Never   • Tobacco comments:     1 cigarrette    Vaping Use   • Vaping Use: Some days   Substance and Sexual Activity   • Alcohol use: Yes     Comment: Social   • Drug use: No   • Sexual activity: Yes     Partners: Female   Other Topics Concern   • None   Social History Narrative   • None     Social Determinants of Health     Financial Resource Strain: Not on file   Food Insecurity: Not on file   Transportation Needs: Not on file   Physical Activity: Not on file   Stress: Not on file   Social Connections: Not on file   Intimate Partner Violence: Not on file   Housing Stability: Not on file     Current Outpatient Medications on File Prior to Visit   Medication Sig   • atorvastatin (LIPITOR) 20 mg tablet Take 1 tablet (20 mg total) by mouth daily   • Blood Glucose Monitoring Suppl (OneTouch Verio) w/Device KIT Use to test 1-2x per day   • dicyclomine (BENTYL) 20 mg tablet Take 1 tablet (20 mg total) by mouth every 6 (six) hours as needed (Abdominal cramping)   • escitalopram (LEXAPRO) 10 mg tablet Take 1 tablet (10 mg total) by mouth daily   • famotidine (PEPCID) 20 mg tablet Take 1 tablet (20 mg total) by mouth 2 (two) times a day   • gabapentin (NEURONTIN) 300 mg capsule Take 1 capsule (300 mg total) by mouth daily at bedtime   • glipiZIDE (GLUCOTROL XL) 10 mg 24 hr tablet Take 1 tablet (10 mg total) by mouth daily   • glucose blood (OneTouch Verio) test strip Use to test 1-2x daily   • Jardiance 25 MG TABS TAKE 1 TABLET BY MOUTH EVERY DAY IN THE MORNING   • lisinopril (ZESTRIL) 10 mg tablet Take 1 tablet (10 mg total) by mouth daily   • ondansetron (ZOFRAN) 4 mg tablet Take 1 tablet (4 mg total) by mouth every 8 (eight) hours as needed for nausea or vomiting   • ondansetron (ZOFRAN) 4 mg tablet Take 1 tablet (4 mg total) by mouth every 8 (eight) hours as needed for nausea or vomiting   • OneTouch Delica Lancets 44R MISC Use to test 1-2x daily   • pantoprazole (PROTONIX) 40 mg tablet Take 1 tablet (40 mg total) by mouth daily   • sodium picosulfate, magnesium oxide, citric acid (Clenpiq) oral solution Please administer according to instructions provided by our office   • tadalafil (CIALIS) 5 MG tablet Take 1 tablet (5 mg total) by mouth daily as needed for erectile dysfunction   • metFORMIN (GLUCOPHAGE-XR) 500 mg 24 hr tablet Take 2 tablets (1,000 mg total) by mouth daily with dinner (Patient not taking: Reported on 6/20/2023)   • sitaGLIPtin (Januvia) 100 mg tablet Take 1 tablet (100 mg total) by mouth daily (Patient not taking: Reported on 6/9/2023)     No Known Allergies  Immunization History   Administered Date(s) Administered   • COVID-19 PFIZER VACCINE 0.3 ML IM 08/13/2021, 09/03/2021       Objective     /86 (BP Location: Left arm, Patient Position: Sitting, Cuff Size: Standard)   Pulse 83   Temp 98.3 °F (36.8 °C) (Tympanic)   Resp 17   Ht 5' 7" (1.702 m)   Wt 80.6 kg (177 lb 12.8 oz)   SpO2 99%   BMI 27.85 kg/m²     Physical Exam  Vitals reviewed. Constitutional:       Appearance: Normal appearance. Cardiovascular:      Rate and Rhythm: Normal rate and regular rhythm.       Pulses: Normal pulses. Heart sounds: Normal heart sounds. Skin:     Findings: Erythema and lesion present. Comments: Blistery rash over bilateral arms and forarms   Neurological:      General: No focal deficit present. Mental Status: He is alert and oriented to person, place, and time.        Krzysztof Jordan MD

## 2023-07-06 NOTE — ASSESSMENT & PLAN NOTE
Lab Results   Component Value Date    HGBA1C 11.7 (H) 06/09/2023   januvia wasn't dispensed by the pharmacy b/c it needs a prior auth   Will let PCP team know about this   He is only taking jardiance and Glipizide

## 2023-07-06 NOTE — TELEPHONE ENCOUNTER
Patient was in for Poison Ivy and Januvia 100mg needs prior authorization will start one today if not done.

## 2023-07-12 DIAGNOSIS — L23.7 POISON IVY DERMATITIS: ICD-10-CM

## 2023-07-12 RX ORDER — TRIAMCINOLONE ACETONIDE 5 MG/G
CREAM TOPICAL 2 TIMES DAILY
Qty: 30 G | Refills: 0 | Status: SHIPPED | OUTPATIENT
Start: 2023-07-12

## 2023-07-13 ENCOUNTER — OFFICE VISIT (OUTPATIENT)
Dept: PODIATRY | Facility: CLINIC | Age: 48
End: 2023-07-13
Payer: COMMERCIAL

## 2023-07-13 VITALS
WEIGHT: 177 LBS | HEIGHT: 67 IN | SYSTOLIC BLOOD PRESSURE: 122 MMHG | RESPIRATION RATE: 18 BRPM | DIASTOLIC BLOOD PRESSURE: 87 MMHG | HEART RATE: 101 BPM | BODY MASS INDEX: 27.78 KG/M2

## 2023-07-13 DIAGNOSIS — E11.65 TYPE 2 DIABETES MELLITUS WITH HYPERGLYCEMIA, WITHOUT LONG-TERM CURRENT USE OF INSULIN (HCC): ICD-10-CM

## 2023-07-13 DIAGNOSIS — G62.9 NEUROPATHY: ICD-10-CM

## 2023-07-13 PROCEDURE — 99213 OFFICE O/P EST LOW 20 MIN: CPT | Performed by: PODIATRIST

## 2023-07-13 NOTE — PROGRESS NOTES
Assessment/Plan:    Discussed principles of diabetic foot care. Vascular status is within normal limits and sensorium is intact with the exception of beneath the right first metatarsal head where it is diminished. Patient urged to refrain from walking barefoot. Hopefully, as he begins taking his medications again, gabapentin 300 mg at bedtime will relieve the discomfort of diabetic neuropathy. This dosage may need to be increased. Yearly evaluation is advised. Urged patient to get sugar under better control as it is far too high. No problem-specific Assessment & Plan notes found for this encounter. Diagnoses and all orders for this visit:    Type 2 diabetes mellitus with hyperglycemia, without long-term current use of insulin ([facility])  Comments:  A1c today >13%, but has been out of meds for a year. Restarting meds - pt will add back slowly as discussed. Multiple referrals placed. Orders:  -     Ambulatory Referral to Podiatry    Neuropathy  Comments:  Ongoing in feet. Restarting Gabapentin at bedtime. Referral to Podiatry again ordered. Orders:  -     Ambulatory Referral to Podiatry          Subjective:      Patient ID: Alfredo Tyson is a 56 y.o. male. HPI     Patient, a 81-year-old type II diabetic male presents for yearly diabetic foot exam.  Patient notes that his blood sugar is very high. He states that he was off his medications for 1 year as he lost his job. He recently has stopped taking the medications due to GI upset. He is hopeful to begin his medications in the near future. Patient notes a peculiar sensation when walking only on the bottom of the right foot. He feels as if there is something in his shoe that is irritating the foot. He is not having difficulty at night trying to sleep. Patient had been on gabapentin 300 mg at bedtime but it has been quite sometime since he took the medication. I personally reviewed an A1c dated 6/9/2023. It was 11.7.     I personally reviewed an A1c dated 5/25/2023. It was 13.4. The following portions of the patient's history were reviewed and updated as appropriate: allergies, current medications, past family history, past medical history, past social history, past surgical history and problem list.    Review of Systems   Musculoskeletal: Negative. Neurological: Positive for numbness. Psychiatric/Behavioral: Negative. Objective:      /87   Pulse 101   Resp 18   Ht 5' 7" (1.702 m)   Wt 80.3 kg (177 lb)   BMI 27.72 kg/m²          Physical Exam  Cardiovascular:      Pulses: no weak pulses          Dorsalis pedis pulses are 2+ on the right side and 2+ on the left side. Posterior tibial pulses are 2+ on the right side and 2+ on the left side. Feet:      Right foot:      Skin integrity: No ulcer, skin breakdown, erythema, warmth, callus or dry skin. Left foot:      Skin integrity: No ulcer, skin breakdown, erythema, warmth, callus or dry skin. Diabetic Foot Exam    Patient's shoes and socks removed. Right Foot/Ankle   Right Foot Inspection  Skin Exam: skin normal and skin intact. No dry skin, no warmth, no callus, no erythema, no maceration, no abnormal color, no pre-ulcer, no ulcer and no callus. Toe Exam: ROM and strength within normal limits. Sensory   Vibration: intact  Proprioception: intact  Monofilament testing: diminished    Vascular  Capillary refills: < 3 seconds  The right DP pulse is 2+. The right PT pulse is 2+. Right Toe  - Comprehensive Exam  Ecchymosis: none  Arch: normal  Hammertoes: absent  Claw Toes: absent  Swelling: none   Tenderness: none         Left Foot/Ankle  Left Foot Inspection  Skin Exam: skin normal and skin intact. No dry skin, no warmth, no erythema, no maceration, normal color, no pre-ulcer, no ulcer and no callus. Toe Exam: ROM and strength within normal limits.      Sensory   Vibration: intact  Proprioception: intact  Monofilament testing: intact    Vascular  Capillary refills: < 3 seconds  The left DP pulse is 2+. The left PT pulse is 2+.      Left Toe  - Comprehensive Exam  Ecchymosis: none  Arch: normal  Hammertoes: absent  Claw toes: absent  Swelling: none   Tenderness: none           Assign Risk Category  No deformity present  Loss of protective sensation  No weak pulses  Risk: 1

## 2023-07-16 RX ORDER — SODIUM CHLORIDE, SODIUM LACTATE, POTASSIUM CHLORIDE, CALCIUM CHLORIDE 600; 310; 30; 20 MG/100ML; MG/100ML; MG/100ML; MG/100ML
75 INJECTION, SOLUTION INTRAVENOUS CONTINUOUS
Status: CANCELLED | OUTPATIENT
Start: 2023-07-16

## 2023-07-17 ENCOUNTER — HOSPITAL ENCOUNTER (OUTPATIENT)
Dept: GASTROENTEROLOGY | Facility: AMBULARY SURGERY CENTER | Age: 48
Setting detail: OUTPATIENT SURGERY
Discharge: HOME | End: 2023-07-17
Attending: INTERNAL MEDICINE
Payer: COMMERCIAL

## 2023-07-17 ENCOUNTER — ANESTHESIA EVENT (OUTPATIENT)
Dept: GASTROENTEROLOGY | Facility: AMBULARY SURGERY CENTER | Age: 48
End: 2023-07-17

## 2023-07-17 ENCOUNTER — ANESTHESIA (OUTPATIENT)
Dept: GASTROENTEROLOGY | Facility: AMBULARY SURGERY CENTER | Age: 48
End: 2023-07-17

## 2023-07-17 VITALS
TEMPERATURE: 97.6 F | HEART RATE: 77 BPM | OXYGEN SATURATION: 99 % | SYSTOLIC BLOOD PRESSURE: 127 MMHG | HEIGHT: 69 IN | WEIGHT: 177.03 LBS | DIASTOLIC BLOOD PRESSURE: 88 MMHG | RESPIRATION RATE: 18 BRPM | BODY MASS INDEX: 26.22 KG/M2

## 2023-07-17 DIAGNOSIS — K52.9 GASTROENTERITIS: ICD-10-CM

## 2023-07-17 DIAGNOSIS — R10.13 EPIGASTRIC PAIN: ICD-10-CM

## 2023-07-17 DIAGNOSIS — R10.30 LOWER ABDOMINAL PAIN: ICD-10-CM

## 2023-07-17 DIAGNOSIS — R63.4 ABNORMAL WEIGHT LOSS: ICD-10-CM

## 2023-07-17 DIAGNOSIS — Z12.11 COLON CANCER SCREENING: ICD-10-CM

## 2023-07-17 PROBLEM — F33.41 RECURRENT MAJOR DEPRESSIVE DISORDER, IN PARTIAL REMISSION (HCC): Status: RESOLVED | Noted: 2019-07-17 | Resolved: 2023-07-17

## 2023-07-17 PROBLEM — F41.9 ANXIETY: Status: RESOLVED | Noted: 2019-04-03 | Resolved: 2023-07-17

## 2023-07-17 LAB — GLUCOSE SERPL-MCNC: 261 MG/DL (ref 65–140)

## 2023-07-17 PROCEDURE — 88305 TISSUE EXAM BY PATHOLOGIST: CPT | Performed by: PATHOLOGY

## 2023-07-17 PROCEDURE — 43239 EGD BIOPSY SINGLE/MULTIPLE: CPT | Performed by: INTERNAL MEDICINE

## 2023-07-17 PROCEDURE — 45378 DIAGNOSTIC COLONOSCOPY: CPT | Performed by: INTERNAL MEDICINE

## 2023-07-17 PROCEDURE — 82948 REAGENT STRIP/BLOOD GLUCOSE: CPT

## 2023-07-17 RX ORDER — SODIUM CHLORIDE, SODIUM LACTATE, POTASSIUM CHLORIDE, CALCIUM CHLORIDE 600; 310; 30; 20 MG/100ML; MG/100ML; MG/100ML; MG/100ML
INJECTION, SOLUTION INTRAVENOUS CONTINUOUS PRN
Status: DISCONTINUED | OUTPATIENT
Start: 2023-07-17 | End: 2023-07-17

## 2023-07-17 RX ORDER — LIDOCAINE HYDROCHLORIDE 10 MG/ML
INJECTION, SOLUTION EPIDURAL; INFILTRATION; INTRACAUDAL; PERINEURAL AS NEEDED
Status: DISCONTINUED | OUTPATIENT
Start: 2023-07-17 | End: 2023-07-17

## 2023-07-17 RX ORDER — SODIUM CHLORIDE, SODIUM LACTATE, POTASSIUM CHLORIDE, CALCIUM CHLORIDE 600; 310; 30; 20 MG/100ML; MG/100ML; MG/100ML; MG/100ML
75 INJECTION, SOLUTION INTRAVENOUS CONTINUOUS
Status: DISCONTINUED | OUTPATIENT
Start: 2023-07-17 | End: 2023-07-21 | Stop reason: HOSPADM

## 2023-07-17 RX ORDER — PHENYLEPHRINE HYDROCHLORIDE 10 MG/ML
INJECTION INTRAVENOUS AS NEEDED
Status: DISCONTINUED | OUTPATIENT
Start: 2023-07-17 | End: 2023-07-17

## 2023-07-17 RX ORDER — PROPOFOL 10 MG/ML
INJECTION, EMULSION INTRAVENOUS AS NEEDED
Status: DISCONTINUED | OUTPATIENT
Start: 2023-07-17 | End: 2023-07-17

## 2023-07-17 RX ORDER — PROPOFOL 10 MG/ML
INJECTION, EMULSION INTRAVENOUS CONTINUOUS PRN
Status: DISCONTINUED | OUTPATIENT
Start: 2023-07-17 | End: 2023-07-17

## 2023-07-17 RX ADMIN — SODIUM CHLORIDE, SODIUM LACTATE, POTASSIUM CHLORIDE, AND CALCIUM CHLORIDE: .6; .31; .03; .02 INJECTION, SOLUTION INTRAVENOUS at 07:57

## 2023-07-17 RX ADMIN — PHENYLEPHRINE HYDROCHLORIDE 100 MCG: 10 INJECTION INTRAVENOUS at 08:12

## 2023-07-17 RX ADMIN — PHENYLEPHRINE HYDROCHLORIDE 100 MCG: 10 INJECTION INTRAVENOUS at 08:25

## 2023-07-17 RX ADMIN — SODIUM CHLORIDE, SODIUM LACTATE, POTASSIUM CHLORIDE, AND CALCIUM CHLORIDE 75 ML/HR: .6; .31; .03; .02 INJECTION, SOLUTION INTRAVENOUS at 07:47

## 2023-07-17 RX ADMIN — LIDOCAINE HYDROCHLORIDE 50 MG: 10 INJECTION, SOLUTION EPIDURAL; INFILTRATION; INTRACAUDAL; PERINEURAL at 08:02

## 2023-07-17 RX ADMIN — PROPOFOL 50 MG: 10 INJECTION, EMULSION INTRAVENOUS at 08:15

## 2023-07-17 RX ADMIN — PROPOFOL 50 MG: 10 INJECTION, EMULSION INTRAVENOUS at 08:04

## 2023-07-17 RX ADMIN — PROPOFOL 120 MCG/KG/MIN: 10 INJECTION, EMULSION INTRAVENOUS at 08:14

## 2023-07-17 RX ADMIN — PROPOFOL 150 MG: 10 INJECTION, EMULSION INTRAVENOUS at 08:02

## 2023-07-17 RX ADMIN — PROPOFOL 50 MG: 10 INJECTION, EMULSION INTRAVENOUS at 08:07

## 2023-07-17 RX ADMIN — PROPOFOL 50 MG: 10 INJECTION, EMULSION INTRAVENOUS at 08:12

## 2023-07-17 RX ADMIN — PROPOFOL 50 MG: 10 INJECTION, EMULSION INTRAVENOUS at 08:10

## 2023-07-17 NOTE — ANESTHESIA POSTPROCEDURE EVALUATION
Post-Op Assessment Note    CV Status:  Stable  Pain Score: 0    Pain management: adequate     Mental Status:  Sleepy   Hydration Status:  Euvolemic   PONV Controlled:  Controlled   Airway Patency:  Patent      Post Op Vitals Reviewed: Yes      Staff: CRNA         No notable events documented.     BP   113/72   Temp      Pulse 66   Resp 14   SpO2 100

## 2023-07-17 NOTE — ANESTHESIA PREPROCEDURE EVALUATION
Procedure:  COLONOSCOPY  EGD    Relevant Problems   CARDIO   (+) Mixed hyperlipidemia      ENDO   (+) Type 2 diabetes mellitus with hyperglycemia, without long-term current use of insulin (HCC)      NEURO/PSYCH   (+) Anxiety (Resolved)   (+) Recurrent major depressive disorder, in partial remission (HCC) (Resolved)   (+) Right leg paresthesias        Physical Exam    Airway    Mallampati score: II  TM Distance: >3 FB  Neck ROM: full     Dental       Cardiovascular  Rhythm: regular, Rate: normal,     Pulmonary  Breath sounds clear to auscultation,     Other Findings        Anesthesia Plan  ASA Score- 2     Anesthesia Type- IV sedation with anesthesia with ASA Monitors. Additional Monitors:   Airway Plan:           Plan Factors-    Chart reviewed. Patient is not a current smoker. Induction- intravenous. Postoperative Plan-     Informed Consent- Anesthetic plan and risks discussed with patient. I personally reviewed this patient with the CRNA. Discussed and agreed on the Anesthesia Plan with the CRNA. Theodor Points

## 2023-07-17 NOTE — INTERVAL H&P NOTE
H&P reviewed. After examining the patient I find no changes in the patients condition since the H&P had been written.     Vitals:    07/17/23 0735   BP: 142/90   Pulse: 79   Resp: 18   Temp: 97.6 °F (36.4 °C)   SpO2: 97%

## 2023-07-19 NOTE — TELEPHONE ENCOUNTER
As a final attempt, a third outreach has been made via telephone call to facility. Please see Contacts section for details. This encounter will be closed and completed by end of day. Should we receive the requested information because of previous outreach attempts, the requested patient's chart will be updated appropriately.      Thank you  Tee Williamson

## 2023-07-19 NOTE — TELEPHONE ENCOUNTER
Upon review of the In Basket request we have found as a result of outreach that patient did not have the requested item(s) completed. Any additional questions or concerns should be emailed to the Practice Liaisons via the appropriate education email address, please do not reply via In Basket.     Thank you  Gurjit Monteiro

## 2023-07-21 ENCOUNTER — TELEPHONE (OUTPATIENT)
Dept: FAMILY MEDICINE CLINIC | Facility: CLINIC | Age: 48
End: 2023-07-21

## 2023-07-21 PROCEDURE — 88305 TISSUE EXAM BY PATHOLOGIST: CPT | Performed by: PATHOLOGY

## 2023-07-24 DIAGNOSIS — E11.65 TYPE 2 DIABETES MELLITUS WITH HYPERGLYCEMIA, WITHOUT LONG-TERM CURRENT USE OF INSULIN (HCC): ICD-10-CM

## 2023-07-24 RX ORDER — LINAGLIPTIN 5 MG/1
5 TABLET, FILM COATED ORAL DAILY
Qty: 90 TABLET | Refills: 1 | Status: SHIPPED | OUTPATIENT
Start: 2023-07-24

## 2023-07-25 ENCOUNTER — OFFICE VISIT (OUTPATIENT)
Dept: FAMILY MEDICINE CLINIC | Facility: CLINIC | Age: 48
End: 2023-07-25
Payer: COMMERCIAL

## 2023-07-25 VITALS
WEIGHT: 180.6 LBS | DIASTOLIC BLOOD PRESSURE: 70 MMHG | SYSTOLIC BLOOD PRESSURE: 110 MMHG | HEART RATE: 96 BPM | TEMPERATURE: 96.4 F | OXYGEN SATURATION: 98 % | HEIGHT: 67 IN | RESPIRATION RATE: 14 BRPM | BODY MASS INDEX: 28.35 KG/M2

## 2023-07-25 DIAGNOSIS — Z23 IMMUNIZATION DUE: ICD-10-CM

## 2023-07-25 DIAGNOSIS — I10 PRIMARY HYPERTENSION: ICD-10-CM

## 2023-07-25 DIAGNOSIS — F33.41 RECURRENT MAJOR DEPRESSIVE DISORDER, IN PARTIAL REMISSION (HCC): ICD-10-CM

## 2023-07-25 DIAGNOSIS — R10.13 DYSPEPSIA: ICD-10-CM

## 2023-07-25 DIAGNOSIS — E78.2 MIXED HYPERLIPIDEMIA: ICD-10-CM

## 2023-07-25 DIAGNOSIS — E11.65 TYPE 2 DIABETES MELLITUS WITH HYPERGLYCEMIA, WITHOUT LONG-TERM CURRENT USE OF INSULIN (HCC): Primary | ICD-10-CM

## 2023-07-25 PROCEDURE — 99214 OFFICE O/P EST MOD 30 MIN: CPT | Performed by: FAMILY MEDICINE

## 2023-07-25 PROCEDURE — 90677 PCV20 VACCINE IM: CPT

## 2023-07-25 PROCEDURE — 90471 IMMUNIZATION ADMIN: CPT

## 2023-07-25 NOTE — PROGRESS NOTES
FAMILY PRACTICE OFFICE VISIT       NAME: Waqar Sun  AGE: 56 y.o. SEX: male       : 1975        MRN: [id number]    DATE: 2023  TIME: 12:08 PM    Assessment and Plan   1. Type 2 diabetes mellitus with hyperglycemia, without long-term current use of insulin ([address])  Comments:  Stable on present management - Teja Harrison will be adding Alida Six soon. Continue a lower carb/salt diet, and regular exercise. Repeat FBW prior to next OV. Orders:  -     HEMOGLOBIN A1C W/ EAG ESTIMATION; Future  -     Comprehensive metabolic panel; Future    2. Primary hypertension  Comments:  Controlled on present management. 3. Mixed hyperlipidemia  Comments:  Controlled with Atorvastatin. Orders:  -     Lipid Panel with Direct LDL reflex; Future    4. Recurrent major depressive disorder, in partial remission ([address])  Comments:  Doing well with Escitalopram.    5. Dyspepsia  Comments:  Much improved. Has been working with GI.    6. Immunization due  Comments:  Prev-20 given IM, and tolerated well. Orders:  -     Pneumococcal Conjugate Vaccine 20-valent (Pcv20)         There are no Patient Instructions on file for this visit. Chief Complaint     Chief Complaint   Patient presents with   • Follow-up     Patient being seen for 3 week follow up        History of Present Illness   Waqar Sun is a 56y.o.-year-old male who presents in f/u today. Teja Harrison had EGD and Colonoscopy with GI last week (Dr Deniz Salazar), reassuring - gut is feeling much better. Reviewed labs again from early 2023. We just replaced Januvia (insurance would not cover) with Alida Six. Review of Systems   Review of Systems   Constitutional: Negative for activity change. Respiratory: Negative for shortness of breath. Cardiovascular: Negative for chest pain. Gastrointestinal: Negative for abdominal pain, blood in stool and diarrhea. Endocrine:        No low glucose readings. Psychiatric/Behavioral: Negative for dysphoric mood.  The patient is not nervous/anxious.         Active Problem List     Patient Active Problem List   Diagnosis   • Type 2 diabetes mellitus with hyperglycemia, without long-term current use of insulin (HCC)   • Mixed hyperlipidemia   • Mass of hand, left   • Mass of left hand   • Right leg paresthesias         Past Medical History:  Past Medical History:   Diagnosis Date   • Diabetes mellitus ([address])    • Hyperlipidemia        Past Surgical History:  Past Surgical History:   Procedure Laterality Date   • HERNIA REPAIR      as baby   • MASS EXCISION Left 2020    Procedure: left hand mass excision;  Surgeon: Pauly Dawson MD;  Location: [facility];  Service: Orthopedics       Family History:  Family History   Problem Relation Age of Onset   • Hypertension Mother    • Diabetes Father        Social History:  Social History     Socioeconomic History   • Marital status: Single     Spouse name: Not on file   • Number of children: Not on file   • Years of education: Not on file   • Highest education level: Not on file   Occupational History   • Not on file   Tobacco Use   • Smoking status: Former     Packs/day: 0.25     Years: 30.00     Total pack years: 7.50     Types: Cigarettes     Quit date: 2022     Years since quittin.6   • Smokeless tobacco: Never   • Tobacco comments:     1 cigarrette    Vaping Use   • Vaping Use: Former   Substance and Sexual Activity   • Alcohol use: Yes     Comment: Social   • Drug use: No   • Sexual activity: Yes     Partners: Female   Other Topics Concern   • Not on file   Social History Narrative   • Not on file     Social Determinants of Health     Financial Resource Strain: Not on file   Food Insecurity: Not on file   Transportation Needs: Not on file   Physical Activity: Not on file   Stress: Not on file   Social Connections: Not on file   Intimate Partner Violence: Not on file   Housing Stability: Not on file       Objective     Vitals:    23 1124   BP: 110/70   Pulse: 96 Resp: 14   Temp: (!) 96.4 °F (35.8 °C)   SpO2: 98%     Wt Readings from Last 3 Encounters:   07/25/23 81.9 kg (180 lb 9.6 oz)   07/17/23 80.3 kg (177 lb 0.5 oz)   07/13/23 80.3 kg (177 lb)       Physical Exam  Vitals and nursing note reviewed. Constitutional:       General: He is not in acute distress. Appearance: Normal appearance. He is not ill-appearing, toxic-appearing or diaphoretic. HENT:      Head: Normocephalic and atraumatic. Eyes:      General: No scleral icterus. Conjunctiva/sclera: Conjunctivae normal.   Cardiovascular:      Rate and Rhythm: Normal rate and regular rhythm. Heart sounds: Normal heart sounds. No murmur heard. No friction rub. No gallop. Pulmonary:      Effort: Pulmonary effort is normal. No respiratory distress. Breath sounds: Normal breath sounds. No stridor. No wheezing, rhonchi or rales. Musculoskeletal:      Cervical back: Normal range of motion and neck supple. No rigidity or tenderness. Lymphadenopathy:      Cervical: No cervical adenopathy. Neurological:      Mental Status: He is alert and oriented to person, place, and time. Psychiatric:         Mood and Affect: Mood normal.         Behavior: Behavior normal.         Thought Content:  Thought content normal.         Judgment: Judgment normal.         Pertinent Laboratory/Diagnostic Studies:  Lab Results   Component Value Date    BUN 9 06/09/2023    CREATININE 0.84 06/09/2023    CALCIUM 9.3 06/09/2023    K 4.0 06/09/2023    CO2 29 06/09/2023    CL 96 06/09/2023     Lab Results   Component Value Date    ALT 15 06/09/2023    AST 11 (L) 06/09/2023    ALKPHOS 69 06/09/2023       Lab Results   Component Value Date    WBC 6.26 06/09/2023    HGB 16.3 06/09/2023    HCT 45.6 06/09/2023    MCV 90 06/09/2023     06/09/2023       No results found for: "TSH"    No results found for: "CHOL"  Lab Results   Component Value Date    TRIG 184 (H) 06/09/2023     Lab Results   Component Value Date    HDL 51 06/09/2023     Lab Results   Component Value Date    LDLCALC 71 06/09/2023     Lab Results   Component Value Date    HGBA1C 11.7 (H) 06/09/2023       Results for orders placed or performed during the hospital encounter of 07/17/23   Tissue Exam   Result Value Ref Range    Case Report       Surgical Pathology Report                         Case: [id number]                                   Authorizing Provider:  Pedro Daily MD          Collected:           07/17/2023 0806              Ordering Location:     [facility]   Received:            07/17/2023 [address] [place] [address]                                                                       Pathologist:           Shiela Mars DO                                                            Specimens:   A) - Duodenum, duodenum bx r/o celiac                                                               B) - Stomach, stomach r/o h pylori                                                                  C) - Esophagogastric junction, eg junction r/o esophagitis                                          D) - Esophagus, mid esophagus r/o eoe                                                      Final Diagnosis       A. Duodenum, biopsy:  -Duodenal mucosa with no significant pathologic change.  -Negative for increased intraepithelial lymphocytes. B. Stomach, biopsy:  -Gastric oxyntic mucosa with no significant histopathologic change. C. Esophagogastric junction, biopsy:  -Squamocolumnar junctional and squamous mucosa with no significant histopathologic change. -Minute detached fragment of small bowel epithelium, see comment.  -Negative for dysplasia. Comment: Findings may represent carryover from part A versus intestinal metaplasia. Endoscopic correlation recommended. D. Esophagus, mid, biopsy:  -Esophageal squamous mucosa with no significant histopathologic change.  -Negative for increased intraepithelial eosinophils. Additional Information       All reported additional testing was performed with appropriately reactive controls. These tests were developed and their performance characteristics determined by [facility] or appropriate performing facility, though some tests may be performed on tissues which have not been validated for performance characteristics (such as staining performed on alcohol exposed cell blocks and decalcified tissues). Results should be interpreted with caution and in the context of the patients’ clinical condition. These tests may not be cleared or approved by the U.S. Food and Drug Administration, though the FDA has determined that such clearance or approval is not necessary. These tests are used for clinical purposes and they should not be regarded as investigational or for research. This laboratory has been approved by [place] [id number], designated as a high-complexity laboratory and is qualified to perform these tests. Interpretation performed at [facility] [facility] [address] [place] [address]      Gross Description       A. The specimen is received in formalin, labeled with the patient's name and hospital number, and is designated " duodenum biopsy". The specimen consists of 4 tan-pink soft tissue fragments ranging from 0.2 cm to 0.3 cm in greatest dimension. Entirely submitted. One screened cassette. B. The specimen is received in formalin, labeled with the patient's name and hospital number, and is designated " stomach". The specimen consists of 3 tan-pink soft tissue fragments ranging from 0.1 cm to 0.4 cm in greatest dimension. Due to the size and consistency of the smallest fragment, the smallest fragment may not survive histological processing. Entirely submitted. One screened cassette. C. The specimen is received in formalin, labeled with the patient's name and hospital number, and is designated " GE junction biopsy".   The specimen consists of 2 white tan-pink soft tissue fragments each measuring 0.3 cm in greatest dimension. Entirely submitted. One screened cassette. D. The specimen is received in formalin, labeled with the patient's name and hospital number, and is designated " mid esophagus". The specimen consists of 4 white tan-pink soft tissue fragments ranging from less than 0.1 cm to 0.3 cm in greatest dimension. Due to the size and consistency of the smallest fragment, the smallest fragment may not survive histological processing. Entirely submitted. One screened cassette. Note: The estimated total formalin fixation time based upon information provided by the submitting clinician and the standard processing schedule is under 72 hours.   [facility]     Fingerstick Glucose (POCT)   Result Value Ref Range    POC Glucose 261 (H) 65 - 140 mg/dl       Orders Placed This Encounter   Procedures   • Pneumococcal Conjugate Vaccine 20-valent (Pcv20)   • HEMOGLOBIN A1C W/ EAG ESTIMATION   • Comprehensive metabolic panel   • Lipid Panel with Direct LDL reflex       ALLERGIES:  No Known Allergies    Current Medications     Current Outpatient Medications   Medication Sig Dispense Refill   • atorvastatin (LIPITOR) 20 mg tablet Take 1 tablet (20 mg total) by mouth daily 90 tablet 1   • Blood Glucose Monitoring Suppl (OneTouch Verio) w/Device KIT Use to test 1-2x per day 1 kit 1   • dicyclomine (BENTYL) 20 mg tablet Take 1 tablet (20 mg total) by mouth every 6 (six) hours as needed (Abdominal cramping) 30 tablet 0   • escitalopram (LEXAPRO) 10 mg tablet Take 1 tablet (10 mg total) by mouth daily 90 tablet 1   • famotidine (PEPCID) 20 mg tablet Take 1 tablet (20 mg total) by mouth 2 (two) times a day 60 tablet 2   • gabapentin (NEURONTIN) 300 mg capsule Take 1 capsule (300 mg total) by mouth daily at bedtime 90 capsule 1   • glipiZIDE (GLUCOTROL XL) 10 mg 24 hr tablet Take 1 tablet (10 mg total) by mouth daily 90 tablet 1   • glucose blood (OneTouch Verio) test strip Use to test 1-2x daily 50 strip 4   • Jardiance 25 MG TABS TAKE 1 TABLET BY MOUTH EVERY DAY IN THE MORNING 30 tablet 1   • linaGLIPtin (Tradjenta) 5 MG TABS Take 5 mg by mouth daily 90 tablet 1   • lisinopril (ZESTRIL) 10 mg tablet Take 1 tablet (10 mg total) by mouth daily 90 tablet 1   • ondansetron (ZOFRAN) 4 mg tablet Take 1 tablet (4 mg total) by mouth every 8 (eight) hours as needed for nausea or vomiting 12 tablet 0   • ondansetron (ZOFRAN) 4 mg tablet Take 1 tablet (4 mg total) by mouth every 8 (eight) hours as needed for nausea or vomiting 20 tablet 0   • OneTouch Delica Lancets 92F MISC Use to test 1-2x daily 100 each 3   • pantoprazole (PROTONIX) 40 mg tablet Take 1 tablet (40 mg total) by mouth daily 90 tablet 1   • sodium picosulfate, magnesium oxide, citric acid (Clenpiq) oral solution Please administer according to instructions provided by our office 320 mL 0   • tadalafil (CIALIS) 5 MG tablet Take 1 tablet (5 mg total) by mouth daily as needed for erectile dysfunction 10 tablet 5   • triamcinolone (KENALOG) 0.5 % cream Apply topically 2 (two) times a day 30 g 0   • metFORMIN (GLUCOPHAGE-XR) 500 mg 24 hr tablet Take 2 tablets (1,000 mg total) by mouth daily with dinner (Patient not taking: Reported on 6/20/2023) 180 tablet 1     No current facility-administered medications for this visit.          Health Maintenance     Health Maintenance   Topic Date Due   • HIV Screening  Never done   • DTaP,Tdap,and Td Vaccines (1 - Tdap) Never done   • COVID-19 Vaccine (3 - Pfizer series) 10/29/2021   • DM Eye Exam  10/12/2022   • Influenza Vaccine (1) 09/01/2023   • HEMOGLOBIN A1C  12/09/2023   • BMI: Followup Plan  05/25/2024   • Annual Physical  05/25/2024   • Kidney Health Evaluation: GFR  06/09/2024   • Kidney Health Evaluation: Albumin/Creatinine Ratio  06/09/2024   • Diabetic Foot Exam  07/13/2024   • Depression Screening  07/25/2024   • BMI: Adult  07/25/2024   • Colorectal Cancer Screening  07/14/2033   • Hepatitis C Screening  Completed   • Pneumococcal Vaccine: Pediatrics (0 to 5 Years) and At-Risk Patients (6 to 59 Years)  Completed   • HIB Vaccine  Aged Out   • IPV Vaccine  Aged Out   • Hepatitis A Vaccine  Aged Out   • Meningococcal ACWY Vaccine  Aged Out   • HPV Vaccine  Aged Out     Immunization History   Administered Date(s) Administered   • COVID-19 PFIZER VACCINE 0.3 ML IM 08/13/2021, 09/03/2021   • Pneumococcal Conjugate Vaccine 20-valent (Pcv20), Polysace 07/25/2023          [address]

## 2023-07-29 DIAGNOSIS — E11.65 TYPE 2 DIABETES MELLITUS WITH HYPERGLYCEMIA, WITHOUT LONG-TERM CURRENT USE OF INSULIN (HCC): ICD-10-CM

## 2023-07-29 RX ORDER — EMPAGLIFLOZIN 25 MG/1
TABLET, FILM COATED ORAL
Qty: 30 TABLET | Refills: 1 | Status: SHIPPED | OUTPATIENT
Start: 2023-07-29

## 2023-08-02 ENCOUNTER — OFFICE VISIT (OUTPATIENT)
Dept: ENDOCRINOLOGY | Facility: CLINIC | Age: 48
End: 2023-08-02
Payer: COMMERCIAL

## 2023-08-02 VITALS
HEART RATE: 87 BPM | SYSTOLIC BLOOD PRESSURE: 138 MMHG | BODY MASS INDEX: 26.87 KG/M2 | DIASTOLIC BLOOD PRESSURE: 96 MMHG | WEIGHT: 181.4 LBS | HEIGHT: 69 IN

## 2023-08-02 DIAGNOSIS — E11.65 TYPE 2 DIABETES MELLITUS WITH HYPERGLYCEMIA, WITHOUT LONG-TERM CURRENT USE OF INSULIN (HCC): ICD-10-CM

## 2023-08-02 DIAGNOSIS — E11.9 TYPE 2 DIABETES MELLITUS WITHOUT COMPLICATION, WITHOUT LONG-TERM CURRENT USE OF INSULIN (HCC): Primary | ICD-10-CM

## 2023-08-02 DIAGNOSIS — E78.2 MIXED HYPERLIPIDEMIA: ICD-10-CM

## 2023-08-02 DIAGNOSIS — R10.13 DYSPEPSIA: ICD-10-CM

## 2023-08-02 DIAGNOSIS — I10 HYPERTENSION, UNSPECIFIED TYPE: ICD-10-CM

## 2023-08-02 PROCEDURE — 99214 OFFICE O/P EST MOD 30 MIN: CPT

## 2023-08-02 RX ORDER — FAMOTIDINE 20 MG/1
20 TABLET, FILM COATED ORAL 2 TIMES DAILY
Qty: 180 TABLET | Refills: 1 | Status: SHIPPED | OUTPATIENT
Start: 2023-08-02

## 2023-08-02 NOTE — PROGRESS NOTES
Established Patient Progress Note    CC: Follow up for type 2 diabetes mellitus    Impression & Plan:    Problem List Items Addressed This Visit        Endocrine    Type 2 diabetes mellitus with hyperglycemia, without long-term current use of insulin ([address])     Poorly controlled   - restart metformin. Instructions provided in AVS  - schedule appointment with diabetes education for MNT and meter training  - send BG log in 2 weeks after starting metformin  - work on lifestyle modifications in the interim. Discussed healthy diet and daily exercise  - he will most likely need additional agents to be added back onto his regimen, however given debilitating GI side effects he experienced with resuming his medications all at once, will reintroduce one at a time to delineate which may be potential cause for adverse side effects, if any.   - briefly discussed benefits of GLP1-RA- he has no history of pancreatitis, MTC or MEN syndrome    Lab Results   Component Value Date    HGBA1C 11.7 (H) 06/09/2023               Cardiovascular and Mediastinum    Hypertension     Slightly elevated in office today however last 2 office visits, his BP was normal while off lisinopril  - 110/70 on 7/25/23 and 127/88 on 7/17/23  - ok to stay off lisinopril for now given adverse effects experienced when he resumed his medications all at once. - continue to monitor BP at home.  Goal BP < 130/80            Other    Mixed hyperlipidemia     6/9/23- well controlled while off lipitor  - hold off on lipitor for now        Other Visit Diagnoses     Type 2 diabetes mellitus without complication, without long-term current use of insulin ([address])    -  Primary    Relevant Orders    Ambulatory referral to Diabetic Education          Orders Placed This Encounter   Procedures   • Ambulatory referral to Diabetic Education     Standing Status:   Future     Standing Expiration Date:   8/2/2024     Referral Priority:   Routine     Referral Type:   Consult - AMB Referral Reason:   Specialty Services Required     Requested Specialty:   Diabetes Services     Number of Visits Requested:   1     Expiration Date:   8/2/2024       History of Present Illness:   Rossy Hatch is a 56 y.o. male with a history of type 2 diabetes, hyperlipidemia, hypertension. Complications: neuropathy. Last A1C was 11.7. Home glucose monitoring: unable to perform- does not know how to use BG meter    Patient was off of his medications for 1 year following loss of insurance coverage. Once he got his insurance back, he resumed all of his medications all at once which prompted a visit to the ER for severe abdominal pain, cramping, bloating, nausea and vomiting. He was worked up for pancreatitis which was negative. Reports polydipsia and numbness/tingling in right foot.     Diet: admits to poor diet  Exercise: admits to being sedentary     Current regimen:   Jardiance 25mg daily- not taking  Glipizide XR 10mg with dinner- not taking  Metformin 1g twice daily- not taking  Januvia 100mg daily- not taking    Last Eye Exam: UTD, 8/1/23- report requested  Last Foot Exam: UTD, 7/13/23    ACE/ARB: prescribed lisinopril- not taking  Has hyperlipidemia: prescribed atorvastatin- not taking       Patient Active Problem List   Diagnosis   • Type 2 diabetes mellitus with hyperglycemia, without long-term current use of insulin ([facility]CC)   • Mixed hyperlipidemia   • Mass of hand, left   • Mass of left hand   • Right leg paresthesias   • Hypertension      Past Medical History:   Diagnosis Date   • Diabetes mellitus ([address])    • Hyperlipidemia       Past Surgical History:   Procedure Laterality Date   • HERNIA REPAIR      as baby   • MASS EXCISION Left 1/14/2020    Procedure: left hand mass excision;  Surgeon: Fernanda Tran MD;  Location: [facility];  Service: Orthopedics      Family History   Problem Relation Age of Onset   • Hypertension Mother    • Diabetes Father      Social History     Tobacco Use   • Smoking status: Former     Packs/day: 0.25     Years: 30.00     Total pack years: 7.50     Types: Cigarettes     Quit date: 2022     Years since quittin.6   • Smokeless tobacco: Never   • Tobacco comments:     1 cigarrette    Substance Use Topics   • Alcohol use: Yes     Comment: Social     No Known Allergies      Current Outpatient Medications:   •  Blood Glucose Monitoring Suppl (OneTouch Verio) w/Device KIT, Use to test 1-2x per day, Disp: 1 kit, Rfl: 1  •  pantoprazole (PROTONIX) 40 mg tablet, Take 1 tablet (40 mg total) by mouth daily, Disp: 90 tablet, Rfl: 1  •  atorvastatin (LIPITOR) 20 mg tablet, Take 1 tablet (20 mg total) by mouth daily (Patient not taking: Reported on 2023), Disp: 90 tablet, Rfl: 1  •  dicyclomine (BENTYL) 20 mg tablet, Take 1 tablet (20 mg total) by mouth every 6 (six) hours as needed (Abdominal cramping) (Patient not taking: Reported on 2023), Disp: 30 tablet, Rfl: 0  •  escitalopram (LEXAPRO) 10 mg tablet, Take 1 tablet (10 mg total) by mouth daily (Patient not taking: Reported on 2023), Disp: 90 tablet, Rfl: 1  •  famotidine (PEPCID) 20 mg tablet, Take 1 tablet (20 mg total) by mouth 2 (two) times a day (Patient not taking: Reported on 2023), Disp: 60 tablet, Rfl: 2  •  gabapentin (NEURONTIN) 300 mg capsule, Take 1 capsule (300 mg total) by mouth daily at bedtime (Patient not taking: Reported on 2023), Disp: 90 capsule, Rfl: 1  •  glipiZIDE (GLUCOTROL XL) 10 mg 24 hr tablet, Take 1 tablet (10 mg total) by mouth daily (Patient not taking: Reported on 2023), Disp: 90 tablet, Rfl: 1  •  glucose blood (OneTouch Verio) test strip, Use to test 1-2x daily (Patient not taking: Reported on 2023), Disp: 50 strip, Rfl: 4  •  Jardiance 25 MG TABS, TAKE 1 TABLET BY MOUTH EVERY DAY IN THE MORNING (Patient not taking: Reported on 2023), Disp: 30 tablet, Rfl: 1  •  linaGLIPtin (Tradjenta) 5 MG TABS, Take 5 mg by mouth daily (Patient not taking: Reported on 2023), Disp: 90 tablet, Rfl: 1  •  lisinopril (ZESTRIL) 10 mg tablet, Take 1 tablet (10 mg total) by mouth daily (Patient not taking: Reported on 8/2/2023), Disp: 90 tablet, Rfl: 1  •  metFORMIN (GLUCOPHAGE-XR) 500 mg 24 hr tablet, Take 2 tablets (1,000 mg total) by mouth daily with dinner (Patient not taking: Reported on 6/20/2023), Disp: 180 tablet, Rfl: 1  •  ondansetron (ZOFRAN) 4 mg tablet, Take 1 tablet (4 mg total) by mouth every 8 (eight) hours as needed for nausea or vomiting (Patient not taking: Reported on 8/2/2023), Disp: 12 tablet, Rfl: 0  •  ondansetron (ZOFRAN) 4 mg tablet, Take 1 tablet (4 mg total) by mouth every 8 (eight) hours as needed for nausea or vomiting (Patient not taking: Reported on 8/2/2023), Disp: 20 tablet, Rfl: 0  •  OneTouch Delica Lancets 41L MISC, Use to test 1-2x daily (Patient not taking: Reported on 8/2/2023), Disp: 100 each, Rfl: 3  •  sodium picosulfate, magnesium oxide, citric acid (Clenpiq) oral solution, Please administer according to instructions provided by our office (Patient not taking: Reported on 8/2/2023), Disp: 320 mL, Rfl: 0  •  tadalafil (CIALIS) 5 MG tablet, Take 1 tablet (5 mg total) by mouth daily as needed for erectile dysfunction (Patient not taking: Reported on 8/2/2023), Disp: 10 tablet, Rfl: 5  •  triamcinolone (KENALOG) 0.5 % cream, Apply topically 2 (two) times a day (Patient not taking: Reported on 8/2/2023), Disp: 30 g, Rfl: 0    Review of Systems   Constitutional: Negative for chills and fever. HENT: Negative for ear pain and sore throat. Eyes: Negative for pain and visual disturbance. Respiratory: Negative for cough and shortness of breath. Cardiovascular: Negative for chest pain and palpitations. Gastrointestinal: Negative for abdominal pain and vomiting. Endocrine: Positive for polydipsia. Negative for polyphagia and polyuria. Genitourinary: Negative for dysuria and hematuria. Musculoskeletal: Negative for arthralgias and back pain. Skin: Negative for color change and rash. Neurological: Negative for seizures and syncope. All other systems reviewed and are negative. Physical Exam:  Body mass index is 26.79 kg/m². /96   Pulse 87   Ht 5' 9" (1.753 m)   Wt 82.3 kg (181 lb 6.4 oz)   BMI 26.79 kg/m²    Wt Readings from Last 3 Encounters:   08/02/23 82.3 kg (181 lb 6.4 oz)   07/25/23 81.9 kg (180 lb 9.6 oz)   07/17/23 80.3 kg (177 lb 0.5 oz)       Physical Exam  Vitals reviewed. Constitutional:       Appearance: Normal appearance. HENT:      Head: Normocephalic and atraumatic. Cardiovascular:      Rate and Rhythm: Normal rate. Heart sounds: Normal heart sounds. Pulmonary:      Effort: Pulmonary effort is normal.      Breath sounds: Normal breath sounds. Neurological:      Mental Status: He is alert and oriented to person, place, and time.    Psychiatric:         Mood and Affect: Mood normal.         Behavior: Behavior normal.       Diabetic Foot Exam    Labs:   Lab Results   Component Value Date    HGBA1C 11.7 (H) 06/09/2023    HGBA1C 13.4 (A) 05/25/2023    HGBA1C 11.0 (H) 03/31/2022     Lab Results   Component Value Date    CREATININE 0.84 06/09/2023    CREATININE 0.79 06/04/2023    CREATININE 0.88 03/31/2022    BUN 9 06/09/2023    K 4.0 06/09/2023    CL 96 06/09/2023    CO2 29 06/09/2023     eGFR   Date Value Ref Range Status   06/09/2023 105 ml/min/1.73sq m Final     Lab Results   Component Value Date    HDL 51 06/09/2023    TRIG 184 (H) 06/09/2023     Lab Results   Component Value Date    ALT 15 06/09/2023    AST 11 (L) 06/09/2023    ALKPHOS 69 06/09/2023     Lab Results   Component Value Date    MQB2QGOREMSP 0.929 06/09/2023    FBW7DQMZBGNN 0.672 08/17/2019     No results found for: "Geoff Reston", "TSI"      Patient Instructions   Hold off on Lipitor- your last cholesterol panel was normal  Blood pressure was well controlled the last 2 office visits - hold off on lisinopril    Start Metformin ER 500mg daily with dinner for 1 week. After 1 week if tolerating with no side effects, increase to 1 tab with breakfast and 1 tab with dinner. After 1 week if tolerating with no side effects, increase to 1 tab with breakfast, 2 tabs with dinner  After 1 week if tolerating with no side effects, increase to 2 tabs with breakfast, and 2 tabs with dinner. Schedule an appointment with diabetes education - bring your meter with you to learn how to use. Please check your blood sugar 1-2 times/day- once in the morning before food and the second time before bedtime. Please send blood sugar log in the next 2-4 weeks   Monitor diet, and exercise daily               Discussed with the patient and all questioned fully answered. He will call me if any problems arise.

## 2023-08-02 NOTE — ASSESSMENT & PLAN NOTE
6/9/23- well controlled while off lipitor  - hold off on lipitor for now
Poorly controlled   - restart metformin. Instructions provided in AVS  - schedule appointment with diabetes education for MNT and meter training  - send BG log in 2 weeks after starting metformin  - work on lifestyle modifications in the interim.  Discussed healthy diet and daily exercise  - he will most likely need additional agents to be added back onto his regimen, however given debilitating GI side effects he experienced with resuming his medications all at once, will reintroduce one at a time to delineate which may be potential cause for adverse side effects, if any.   - briefly discussed benefits of GLP1-RA- he has no history of pancreatitis, MTC or MEN syndrome    Lab Results   Component Value Date    HGBA1C 11.7 (H) 06/09/2023
Slightly elevated in office today however last 2 office visits, his BP was normal while off lisinopril  - 110/70 on 7/25/23 and 127/88 on 7/17/23  - ok to stay off lisinopril for now given adverse effects experienced when he resumed his medications all at once. - continue to monitor BP at home.  Goal BP < 130/80
149.9

## 2023-08-02 NOTE — PATIENT INSTRUCTIONS
Hold off on Lipitor- your last cholesterol panel was normal  Blood pressure was well controlled the last 2 office visits - hold off on lisinopril    Start Metformin ER 500mg daily with dinner for 1 week. After 1 week if tolerating with no side effects, increase to 1 tab with breakfast and 1 tab with dinner. After 1 week if tolerating with no side effects, increase to 1 tab with breakfast, 2 tabs with dinner  After 1 week if tolerating with no side effects, increase to 2 tabs with breakfast, and 2 tabs with dinner. Schedule an appointment with diabetes education - bring your meter with you to learn how to use. Please check your blood sugar 1-2 times/day- once in the morning before food and the second time before bedtime.     Please send blood sugar log in the next 2-4 weeks   Monitor diet, and exercise daily

## 2023-08-11 ENCOUNTER — OFFICE VISIT (OUTPATIENT)
Dept: DIABETES SERVICES | Facility: CLINIC | Age: 48
End: 2023-08-11
Payer: COMMERCIAL

## 2023-08-11 DIAGNOSIS — E11.9 TYPE 2 DIABETES MELLITUS WITHOUT COMPLICATION, WITHOUT LONG-TERM CURRENT USE OF INSULIN (HCC): Primary | ICD-10-CM

## 2023-08-11 PROCEDURE — 98960 EDU&TRN PT SELF-MGMT NQHP 1: CPT

## 2023-08-11 NOTE — PROGRESS NOTES
Monitoring Blood Sugars    Instructions for Meter Teaching- Patient instructed in the following:  Site selection and skin preparation, Loading strips and lancet device, meter activation, obtaining blood sample, test strip and lancet disposal and recording log book entries. Patient has good understanding of material covered and was able to test their own blood sugar in office today. Comments: Gave and instructed on One touch Verio Flex meter, Patient demonstrated use, blood sugar in office 289  mg/dl  at  10:15am.    Lot #: [id number]  Exp Date: 09-    Testing frequency: Encouraged pair testing. Test sugars before a meal and 2hr after the same meal, rotating between breakfast, lunch, and dinner. Test sugars twice a day (3 days a week, 7 days a week). Goal Blood Sugars:   Premeal , even better <110  2hr after a meal <180, even better <140  A1C <7%, even better <6.5%.     Start- Stop: 9:59-10:18  Total Minutes: 18 Minutes  Group or Individual Instruction: JAYDEN  Other: Hoang Nur

## 2023-09-17 DIAGNOSIS — E11.65 TYPE 2 DIABETES MELLITUS WITH HYPERGLYCEMIA, WITHOUT LONG-TERM CURRENT USE OF INSULIN (HCC): ICD-10-CM

## 2023-09-18 RX ORDER — EMPAGLIFLOZIN 25 MG/1
TABLET, FILM COATED ORAL
Qty: 90 TABLET | Refills: 1 | Status: SHIPPED | OUTPATIENT
Start: 2023-09-18

## 2023-09-25 ENCOUNTER — HOSPITAL ENCOUNTER (EMERGENCY)
Facility: HOSPITAL | Age: 48
Discharge: HOME | End: 2023-09-25
Attending: EMERGENCY MEDICINE
Payer: COMMERCIAL

## 2023-09-25 VITALS
DIASTOLIC BLOOD PRESSURE: 89 MMHG | SYSTOLIC BLOOD PRESSURE: 157 MMHG | TEMPERATURE: 97.6 F | HEART RATE: 112 BPM | OXYGEN SATURATION: 99 % | RESPIRATION RATE: 18 BRPM

## 2023-09-25 DIAGNOSIS — M25.511 ACUTE PAIN OF RIGHT SHOULDER: Primary | ICD-10-CM

## 2023-09-25 PROCEDURE — 96372 THER/PROPH/DIAG INJ SC/IM: CPT

## 2023-09-25 PROCEDURE — 99282 EMERGENCY DEPT VISIT SF MDM: CPT

## 2023-09-25 PROCEDURE — 99284 EMERGENCY DEPT VISIT MOD MDM: CPT | Performed by: EMERGENCY MEDICINE

## 2023-09-25 RX ORDER — METHOCARBAMOL 500 MG/1
500 TABLET, FILM COATED ORAL ONCE
Status: COMPLETED | OUTPATIENT
Start: 2023-09-25 | End: 2023-09-25

## 2023-09-25 RX ORDER — LIDOCAINE 50 MG/G
1 PATCH TOPICAL ONCE
Status: DISCONTINUED | OUTPATIENT
Start: 2023-09-25 | End: 2023-09-25 | Stop reason: HOSPADM

## 2023-09-25 RX ORDER — IBUPROFEN 600 MG/1
600 TABLET ORAL EVERY 6 HOURS PRN
Qty: 30 TABLET | Refills: 0 | Status: SHIPPED | OUTPATIENT
Start: 2023-09-25

## 2023-09-25 RX ORDER — METHOCARBAMOL 500 MG/1
500 TABLET, FILM COATED ORAL 2 TIMES DAILY
Qty: 20 TABLET | Refills: 0 | Status: SHIPPED | OUTPATIENT
Start: 2023-09-25

## 2023-09-25 RX ORDER — LIDOCAINE 50 MG/G
1 PATCH TOPICAL DAILY
Qty: 15 PATCH | Refills: 0 | Status: SHIPPED | OUTPATIENT
Start: 2023-09-25

## 2023-09-25 RX ORDER — KETOROLAC TROMETHAMINE 30 MG/ML
15 INJECTION, SOLUTION INTRAMUSCULAR; INTRAVENOUS ONCE
Status: COMPLETED | OUTPATIENT
Start: 2023-09-25 | End: 2023-09-25

## 2023-09-25 RX ADMIN — METHOCARBAMOL 500 MG: 500 TABLET ORAL at 09:45

## 2023-09-25 RX ADMIN — LIDOCAINE 1 PATCH: 50 PATCH CUTANEOUS at 09:45

## 2023-09-25 RX ADMIN — KETOROLAC TROMETHAMINE 15 MG: 30 INJECTION, SOLUTION INTRAMUSCULAR; INTRAVENOUS at 09:45

## 2023-09-25 NOTE — DISCHARGE INSTRUCTIONS
Start using pain medications as prescribed. Schedule an appointment with sports medicine. If you develop new or worsening symptoms, please return to the Emergency Department for further evaluation.

## 2023-09-25 NOTE — ED PROVIDER NOTES
History  Chief Complaint   Patient presents with   • Shoulder Pain     Pt c/o right shoulder pain after flag football game. Pt describes jumping up, blocking a pass, and having pain/soreness after. Limited range of motion, unable to lift arm higher than shoulder. HPI     Patient is a 80-year-old male with past medical history diabetes presenting with acute right shoulder pain. Patient states during a flag football game on Friday he fully extended both of his arms in the air to try to block a football pass. He felt okay at the time however the next day he noticed significant right shoulder pain and decreased range of motion. He denies any direct trauma to the shoulder or fall. He has no numbness or tingling in the arm. He has been trying ibuprofen and ice without relief. Otherwise feels in usual state of health. Prior to Admission Medications   Prescriptions Last Dose Informant Patient Reported? Taking?    Blood Glucose Monitoring Suppl (OneTouch Verio) w/Device KIT  Self No No   Sig: Use to test 1-2x per day   Jardiance 25 MG TABS   No No   Sig: TAKE 1 TABLET BY MOUTH EVERY DAY IN THE MORNING   OneTouch Delica Lancets 57A MISC  Self No No   Sig: Use to test 1-2x daily   Patient not taking: Reported on 8/2/2023   atorvastatin (LIPITOR) 20 mg tablet  Self No No   Sig: Take 1 tablet (20 mg total) by mouth daily   Patient not taking: Reported on 8/2/2023   dicyclomine (BENTYL) 20 mg tablet  Self No No   Sig: Take 1 tablet (20 mg total) by mouth every 6 (six) hours as needed (Abdominal cramping)   Patient not taking: Reported on 8/2/2023   escitalopram (LEXAPRO) 10 mg tablet  Self No No   Sig: Take 1 tablet (10 mg total) by mouth daily   Patient not taking: Reported on 8/2/2023   famotidine (PEPCID) 20 mg tablet   No No   Sig: Take 1 tablet (20 mg total) by mouth 2 (two) times a day   gabapentin (NEURONTIN) 300 mg capsule  Self No No   Sig: Take 1 capsule (300 mg total) by mouth daily at bedtime   Patient not taking: Reported on 8/2/2023   glipiZIDE (GLUCOTROL XL) 10 mg 24 hr tablet  Self No No   Sig: Take 1 tablet (10 mg total) by mouth daily   Patient not taking: Reported on 8/2/2023   glucose blood (OneTouch Verio) test strip  Self No No   Sig: Use to test 1-2x daily   Patient not taking: Reported on 8/2/2023   linaGLIPtin (Tradjenta) 5 MG TABS  Self No No   Sig: Take 5 mg by mouth daily   Patient not taking: Reported on 8/2/2023   lisinopril (ZESTRIL) 10 mg tablet  Self No No   Sig: Take 1 tablet (10 mg total) by mouth daily   Patient not taking: Reported on 8/2/2023   metFORMIN (GLUCOPHAGE-XR) 500 mg 24 hr tablet  Self No No   Sig: Take 2 tablets (1,000 mg total) by mouth daily with dinner   Patient not taking: Reported on 6/20/2023   ondansetron (ZOFRAN) 4 mg tablet  Self No No   Sig: Take 1 tablet (4 mg total) by mouth every 8 (eight) hours as needed for nausea or vomiting   Patient not taking: Reported on 8/2/2023   ondansetron (ZOFRAN) 4 mg tablet  Self No No   Sig: Take 1 tablet (4 mg total) by mouth every 8 (eight) hours as needed for nausea or vomiting   Patient not taking: Reported on 8/2/2023   pantoprazole (PROTONIX) 40 mg tablet  Self No No   Sig: Take 1 tablet (40 mg total) by mouth daily   sodium picosulfate, magnesium oxide, citric acid (Clenpiq) oral solution  Self No No   Sig: Please administer according to instructions provided by our office   Patient not taking: Reported on 8/2/2023   tadalafil (CIALIS) 5 MG tablet  Self No No   Sig: Take 1 tablet (5 mg total) by mouth daily as needed for erectile dysfunction   Patient not taking: Reported on 8/2/2023   triamcinolone (KENALOG) 0.5 % cream  Self No No   Sig: Apply topically 2 (two) times a day   Patient not taking: Reported on 8/2/2023      Facility-Administered Medications: None       Past Medical History:   Diagnosis Date   • Diabetes mellitus ([address])    • Hyperlipidemia        Past Surgical History:   Procedure Laterality Date   • HERNIA REPAIR as baby   • MASS EXCISION Left 2020    Procedure: left hand mass excision;  Surgeon: Genaro Chavez MD;  Location: [facility];  Service: Orthopedics       Family History   Problem Relation Age of Onset   • Hypertension Mother    • Diabetes Father      I have reviewed and agree with the history as documented. E-Cigarette/Vaping   • E-Cigarette Use Former User      E-Cigarette/Vaping Substances   • Nicotine No    • THC No    • CBD No    • Flavoring No    • Other No    • Unknown No      Social History     Tobacco Use   • Smoking status: Former     Packs/day: 0.25     Years: 30.00     Total pack years: 7.50     Types: Cigarettes     Quit date: 2022     Years since quittin.8   • Smokeless tobacco: Never   • Tobacco comments:     1 cigarrette    Vaping Use   • Vaping Use: Former   Substance Use Topics   • Alcohol use: Yes     Comment: Social   • Drug use: No        Review of Systems   Constitutional: Negative for chills and fever. HENT: Negative for ear pain and sore throat. Eyes: Negative for pain and visual disturbance. Respiratory: Negative for cough and shortness of breath. Cardiovascular: Negative for chest pain and palpitations. Gastrointestinal: Negative for abdominal pain and vomiting. Genitourinary: Negative for dysuria and hematuria. Musculoskeletal: Negative for arthralgias and back pain. Skin: Negative for color change and rash. Neurological: Negative for seizures and syncope. All other systems reviewed and are negative.       Physical Exam  ED Triage Vitals   Temperature Pulse Respirations Blood Pressure SpO2   23 0848 23 0848 23 0848 23 0848 23 0849   97.6 °F (36.4 °C) (!) 112 18 157/89 99 %      Temp Source Heart Rate Source Patient Position - Orthostatic VS BP Location FiO2 (%)   23 0848 23 0848 23 0848 23 0848 --   Temporal Monitor Sitting Left arm       Pain Score       23 0848       8 Orthostatic Vital Signs  Vitals:    09/25/23 0848   BP: 157/89   Pulse: (!) 112   Patient Position - Orthostatic VS: Sitting       Physical Exam  Vitals and nursing note reviewed. Constitutional:       General: He is not in acute distress. Appearance: He is well-developed. He is not toxic-appearing. HENT:      Head: Normocephalic and atraumatic. Right Ear: External ear normal.      Left Ear: External ear normal.      Nose: Nose normal.      Mouth/Throat:      Pharynx: Oropharynx is clear. No oropharyngeal exudate or posterior oropharyngeal erythema. Eyes:      Extraocular Movements: Extraocular movements intact. Conjunctiva/sclera: Conjunctivae normal.      Pupils: Pupils are equal, round, and reactive to light. Cardiovascular:      Rate and Rhythm: Normal rate and regular rhythm. Pulses: Normal pulses. Heart sounds: Normal heart sounds. No murmur heard. No friction rub. No gallop. Pulmonary:      Effort: Pulmonary effort is normal. No respiratory distress. Breath sounds: Normal breath sounds. No wheezing, rhonchi or rales. Abdominal:      General: Abdomen is flat. Palpations: Abdomen is soft. Tenderness: There is no abdominal tenderness. There is no guarding or rebound. Musculoskeletal:         General: Tenderness present. Cervical back: Normal range of motion. No rigidity. Right lower leg: No edema. Left lower leg: No edema. Comments: Patient with normal passive range of motion of the right shoulder, however is significant decreased active range, limits with extension and internal rotation. Patient has normal empty can test.  Patient is point tender along the biceps groove at the shoulder joint. Patient has normal biceps and triceps motor function. Neurovascular intact. Skin:     General: Skin is warm and dry. Capillary Refill: Capillary refill takes less than 2 seconds.    Neurological:      General: No focal deficit present. Mental Status: He is alert. Psychiatric:         Mood and Affect: Mood normal.         ED Medications  Medications   ketorolac (TORADOL) injection 15 mg (15 mg Intramuscular Given 9/25/23 0945)   methocarbamol (ROBAXIN) tablet 500 mg (500 mg Oral Given 9/25/23 0945)       Diagnostic Studies  Results Reviewed     None                 No orders to display         Procedures  Procedures      ED Course                             SBIRT 20yo+    Flowsheet Row Most Recent Value   Initial Alcohol Screen: US AUDIT-C     1. How often do you have a drink containing alcohol? 0 Filed at: 09/25/2023 0849   2. How many drinks containing alcohol do you have on a typical day you are drinking? 0 Filed at: 09/25/2023 0849   3a. Male UNDER 65: How often do you have five or more drinks on one occasion? 0 Filed at: 09/25/2023 0849   3b. FEMALE Any Age, or MALE 65+: How often do you have 4 or more drinks on one occassion? 0 Filed at: 09/25/2023 0849   Audit-C Score 0 Filed at: 09/25/2023 8239   RUFINO: How many times in the past year have you. .. Used an illegal drug or used a prescription medication for non-medical reasons? Never Filed at: 09/25/2023 5112                Medical Decision Making  69-year-old presenting with acute right shoulder pain. Vital signs are stable. Patient has likely musculoskeletal right shoulder injury. Given that there is no acute trauma to the area, do not feel x-rays will be beneficial.  Recommended patient start with muscle relaxer, Lidoderm, ibuprofen. Referred for sports medicine evaluation. All questions were answered and patient is comfortable with discharge plan. Risk  Prescription drug management.             Disposition  Final diagnoses:   Acute pain of right shoulder     Time reflects when diagnosis was documented in both MDM as applicable and the Disposition within this note     Time User Action Codes Description Comment    9/25/2023  9:32 AM Polo Little Add [V80.184] Acute pain of right shoulder       ED Disposition     ED Disposition   Discharge    Condition   Stable    Date/Time   Mon Sep 25, 2023  9:32 AM    Comment   Julieth Yang discharge to home/self care.                Follow-up Information     Follow up With Specialties Details Why Suhas Barrera MD Sports Medicine, Orthopedic Surgery Schedule an appointment as soon as possible for a visit in 1 week  [address]  [phone]            Discharge Medication List as of 9/25/2023  9:33 AM      START taking these medications    Details   ibuprofen (MOTRIN) 600 mg tablet Take 1 tablet (600 mg total) by mouth every 6 (six) hours as needed for mild pain, Starting Mon 9/25/2023, Normal      lidocaine (Lidoderm) 5 % Apply 1 patch topically over 12 hours daily Remove & Discard patch within 12 hours or as directed by MD, Starting Mon 9/25/2023, Normal      methocarbamol (ROBAXIN) 500 mg tablet Take 1 tablet (500 mg total) by mouth 2 (two) times a day, Starting Mon 9/25/2023, Normal         CONTINUE these medications which have NOT CHANGED    Details   atorvastatin (LIPITOR) 20 mg tablet Take 1 tablet (20 mg total) by mouth daily, Starting Thu 5/25/2023, Normal      Blood Glucose Monitoring Suppl (OneTouch Verio) w/Device KIT Use to test 1-2x per day, Normal      dicyclomine (BENTYL) 20 mg tablet Take 1 tablet (20 mg total) by mouth every 6 (six) hours as needed (Abdominal cramping), Starting Wed 6/28/2023, Normal      escitalopram (LEXAPRO) 10 mg tablet Take 1 tablet (10 mg total) by mouth daily, Starting Thu 5/25/2023, Normal      famotidine (PEPCID) 20 mg tablet Take 1 tablet (20 mg total) by mouth 2 (two) times a day, Starting Wed 8/2/2023, Normal      gabapentin (NEURONTIN) 300 mg capsule Take 1 capsule (300 mg total) by mouth daily at bedtime, Starting Thu 5/25/2023, Normal      glipiZIDE (GLUCOTROL XL) 10 mg 24 hr tablet Take 1 tablet (10 mg total) by mouth daily, Starting Thu 5/25/2023, Normal      glucose blood (OneTouch Verio) test strip Use to test 1-2x daily, Normal      Jardiance 25 MG TABS TAKE 1 TABLET BY MOUTH EVERY DAY IN THE MORNING, Normal      linaGLIPtin (Tradjenta) 5 MG TABS Take 5 mg by mouth daily, Starting Mon 7/24/2023, Normal      lisinopril (ZESTRIL) 10 mg tablet Take 1 tablet (10 mg total) by mouth daily, Starting Thu 5/25/2023, Normal      metFORMIN (GLUCOPHAGE-XR) 500 mg 24 hr tablet Take 2 tablets (1,000 mg total) by mouth daily with dinner, Starting Mon 6/5/2023, Normal      !! ondansetron (ZOFRAN) 4 mg tablet Take 1 tablet (4 mg total) by mouth every 8 (eight) hours as needed for nausea or vomiting, Starting Sun 6/4/2023, Normal      !! ondansetron (ZOFRAN) 4 mg tablet Take 1 tablet (4 mg total) by mouth every 8 (eight) hours as needed for nausea or vomiting, Starting Fri 6/9/2023, Normal      OneTouch Delica Lancets 41L MISC Use to test 1-2x daily, Normal      pantoprazole (PROTONIX) 40 mg tablet Take 1 tablet (40 mg total) by mouth daily, Starting Fri 6/16/2023, Normal      sodium picosulfate, magnesium oxide, citric acid (Clenpiq) oral solution Please administer according to instructions provided by our office, Normal      tadalafil (CIALIS) 5 MG tablet Take 1 tablet (5 mg total) by mouth daily as needed for erectile dysfunction, Starting Thu 5/25/2023, Normal      triamcinolone (KENALOG) 0.5 % cream Apply topically 2 (two) times a day, Starting Wed 7/12/2023, Normal       !! - Potential duplicate medications found. Please discuss with provider. PDMP Review       Value Time User    PDMP Reviewed  Yes 1/14/2020 12:17 PM Canary Goodpasture, MD           ED Provider  Attending physically available and evaluated Sakshi Berkowitz. I managed the patient along with the ED Attending.     Electronically Signed by         Devika Bernal MD  09/25/23 4760

## 2023-09-25 NOTE — Clinical Note
Ulises Rees was seen and treated in our emergency department on 9/25/2023. Diagnosis: acute right shoulder pain    Marleny Arnold  may return to work on return date, is off the rest of the shift today. He may return on this date: 09/26/2023    Lifting restrictions to any weight until can be seen by sports medicine/orthopedics. If you have any questions or concerns, please don't hesitate to call.       Марина Fuentes MD    ______________________________           _______________          _______________  Hospital Representative                              Date                                Time

## 2023-09-25 NOTE — ED ATTENDING ATTESTATION
9/25/2023  I, Jim Gomez DO, saw and evaluated the patient. I have discussed the patient with the resident/non-physician practitioner and agree with the resident's/non-physician practitioner's findings, Plan of Care, and MDM as documented in the resident's/non-physician practitioner's note, except where noted. All available labs and Radiology studies were reviewed. I was present for key portions of any procedure(s) performed by the resident/non-physician practitioner and I was immediately available to provide assistance. At this point I agree with the current assessment done in the Emergency Department. I have conducted an independent evaluation of this patient a history and physical is as follows:    52-year-old male presents with right shoulder pain. Patient was playing football on Friday and started having pain on Saturday. Patient denies getting hit in the shoulder, did not fall in the shoulder. Has pain with movement no other complaints. On exam-no acute distress, no respiratory distress, tenderness over bicipital groove of right shoulder with decreased range of motion secondary to pain but distally neurovascular intact. No pain over the clavicle or acromion.   Plan- suspect musculoskeletal pain, will treat with pain medication and refer to sports medicine    ED Course         Critical Care Time  Procedures

## 2023-09-29 ENCOUNTER — OFFICE VISIT (OUTPATIENT)
Dept: OBGYN CLINIC | Facility: OTHER | Age: 48
End: 2023-09-29

## 2023-09-29 ENCOUNTER — APPOINTMENT (OUTPATIENT)
Dept: RADIOLOGY | Facility: OTHER | Age: 48
End: 2023-09-29
Payer: COMMERCIAL

## 2023-09-29 VITALS
SYSTOLIC BLOOD PRESSURE: 124 MMHG | WEIGHT: 181 LBS | DIASTOLIC BLOOD PRESSURE: 85 MMHG | BODY MASS INDEX: 26.81 KG/M2 | HEIGHT: 69 IN | HEART RATE: 94 BPM

## 2023-09-29 DIAGNOSIS — S49.91XA RIGHT SHOULDER INJURY, INITIAL ENCOUNTER: ICD-10-CM

## 2023-09-29 DIAGNOSIS — S49.91XA RIGHT SHOULDER INJURY, INITIAL ENCOUNTER: Primary | ICD-10-CM

## 2023-09-29 DIAGNOSIS — S46.211A STRAIN OF RIGHT BICEPS TENDON: ICD-10-CM

## 2023-09-29 PROCEDURE — 73030 X-RAY EXAM OF SHOULDER: CPT

## 2023-09-29 RX ORDER — MELOXICAM 15 MG/1
15 TABLET ORAL DAILY PRN
Qty: 14 TABLET | Refills: 0 | Status: SHIPPED | OUTPATIENT
Start: 2023-09-29

## 2023-09-29 NOTE — PROGRESS NOTES
[address]  [address] [address] [facility] [address] [id number]      ASSESSMENT & PLAN:      1. Right shoulder injury, initial encounter    2. Strain of right biceps tendon    3. Suspected possible right shoulder labral injury    Orders Placed This Encounter   Procedures   • XR shoulder 2+ vw right   • Ambulatory Referral to Physical Therapy     New Medications Ordered This Visit   Medications   • meloxicam (Mobic) 15 mg tablet     Sig: Take 1 tablet (15 mg total) by mouth daily as needed for mild pain     Dispense:  14 tablet     Refill:  0       We have reviewed clinical exam and findings and relevant study findings with patient as well as discussed management options, reviewed natural history, and engaged in shared decision-making. Plan and instruction below was discussed with patient:  Patient Instructions   Physical therapy  Start Meloxicam 15mg daily as needed  May take OTC Tylenol      Follow-Up: Return in about 1 month (around 10/29/2023). IMAGING STUDIES: (I personally reviewed images in PACS and report)    XR right shoulder 9/29/23  No acute fracture or dislocation noted    PAST REPORTS:    PAST PROCEDURES:    Subjective      HPI    Chief Complaint   Patient presents with   • Right Shoulder - Pain       53 y.o. male presents for initial evaluation of traumatic right shoulder pain.     Hand dominance: right  Injury related: Yes, 9/22/23 when playing flag football, blocked football with extended shoulder, did not feel popping sensation, next day felt sore with limited ROM due to pain  Starting/onset: traumatic  Location: anterior  Pain scale: 0/10 at rest, 8/10 with shoulder movement, pain from condition does interfere with activities of daily living  Description: aching and dull  Radiation: none  Night pain: no  Rest pain: no  Aggravation: any movement  Associated sx: no distal numbness, tingling, or weakness  Treatment: OTC NSAIDS, muscle relaxant, IM toradol    Patient was recently seen by ED on 9/25/2023 for this problem with concern for musculoskeletal right shoulder injury. At that visit, patient was recommended muscle relaxer, Lidoderm, ibuprofen and instructed to follow-up with sports med. Review of Systems:  Review of Systems         Objective     /85 (BP Location: Left arm, Patient Position: Sitting, Cuff Size: Large)   Pulse 94   Ht 5' 9" (1.753 m)   Wt 82.1 kg (181 lb)   BMI 26.73 kg/m²   Body mass index is 26.73 kg/m².    Patient Active Problem List   Diagnosis   • Type 2 diabetes mellitus with hyperglycemia, without long-term current use of insulin ([address])   • Mixed hyperlipidemia   • Mass of hand, left   • Mass of left hand   • Right leg paresthesias   • Hypertension        Historical Information     Meds/Allergies   Current Outpatient Medications on File Prior to Visit   Medication Sig   • atorvastatin (LIPITOR) 20 mg tablet Take 1 tablet (20 mg total) by mouth daily (Patient not taking: Reported on 8/2/2023)   • Blood Glucose Monitoring Suppl (OneTouch Verio) w/Device KIT Use to test 1-2x per day   • dicyclomine (BENTYL) 20 mg tablet Take 1 tablet (20 mg total) by mouth every 6 (six) hours as needed (Abdominal cramping) (Patient not taking: Reported on 8/2/2023)   • escitalopram (LEXAPRO) 10 mg tablet Take 1 tablet (10 mg total) by mouth daily (Patient not taking: Reported on 8/2/2023)   • famotidine (PEPCID) 20 mg tablet Take 1 tablet (20 mg total) by mouth 2 (two) times a day   • gabapentin (NEURONTIN) 300 mg capsule Take 1 capsule (300 mg total) by mouth daily at bedtime (Patient not taking: Reported on 8/2/2023)   • glipiZIDE (GLUCOTROL XL) 10 mg 24 hr tablet Take 1 tablet (10 mg total) by mouth daily (Patient not taking: Reported on 8/2/2023)   • glucose blood (OneTouch Verio) test strip Use to test 1-2x daily (Patient not taking: Reported on 8/2/2023)   • ibuprofen (MOTRIN) 600 mg tablet Take 1 tablet (600 mg total) by mouth every 6 (six) hours as needed for mild pain   • Jardiance 25 MG TABS TAKE 1 TABLET BY MOUTH EVERY DAY IN THE MORNING   • lidocaine (Lidoderm) 5 % Apply 1 patch topically over 12 hours daily Remove & Discard patch within 12 hours or as directed by MD   • linaGLIPtin (Tradjenta) 5 MG TABS Take 5 mg by mouth daily (Patient not taking: Reported on 8/2/2023)   • lisinopril (ZESTRIL) 10 mg tablet Take 1 tablet (10 mg total) by mouth daily (Patient not taking: Reported on 8/2/2023)   • metFORMIN (GLUCOPHAGE-XR) 500 mg 24 hr tablet Take 2 tablets (1,000 mg total) by mouth daily with dinner (Patient not taking: Reported on 6/20/2023)   • methocarbamol (ROBAXIN) 500 mg tablet Take 1 tablet (500 mg total) by mouth 2 (two) times a day   • ondansetron (ZOFRAN) 4 mg tablet Take 1 tablet (4 mg total) by mouth every 8 (eight) hours as needed for nausea or vomiting (Patient not taking: Reported on 8/2/2023)   • ondansetron (ZOFRAN) 4 mg tablet Take 1 tablet (4 mg total) by mouth every 8 (eight) hours as needed for nausea or vomiting (Patient not taking: Reported on 8/2/2023)   • OneTouch Delica Lancets 43R MISC Use to test 1-2x daily (Patient not taking: Reported on 8/2/2023)   • pantoprazole (PROTONIX) 40 mg tablet Take 1 tablet (40 mg total) by mouth daily   • sodium picosulfate, magnesium oxide, citric acid (Clenpiq) oral solution Please administer according to instructions provided by our office (Patient not taking: Reported on 8/2/2023)   • tadalafil (CIALIS) 5 MG tablet Take 1 tablet (5 mg total) by mouth daily as needed for erectile dysfunction (Patient not taking: Reported on 8/2/2023)   • triamcinolone (KENALOG) 0.5 % cream Apply topically 2 (two) times a day (Patient not taking: Reported on 8/2/2023)     No Known Allergies     Historical Information   Past Medical History:   Diagnosis Date   • Diabetes mellitus ([address])    • Hyperlipidemia      Past Surgical History:   Procedure Laterality Date   • HERNIA REPAIR      as baby   • MASS EXCISION Left 2020    Procedure: left hand mass excision;  Surgeon: Alexander Gowers, MD;  Location: [facility];  Service: Orthopedics     Family History   Problem Relation Age of Onset   • Hypertension Mother    • Diabetes Father        Social History   Social Determinants of Health     Tobacco Use: Medium Risk (2023)    Patient History    • Smoking Tobacco Use: Former    • Smokeless Tobacco Use: Never    • Passive Exposure: Not on file   Alcohol Use: Not on file   Financial Resource Strain: Not on file   Food Insecurity: Not on file   Transportation Needs: Not on file   Physical Activity: Not on file   Stress: Not on file   Social Connections: Not on file   Intimate Partner Violence: Not on file   Depression: Not at risk (2023)    PHQ-2    • PHQ-2 Score: 0   Housing Stability: Not on file   Utilities: Not on file      Social History     Substance and Sexual Activity   Alcohol Use Yes    Comment: Social     Social History     Substance and Sexual Activity   Drug Use No     Social History     Tobacco Use   Smoking Status Former   • Packs/day: 0.25   • Years: 30.00   • Total pack years: 7.50   • Types: Cigarettes   • Quit date: 2022   • Years since quittin.8   Smokeless Tobacco Never   Tobacco Comments    1 cigarrette        Objective     Physical Exam   Physical Exam  Ortho Exam    Constitutional:  Vitals and nursing note reviewed. General: Normal appearance. Not ill-appearing, toxic-appearing or diaphoretic. not in acute distress. HENT: Head is normocephalic and atraumatic. Bilateral external ear and nose normal  Eyes: No discharge. Extraocular movements intact. Cardiovascular: Normal rate  Pulmonary:  Pulmonary effort is normal. No respiratory distress. Abdominal: There is no distension. Musculoskeletal: No gross injury or deformity except for other than mention below. Skin: Skin is warm. Neurological: Awake, alert, and mental status is at baseline.  No gross focal deficit present.   Psychiatric:  Mood normal. Behavior normal.     Cervical Spine Examination:    Normal lordosis without gross exaggeration  No palpable tenderness, step deformity or paraspinous spasm noted   Full ROM without pain or limitations in flexion, extension, lateral bending and rotation   C3 - T1 dermatomal sensation Intact    Right Shoulder Examination:    No asymmetry or overlying erythema, ecchymosis, or rash noted   Effusion: none present  Palpable bony, joint or soft tissue tenderness: none  Limited active and passive ROM with pain and without palpable crepitus  Touchdown sign: limited due to pain  Forward flexion 140/160°  Backward extension 30/60°  Lateral abduction 80/120°  External rotation (arm adducted): 80/80°  External rotation (arm abducted): 90/90°  Internal rotation (arm adducted): L5 level  Rotator cuffs strength: 5/5 throughout  Empty can test / Supraspinatus: Negative  Belly Press / Subscapularis: Negative  Biceps strength: 5/5 throughout   Speed's test / proximal biceps: Positive  Yergason's test / distal biceps: Positive  Impingement  Hawkin's impingement test: Positive  Neer's impingement test: Positive  Labral instability  [place] test / labrum: Positive  Apprehension test / labrum: Negative  AC joint pain with loading  Forced cross body adduction test / ACJ: Positive      Cecilia Rodas MD  09/29/23      --------------------------------------------------    Procedures

## 2023-10-10 ENCOUNTER — EVALUATION (OUTPATIENT)
Dept: PHYSICAL THERAPY | Facility: REHABILITATION | Age: 48
End: 2023-10-10
Payer: COMMERCIAL

## 2023-10-10 DIAGNOSIS — M25.511 ACUTE PAIN OF RIGHT SHOULDER: Primary | ICD-10-CM

## 2023-10-10 DIAGNOSIS — S49.91XA RIGHT SHOULDER INJURY, INITIAL ENCOUNTER: ICD-10-CM

## 2023-10-10 PROCEDURE — 97110 THERAPEUTIC EXERCISES: CPT | Performed by: PHYSICAL THERAPIST

## 2023-10-10 PROCEDURE — 97161 PT EVAL LOW COMPLEX 20 MIN: CPT | Performed by: PHYSICAL THERAPIST

## 2023-10-10 NOTE — PROGRESS NOTES
PT Evaluation     Today's date: 10/10/2023  Patient name: Ashutosh Urbano  : 1975  MRN: [id number]  Referring provider: Tenzin Juares MD  Dx:   Encounter Diagnosis     ICD-10-CM    1. Acute pain of right shoulder  M25.511       2. Right shoulder injury, initial encounter  S49. 91XA Ambulatory Referral to Physical Therapy                     Assessment  Assessment details: Ashutosh Urbano is a 53 y.o. male presenting with acute R shoulder pain consistent with LHB teninopathy. Primary impairments include decreased ROM, painful arc, weakness, pain and impaired motor control. Will benefit from skilled PT interventions for return to PLOF. HEP was provided and reviewed. Patient is able to complete HEP with good technique and appropriate pain response. Patient expressed understanding of appropriate dosage and frequency of HEP. No additional referral necessary. Impairments: abnormal coordination, abnormal muscle firing, abnormal or restricted ROM, abnormal movement, activity intolerance, impaired balance, impaired physical strength, lacks appropriate home exercise program, pain with function, poor posture  and poor body mechanics  Functional limitations: OH activity, pushing, pulling lifting  Symptom irritability: moderateUnderstanding of Dx/Px/POC: good   Prognosis: good    Goals    Short Term Goals: In 3 weeks, the patient will:  1. Full pain free flexion ROM  2. SH flexion mmt to 3+/5  3. Supervision with HEP for self care    Long Term Goals: In 6 weeks, the patient will:  1. 5/5 all planes mmt  2. FOTO to greater than predicted value  3.  Independent with HEP for selfcare      Plan  Patient would benefit from: skilled physical therapy  Planned therapy interventions: joint mobilization, manual therapy, massage, Bill taping, neuromuscular re-education, patient education, postural training, self care, strengthening, stretching, therapeutic activities, therapeutic exercise, therapeutic training, graded exercise, graded activity, home exercise program, flexibility, functional ROM exercises, balance and activity modification  Frequency: 2x week  Duration in weeks: 6  Treatment plan discussed with: patient        Subjective  Pain Location: R shoulder   Pain Intensity: 3/10 at present, nagging,   LATRELL: blocked football during [employer] event, uable to move arm for about 6 days, has improved substantially in the last week. DOI: 2 weeks  Provoked by: OH activity, sleeping arm under pillow,   Eased Positions: topical analgesic, NSAIDs, rest  Constitutional S/S: denies   Dominant Hand: Right  Occupation:[employer]  Goals: reduce pain return to PLOF    Objective  Red Flags: none    Standing         Head Position x Protracted  Neutral  Retracted   Scapular Position x Protracted  Neutral  Retracted   Thoracic Spine x Inc Kyphosis  Neutral     Lumbar Spine  Inc Lordosis  Neutral x Dec Lordosis   Pelvis  Anterior Tilt  Neutral x Posterior Tilt     Strength and ROM evaluated B from a regional biomechanical perspective and values relevant to this episode recorded in table below    ROM: Goniometric measurement revealed the following findings. Shoulder ROM Right Left   Flexion 170 p arc WNL   Abduction 175 180   ER WNL WNL   IR WNL WNL          Strength: MMT revealed the following findings.   Joint Motion Right Left   Sh. Flexion 3-/5 5/5   Sh. Abduction 4/5 5/5   Sh. ER 5/5 5/5   Sh. IR 5/5 5/5   Shoulder extension 5/5 5/5   Shoulder horizontal ABD 5/5 5/5          Additional Assessments:  Palpation: TTP LHB  Joint mobility: Decreased T/S mobility, GH inferior glides    Shoulder Specific Special Tests:                                                                    Test / Measure  Right 10/10/2023   Jag p   Painful Arc p   Infraspinatus Strength Test n   Drop Arm n   Supraspinatus (empty can) n   Neer p   Sulcus  n   Biceps Load II n   Shaniqua Relocation n   Clunk n        Precautions: DMII,    Pertinent Findings:                                                    POC End Date: 11/21/23                                                                                                 Test / Measure  10/10/2023   FOTO (77) 47   SH Flexion  P arc   SH Flexion MMT 3-/5     Manuals 10/10                   Neuro Re-Ed                                Ther Ex    HEP  8'                               Ther Activity            Gait Training            Modalities

## 2023-10-12 ENCOUNTER — OFFICE VISIT (OUTPATIENT)
Dept: PHYSICAL THERAPY | Facility: REHABILITATION | Age: 48
End: 2023-10-12
Payer: COMMERCIAL

## 2023-10-12 DIAGNOSIS — S49.91XA RIGHT SHOULDER INJURY, INITIAL ENCOUNTER: ICD-10-CM

## 2023-10-12 DIAGNOSIS — M25.511 ACUTE PAIN OF RIGHT SHOULDER: Primary | ICD-10-CM

## 2023-10-12 PROCEDURE — 97112 NEUROMUSCULAR REEDUCATION: CPT | Performed by: PHYSICAL THERAPIST

## 2023-10-12 PROCEDURE — 97110 THERAPEUTIC EXERCISES: CPT | Performed by: PHYSICAL THERAPIST

## 2023-10-12 PROCEDURE — 97140 MANUAL THERAPY 1/> REGIONS: CPT | Performed by: PHYSICAL THERAPIST

## 2023-10-12 NOTE — PROGRESS NOTES
Daily Note     Today's date: 10/12/2023  Patient name: Mohan Juarez  : 1975  MRN: [id number]  Referring provider: Pauline Klein MD  Dx:   Encounter Diagnosis     ICD-10-CM    1. Acute pain of right shoulder  M25.511       2. Right shoulder injury, initial encounter  S49. 91XA           Start Time: 5627  Stop Time: 4506  Total time in clinic (min): 45 minutes    Subjective: Steady progress, reduced pain and improved function. Objective: See treatment diary below    Assessment: Tolerated treatment well. Patient would benefit from continued PT. Demonstrates improved ROM and reduced pain. Cues provided for reduced periscapular elevation. Plan: Continue per plan of care.       Precautions: DMII,    Pertinent Findings:                                                    POC End Date: 23                                                                                                 Test / Measure  10/10/2023   FOTO (77) 47   SH Flexion  P arc   SH Flexion MMT 3-/5     Manuals 10/10 10/12   SH PROM  4' KS   Inf glides  G3-4 KS   RS 90  KS 3x30"        Neuro Re-Ed     ISO abd +felxion  2x15 otb   SRER  2x15 otb   SH flexion  2x15 3#   SH ABD  2x15 3#   D1 ext  3x10 gtb   Face Pull  3x10 btb        Ther Ex     HEP  8'    UBE  4/4                                 Ther Activity               Gait Training               Modalities [id number]

## 2023-10-20 ENCOUNTER — OFFICE VISIT (OUTPATIENT)
Dept: ENDOCRINOLOGY | Facility: CLINIC | Age: 48
End: 2023-10-20

## 2023-10-20 VITALS
SYSTOLIC BLOOD PRESSURE: 124 MMHG | WEIGHT: 188.4 LBS | HEIGHT: 69 IN | DIASTOLIC BLOOD PRESSURE: 86 MMHG | BODY MASS INDEX: 27.91 KG/M2

## 2023-10-20 DIAGNOSIS — E11.9 TYPE 2 DIABETES MELLITUS WITHOUT COMPLICATION, WITHOUT LONG-TERM CURRENT USE OF INSULIN (HCC): ICD-10-CM

## 2023-10-20 DIAGNOSIS — I10 HYPERTENSION, UNSPECIFIED TYPE: ICD-10-CM

## 2023-10-20 DIAGNOSIS — E11.65 TYPE 2 DIABETES MELLITUS WITH HYPERGLYCEMIA, WITHOUT LONG-TERM CURRENT USE OF INSULIN (HCC): Primary | ICD-10-CM

## 2023-10-20 DIAGNOSIS — E78.2 MIXED HYPERLIPIDEMIA: ICD-10-CM

## 2023-10-20 LAB — SL AMB POCT HEMOGLOBIN AIC: 7.7 (ref ?–6.5)

## 2023-10-20 NOTE — ASSESSMENT & PLAN NOTE
Big improvement from prior (last A1C 11.7). Goal hgbA1C < 7  - continue current regimen  - discussed with patient he would benefit from switching [facility] to [facility] however he does not want to lose weight and would like to continue on his same medications. He has plans to increase exercise and is continuing to monitor his diet.    - repeat hgbA1C in 3 months along with routine lab work which has been ordered    Lab Results   Component Value Date    HGBA1C 7.7 (A) 10/20/2023

## 2023-10-20 NOTE — PROGRESS NOTES
Established Patient Progress Note    CC: Follow up for type 2 diabetes mellitus    Impression & Plan:    Problem List Items Addressed This Visit          Endocrine    Type 2 diabetes mellitus with hyperglycemia, without long-term current use of insulin ([address]) - Primary     Big improvement from prior (last A1C 11.7). Goal hgbA1C < 7  - continue current regimen  - discussed with patient he would benefit from switching [facility] to [facility] however he does not want to lose weight and would like to continue on his same medications. He has plans to increase exercise and is continuing to monitor his diet. - repeat hgbA1C in 3 months along with routine lab work which has been ordered    Lab Results   Component Value Date    HGBA1C 7.7 (A) 10/20/2023             Cardiovascular and Mediastinum    Hypertension     Stable in office  - continue current regimen            Other    Mixed hyperlipidemia     Lipid panel has been ordered- pending in system  - continue with statin          Other Visit Diagnoses       Type 2 diabetes mellitus without complication, without long-term current use of insulin (HCC)        Relevant Orders    POCT hemoglobin A1c (Completed)    HEMOGLOBIN A1C W/ EAG ESTIMATION Lab Collect            Orders Placed This Encounter   Procedures    HEMOGLOBIN A1C W/ EAG ESTIMATION Lab Collect     Standing Status:   Future     Standing Expiration Date:   10/20/2024    POCT hemoglobin A1c       History of Present Illness:   Bashir Benavides is a 53 y.o. male with a history of type 2 diabetes, hypertension, hyperlipidemia. Complications:  neuropathy. Last A1C was 7.7. Home glucose monitoring: home glucose monitor     He is accompanied to the visit by his wife. No complaints today.     Hypoglycemia: no  Recent hospitalizations or illnesses: no  Problems with current regimen: no    Home blood glucose readings: uploaded into media  Morning fastin to 247  Before bedtime: 103 to 420     Current regimen:   Jardiance 25mg daily  Glipizide XR 10mg with dinner  Metformin 1g twice daily  Tradjenta 5 mg daily     Last Eye Exam: needs to schedule  Last Foot Exam: UTD, 23    ACE/ARB: lisinopril  Has hyperlipidemia: Taking atorvastatin        Patient Active Problem List   Diagnosis    Type 2 diabetes mellitus with hyperglycemia, without long-term current use of insulin (HCC)    Mixed hyperlipidemia    Mass of hand, left    Mass of left hand    Right leg paresthesias    Hypertension      Past Medical History:   Diagnosis Date    Diabetes mellitus ([address])     Hyperlipidemia       Past Surgical History:   Procedure Laterality Date    HERNIA REPAIR      as baby    MASS EXCISION Left 2020    Procedure: left hand mass excision;  Surgeon: Sandra Underwood MD;  Location: [facility];  Service: Orthopedics      Family History   Problem Relation Age of Onset    Hypertension Mother     Diabetes Father      Social History     Tobacco Use    Smoking status: Former     Packs/day: 0.25     Years: 30.00     Total pack years: 7.50     Types: Cigarettes     Quit date: 2022     Years since quittin.8    Smokeless tobacco: Never    Tobacco comments:     1 cigarrette    Substance Use Topics    Alcohol use: Yes     Comment: Social     No Known Allergies      Current Outpatient Medications:     atorvastatin (LIPITOR) 20 mg tablet, Take 1 tablet (20 mg total) by mouth daily, Disp: 90 tablet, Rfl: 1    Blood Glucose Monitoring Suppl (OneTouch Verio) w/Device KIT, Use to test 1-2x per day, Disp: 1 kit, Rfl: 1    dicyclomine (BENTYL) 20 mg tablet, Take 1 tablet (20 mg total) by mouth every 6 (six) hours as needed (Abdominal cramping), Disp: 30 tablet, Rfl: 0    escitalopram (LEXAPRO) 10 mg tablet, Take 1 tablet (10 mg total) by mouth daily, Disp: 90 tablet, Rfl: 1    famotidine (PEPCID) 20 mg tablet, Take 1 tablet (20 mg total) by mouth 2 (two) times a day, Disp: 180 tablet, Rfl: 1    gabapentin (NEURONTIN) 300 mg capsule, Take 1 capsule (300 mg total) by mouth daily at bedtime, Disp: 90 capsule, Rfl: 1    glipiZIDE (GLUCOTROL XL) 10 mg 24 hr tablet, Take 1 tablet (10 mg total) by mouth daily, Disp: 90 tablet, Rfl: 1    glucose blood (OneTouch Verio) test strip, Use to test 1-2x daily, Disp: 50 strip, Rfl: 4    Jardiance 25 MG TABS, TAKE 1 TABLET BY MOUTH EVERY DAY IN THE MORNING, Disp: 90 tablet, Rfl: 1    linaGLIPtin (Tradjenta) 5 MG TABS, Take 5 mg by mouth daily, Disp: 90 tablet, Rfl: 1    lisinopril (ZESTRIL) 10 mg tablet, Take 1 tablet (10 mg total) by mouth daily, Disp: 90 tablet, Rfl: 1    meloxicam (Mobic) 15 mg tablet, Take 1 tablet (15 mg total) by mouth daily as needed for mild pain, Disp: 14 tablet, Rfl: 0    metFORMIN (GLUCOPHAGE-XR) 500 mg 24 hr tablet, Take 2 tablets (1,000 mg total) by mouth daily with dinner, Disp: 180 tablet, Rfl: 1    ondansetron (ZOFRAN) 4 mg tablet, Take 1 tablet (4 mg total) by mouth every 8 (eight) hours as needed for nausea or vomiting, Disp: 12 tablet, Rfl: 0    ondansetron (ZOFRAN) 4 mg tablet, Take 1 tablet (4 mg total) by mouth every 8 (eight) hours as needed for nausea or vomiting, Disp: 20 tablet, Rfl: 0    OneTouch Delica Lancets 39T MISC, Use to test 1-2x daily, Disp: 100 each, Rfl: 3    pantoprazole (PROTONIX) 40 mg tablet, Take 1 tablet (40 mg total) by mouth daily, Disp: 90 tablet, Rfl: 1    sodium picosulfate, magnesium oxide, citric acid (Clenpiq) oral solution, Please administer according to instructions provided by our office, Disp: 320 mL, Rfl: 0    tadalafil (CIALIS) 5 MG tablet, Take 1 tablet (5 mg total) by mouth daily as needed for erectile dysfunction, Disp: 10 tablet, Rfl: 5    triamcinolone (KENALOG) 0.5 % cream, Apply topically 2 (two) times a day, Disp: 30 g, Rfl: 0    ibuprofen (MOTRIN) 600 mg tablet, Take 1 tablet (600 mg total) by mouth every 6 (six) hours as needed for mild pain (Patient not taking: Reported on 10/20/2023), Disp: 30 tablet, Rfl: 0    lidocaine (Lidoderm) 5 %, Apply 1 patch topically over 12 hours daily Remove & Discard patch within 12 hours or as directed by MD (Patient not taking: Reported on 10/20/2023), Disp: 15 patch, Rfl: 0    methocarbamol (ROBAXIN) 500 mg tablet, Take 1 tablet (500 mg total) by mouth 2 (two) times a day, Disp: 20 tablet, Rfl: 0    Review of Systems   Constitutional:  Negative for chills and fever. HENT:  Negative for ear pain and sore throat. Eyes:  Negative for pain and visual disturbance. Respiratory:  Negative for cough and shortness of breath. Cardiovascular:  Negative for chest pain and palpitations. Gastrointestinal:  Negative for abdominal pain and vomiting. Genitourinary:  Negative for dysuria and hematuria. Musculoskeletal:  Negative for arthralgias and back pain. Skin:  Negative for color change and rash. Neurological:  Negative for seizures and syncope. All other systems reviewed and are negative. Physical Exam:  Body mass index is 27.82 kg/m². /86 (BP Location: Left arm, Patient Position: Sitting, Cuff Size: Adult)   Ht 5' 9" (1.753 m)   Wt 85.5 kg (188 lb 6.4 oz)   BMI 27.82 kg/m²    Wt Readings from Last 3 Encounters:   10/20/23 85.5 kg (188 lb 6.4 oz)   09/29/23 82.1 kg (181 lb)   08/02/23 82.3 kg (181 lb 6.4 oz)       Physical Exam  Vitals reviewed. Constitutional:       Appearance: Normal appearance. HENT:      Head: Normocephalic and atraumatic. Cardiovascular:      Rate and Rhythm: Normal rate. Heart sounds: Normal heart sounds. Pulmonary:      Effort: Pulmonary effort is normal.      Breath sounds: Normal breath sounds. Neurological:      Mental Status: He is alert and oriented to person, place, and time.    Psychiatric:         Mood and Affect: Mood normal.         Behavior: Behavior normal.       Diabetic Foot Exam    Labs:   Lab Results   Component Value Date    HGBA1C 7.7 (A) 10/20/2023    HGBA1C 11.7 (H) 06/09/2023    HGBA1C 13.4 (A) 05/25/2023     Lab Results   Component Value Date CREATININE 0.84 06/09/2023    CREATININE 0.79 06/04/2023    CREATININE 0.88 03/31/2022    BUN 9 06/09/2023    K 4.0 06/09/2023    CL 96 06/09/2023    CO2 29 06/09/2023     eGFR   Date Value Ref Range Status   06/09/2023 105 ml/min/1.73sq m Final     Lab Results   Component Value Date    HDL 51 06/09/2023    TRIG 184 (H) 06/09/2023     Lab Results   Component Value Date    ALT 15 06/09/2023    AST 11 (L) 06/09/2023    ALKPHOS 69 06/09/2023     Lab Results   Component Value Date    YTX8AABWXJPX 0.929 06/09/2023    YVN7IOTWEGTS 0.672 08/17/2019     No results found for: "Aleta Villegas", "JANEE"      There are no Patient Instructions on file for this visit. Discussed with the patient and all questioned fully answered. He will call me if any problems arise.

## 2023-10-23 ENCOUNTER — OFFICE VISIT (OUTPATIENT)
Dept: FAMILY MEDICINE CLINIC | Facility: CLINIC | Age: 48
End: 2023-10-23
Payer: COMMERCIAL

## 2023-10-23 VITALS
DIASTOLIC BLOOD PRESSURE: 86 MMHG | HEART RATE: 94 BPM | BODY MASS INDEX: 27.64 KG/M2 | OXYGEN SATURATION: 98 % | TEMPERATURE: 96.7 F | SYSTOLIC BLOOD PRESSURE: 126 MMHG | RESPIRATION RATE: 16 BRPM | WEIGHT: 187.2 LBS

## 2023-10-23 DIAGNOSIS — E11.65 TYPE 2 DIABETES MELLITUS WITH HYPERGLYCEMIA, WITHOUT LONG-TERM CURRENT USE OF INSULIN (HCC): Primary | ICD-10-CM

## 2023-10-23 DIAGNOSIS — E78.2 MIXED HYPERLIPIDEMIA: ICD-10-CM

## 2023-10-23 DIAGNOSIS — L91.8 SKIN TAG, ACQUIRED: ICD-10-CM

## 2023-10-23 DIAGNOSIS — I10 HYPERTENSION, UNSPECIFIED TYPE: ICD-10-CM

## 2023-10-23 DIAGNOSIS — N52.9 ERECTILE DYSFUNCTION, UNSPECIFIED ERECTILE DYSFUNCTION TYPE: ICD-10-CM

## 2023-10-23 PROCEDURE — 99214 OFFICE O/P EST MOD 30 MIN: CPT | Performed by: FAMILY MEDICINE

## 2023-10-23 PROCEDURE — 11200 RMVL SKIN TAGS UP TO&INC 15: CPT | Performed by: FAMILY MEDICINE

## 2023-10-23 NOTE — ASSESSMENT & PLAN NOTE
Lab Results   Component Value Date    HGBA1C 7.7 (A) 10/20/2023     A1c greatly improved   Endo following   Congratulated.    Continue metformin 100 mg BID, tradjenta 5 mg daily, [place] 25 mg daily, and glipizide 10 mg daily  Saw opthalmology but pt states the diabetic eye exam not performed

## 2023-10-23 NOTE — PROGRESS NOTES
Assessment/Plan:       Problem List Items Addressed This Visit     Type 2 diabetes mellitus with hyperglycemia, without long-term current use of insulin ([address]) - Primary       Lab Results   Component Value Date    HGBA1C 7.7 (A) 10/20/2023     A1c greatly improved   Endo following   Congratulated. Continue metformin 100 mg BID, tradjenta 5 mg daily, Magdaleno 25 mg daily, and glipizide 10 mg daily  Saw opthalmology but pt states the diabetic eye exam not performed           Mixed hyperlipidemia     Lipid panel already ordered and will do BW this weekend   Continue lipitor 20 mg daily          Hypertension     BP within goal at 126/86 daily  Continue lisinopril 10 mg daily         Other Visit Diagnoses     Erectile dysfunction, unspecified erectile dysfunction type        Tadalafil 5-10 mgf inffective. Will trial avanfil 50 mg prn. If ineffective, refer to urology. Relevant Medications    Avanafil 50 MG TABS    Skin tag, acquired        Skin tag present on the left zigmatic arch. Successfully removed and bleeding controlled with silver nitrate    Relevant Orders    Skin tag removal (Completed)            Subjective:      Patient ID: Ozzie Delgado is a 53 y.o. male with history of DM and HTN seen today for follow up. Doing well. Saw endo recently and A1c came down to 7.7. Had an eye exam 2 months ago and prescribed new glasses. Was told the diabetic eye exam would be done a later date but is unsure. Does plans to schedule another appt to follow up for eye exam. Cialis 5 mg is ineffective. Took 2 tabs one night and didn't notice a difference. Pt also asking to remove skin tag on the face.      Skin tag removal    Date/Time: 10/23/2023 4:00 PM    Performed by: Sixto Marrero MD  Authorized by: Sixto Marrero MD  Universal Protocol:  Timeout called at: 10/23/2023 4:30 PM.  Patient understanding: patient states understanding of the procedure being performed  Patient identity confirmed: verbally with patient      Procedure Details - Skin Tag Destruction:     Up to 15      Body area:  Head/neck    Head/neck location:  L cheek    Malignancy: benign lesion      Destruction method: scissors used for extraction      Cosmetic?: Yes          The following portions of the patient's history were reviewed and updated as appropriate: He  has a past medical history of Diabetes mellitus ([address]) and Hyperlipidemia. He   Patient Active Problem List    Diagnosis Date Noted   • Hypertension 08/02/2023   • Right leg paresthesias 07/22/2021   • Mass of left hand 10/28/2019   • Mass of hand, left 08/20/2019   • Mixed hyperlipidemia 07/17/2019   • Type 2 diabetes mellitus with hyperglycemia, without long-term current use of insulin ([address]) 07/23/2018     He  has a past surgical history that includes Hernia repair and Mass excision (Left, 1/14/2020). His family history includes Diabetes in his father; Hypertension in his mother. He  reports that he quit smoking about 10 months ago. His smoking use included cigarettes. He has a 7.50 pack-year smoking history. He has never used smokeless tobacco. He reports current alcohol use. He reports that he does not use drugs.   Current Outpatient Medications on File Prior to Visit   Medication Sig   • atorvastatin (LIPITOR) 20 mg tablet Take 1 tablet (20 mg total) by mouth daily   • Blood Glucose Monitoring Suppl (OneTouch Verio) w/Device KIT Use to test 1-2x per day   • dicyclomine (BENTYL) 20 mg tablet Take 1 tablet (20 mg total) by mouth every 6 (six) hours as needed (Abdominal cramping)   • escitalopram (LEXAPRO) 10 mg tablet Take 1 tablet (10 mg total) by mouth daily   • famotidine (PEPCID) 20 mg tablet Take 1 tablet (20 mg total) by mouth 2 (two) times a day   • gabapentin (NEURONTIN) 300 mg capsule Take 1 capsule (300 mg total) by mouth daily at bedtime   • glipiZIDE (GLUCOTROL XL) 10 mg 24 hr tablet Take 1 tablet (10 mg total) by mouth daily   • glucose blood (OneTouch Verio) test strip Use to test 1-2x daily   • Jardiance 25 MG TABS TAKE 1 TABLET BY MOUTH EVERY DAY IN THE MORNING   • linaGLIPtin (Tradjenta) 5 MG TABS Take 5 mg by mouth daily   • lisinopril (ZESTRIL) 10 mg tablet Take 1 tablet (10 mg total) by mouth daily   • meloxicam (Mobic) 15 mg tablet Take 1 tablet (15 mg total) by mouth daily as needed for mild pain   • metFORMIN (GLUCOPHAGE-XR) 500 mg 24 hr tablet Take 2 tablets (1,000 mg total) by mouth daily with dinner   • methocarbamol (ROBAXIN) 500 mg tablet Take 1 tablet (500 mg total) by mouth 2 (two) times a day   • ondansetron (ZOFRAN) 4 mg tablet Take 1 tablet (4 mg total) by mouth every 8 (eight) hours as needed for nausea or vomiting   • ondansetron (ZOFRAN) 4 mg tablet Take 1 tablet (4 mg total) by mouth every 8 (eight) hours as needed for nausea or vomiting   • OneTouch Delica Lancets 53Z MISC Use to test 1-2x daily   • pantoprazole (PROTONIX) 40 mg tablet Take 1 tablet (40 mg total) by mouth daily   • sodium picosulfate, magnesium oxide, citric acid (Clenpiq) oral solution Please administer according to instructions provided by our office   • triamcinolone (KENALOG) 0.5 % cream Apply topically 2 (two) times a day   • [DISCONTINUED] tadalafil (CIALIS) 5 MG tablet Take 1 tablet (5 mg total) by mouth daily as needed for erectile dysfunction   • ibuprofen (MOTRIN) 600 mg tablet Take 1 tablet (600 mg total) by mouth every 6 (six) hours as needed for mild pain (Patient not taking: Reported on 10/20/2023)   • lidocaine (Lidoderm) 5 % Apply 1 patch topically over 12 hours daily Remove & Discard patch within 12 hours or as directed by MD (Patient not taking: Reported on 10/20/2023)     No current facility-administered medications on file prior to visit. He has No Known Allergies. .    Review of Systems      Objective:      /86   Pulse 94   Temp (!) 96.7 °F (35.9 °C)   Resp 16   Wt 84.9 kg (187 lb 3.2 oz)   SpO2 98%   BMI 27.64 kg/m²          Physical Exam  Vitals reviewed. Constitutional:       General: He is not in acute distress. Appearance: Normal appearance. He is not ill-appearing or toxic-appearing. HENT:      Head: Normocephalic and atraumatic. Mouth/Throat:      Mouth: Mucous membranes are moist.   Eyes:      Extraocular Movements: Extraocular movements intact. Cardiovascular:      Rate and Rhythm: Normal rate and regular rhythm. Heart sounds: No murmur heard. Pulmonary:      Effort: Pulmonary effort is normal.      Breath sounds: Normal breath sounds. Musculoskeletal:      Right lower leg: No edema. Left lower leg: No edema. Skin:     General: Skin is warm. Comments: Skin tag on the left cheek near the eye   Neurological:      Mental Status: He is alert and oriented to person, place, and time.    Psychiatric:         Behavior: Behavior normal.

## 2023-10-24 ENCOUNTER — TELEPHONE (OUTPATIENT)
Age: 48
End: 2023-10-24

## 2023-10-24 DIAGNOSIS — E11.65 TYPE 2 DIABETES MELLITUS WITH HYPERGLYCEMIA, WITHOUT LONG-TERM CURRENT USE OF INSULIN (HCC): ICD-10-CM

## 2023-10-24 DIAGNOSIS — F33.41 RECURRENT MAJOR DEPRESSIVE DISORDER, IN PARTIAL REMISSION (HCC): ICD-10-CM

## 2023-10-24 DIAGNOSIS — R10.13 EPIGASTRIC PAIN: ICD-10-CM

## 2023-10-24 DIAGNOSIS — I10 PRIMARY HYPERTENSION: ICD-10-CM

## 2023-10-24 DIAGNOSIS — K52.9 GASTROENTERITIS: ICD-10-CM

## 2023-10-24 DIAGNOSIS — E78.2 MIXED HYPERLIPIDEMIA: ICD-10-CM

## 2023-10-24 RX ORDER — ESCITALOPRAM OXALATE 10 MG/1
10 TABLET ORAL DAILY
Qty: 90 TABLET | Refills: 1 | Status: SHIPPED | OUTPATIENT
Start: 2023-10-24

## 2023-10-24 RX ORDER — PANTOPRAZOLE SODIUM 40 MG/1
40 TABLET, DELAYED RELEASE ORAL DAILY
Qty: 90 TABLET | Refills: 1 | Status: SHIPPED | OUTPATIENT
Start: 2023-10-24

## 2023-10-24 RX ORDER — ATORVASTATIN CALCIUM 20 MG/1
20 TABLET, FILM COATED ORAL DAILY
Qty: 90 TABLET | Refills: 1 | Status: SHIPPED | OUTPATIENT
Start: 2023-10-24

## 2023-10-24 RX ORDER — METFORMIN HYDROCHLORIDE 500 MG/1
TABLET, EXTENDED RELEASE ORAL
Qty: 180 TABLET | Refills: 1 | Status: SHIPPED | OUTPATIENT
Start: 2023-10-24

## 2023-10-24 RX ORDER — LISINOPRIL 10 MG/1
10 TABLET ORAL DAILY
Qty: 90 TABLET | Refills: 1 | Status: SHIPPED | OUTPATIENT
Start: 2023-10-24

## 2023-10-24 RX ORDER — GLIPIZIDE 10 MG/1
10 TABLET, FILM COATED, EXTENDED RELEASE ORAL DAILY
Qty: 90 TABLET | Refills: 1 | Status: SHIPPED | OUTPATIENT
Start: 2023-10-24

## 2023-10-24 NOTE — TELEPHONE ENCOUNTER
FYI: patient called to confirm that refill for Avanafil 50 MG TABS [[id number]]  was sent the [facility] pharmacy on file.

## 2023-10-30 ENCOUNTER — APPOINTMENT (OUTPATIENT)
Dept: PHYSICAL THERAPY | Facility: REHABILITATION | Age: 48
End: 2023-10-30
Payer: COMMERCIAL

## 2023-11-12 DIAGNOSIS — N52.9 ERECTILE DYSFUNCTION, UNSPECIFIED ERECTILE DYSFUNCTION TYPE: ICD-10-CM

## 2023-11-12 DIAGNOSIS — R10.13 DYSPEPSIA: ICD-10-CM

## 2023-11-13 RX ORDER — FAMOTIDINE 20 MG/1
20 TABLET, FILM COATED ORAL 2 TIMES DAILY
Qty: 180 TABLET | Refills: 1 | Status: SHIPPED | OUTPATIENT
Start: 2023-11-13

## 2023-11-13 RX ORDER — AVANAFIL 50 MG/1
TABLET ORAL
Qty: 10 TABLET | Refills: 0 | Status: SHIPPED | OUTPATIENT
Start: 2023-11-13 | End: 2023-11-20

## 2023-11-20 ENCOUNTER — TELEPHONE (OUTPATIENT)
Dept: OTHER | Facility: HOSPITAL | Age: 48
End: 2023-11-20

## 2023-11-20 ENCOUNTER — TELEPHONE (OUTPATIENT)
Age: 48
End: 2023-11-20

## 2023-11-20 NOTE — TELEPHONE ENCOUNTER
PA for stendra started via [facility].  KEY: [id number]. Will submit clinicals when system updates.

## 2023-11-20 NOTE — TELEPHONE ENCOUNTER
DUPLICATE PA REQUEST FOR Avanafil (Stendra) 50 MG TABS, SEE TELEPHONE ENCOUNTER FROM 10- REGARDING THIS MEDICATION PA.

## 2023-11-20 NOTE — TELEPHONE ENCOUNTER
Provider notes   Insurance is requesting a PA for stendra which has worked well for patient. Told she could only get 18 tabs in a 75 day period. Adelita shukla forwards to PA team to address.  Has tried Viagra and Cialis int he past         Avanafil (Stendra) 50 MG TABS   Take 1 tablet (50 mg total) by mouth daily as needed (erectile dysfunction)   Dispense: 8 tablet     Refills: 0

## 2024-01-04 DIAGNOSIS — N52.9 ERECTILE DYSFUNCTION, UNSPECIFIED ERECTILE DYSFUNCTION TYPE: ICD-10-CM

## 2024-01-04 RX ORDER — AVANAFIL 50 MG/1
50 TABLET ORAL DAILY PRN
Qty: 10 TABLET | Refills: 3 | Status: SHIPPED | OUTPATIENT
Start: 2024-01-04

## 2024-01-09 DIAGNOSIS — E11.65 TYPE 2 DIABETES MELLITUS WITH HYPERGLYCEMIA, WITHOUT LONG-TERM CURRENT USE OF INSULIN (HCC): ICD-10-CM

## 2024-01-09 RX ORDER — BLOOD SUGAR DIAGNOSTIC
STRIP MISCELLANEOUS
Qty: 200 STRIP | Refills: 5 | Status: SHIPPED | OUTPATIENT
Start: 2024-01-09

## 2024-01-09 NOTE — TELEPHONE ENCOUNTER
Reason for call:   [x] Refill   [] Prior Auth  [] Other:     Office:   [x] PCP/Provider - Jelani Cobian MD   [] Specialty/Provider -     Medication: one touch verio test strips       Pharmacy: [facility]/pharmacy #[id number] - [place], [place] - [address]     Does the patient have enough for 3 days?   [] Yes   [x] No - Send as HP to POD

## 2024-01-10 ENCOUNTER — TELEPHONE (OUTPATIENT)
Age: 49
End: 2024-01-10

## 2024-01-10 DIAGNOSIS — E11.65 TYPE 2 DIABETES MELLITUS WITH HYPERGLYCEMIA, WITHOUT LONG-TERM CURRENT USE OF INSULIN (HCC): ICD-10-CM

## 2024-01-10 RX ORDER — LINAGLIPTIN 5 MG/1
5 TABLET, FILM COATED ORAL DAILY
Qty: 30 TABLET | Refills: 5 | Status: SHIPPED | OUTPATIENT
Start: 2024-01-10 | End: 2024-01-11

## 2024-01-10 NOTE — TELEPHONE ENCOUNTER
PA for Tradjenta    Submitted via  []CMM-KEY    [x][employer]-Case ID # [id number]   []Faxed to plan   []Other website    []Phone call Case ID #      Office notes sent, clinical questions answered. Awaiting determination

## 2024-01-11 DIAGNOSIS — E11.65 TYPE 2 DIABETES MELLITUS WITH HYPERGLYCEMIA, WITHOUT LONG-TERM CURRENT USE OF INSULIN (HCC): ICD-10-CM

## 2024-01-11 NOTE — TELEPHONE ENCOUNTER
PA for Tradjenta 5 mg Denied    Reason:      Message sent to office clinical pool Yes    Denial letter scanned into Media Yes    Appeal started No

## 2024-01-11 NOTE — TELEPHONE ENCOUNTER
Please inform patient the tradjenta was denied and will send a similar medication within the same class.

## 2024-01-11 NOTE — TELEPHONE ENCOUNTER
Requested medication(s) are due for refill today: Yes  Patient has already received a courtesy refill: No  Other reason request has been forwarded to provider: per protocol. Please see pharmacy comment

## 2024-02-11 DIAGNOSIS — I10 PRIMARY HYPERTENSION: ICD-10-CM

## 2024-02-11 DIAGNOSIS — E78.2 MIXED HYPERLIPIDEMIA: ICD-10-CM

## 2024-02-11 DIAGNOSIS — F33.41 RECURRENT MAJOR DEPRESSIVE DISORDER, IN PARTIAL REMISSION (HCC): ICD-10-CM

## 2024-02-11 DIAGNOSIS — E11.65 TYPE 2 DIABETES MELLITUS WITH HYPERGLYCEMIA, WITHOUT LONG-TERM CURRENT USE OF INSULIN (HCC): ICD-10-CM

## 2024-02-12 RX ORDER — LISINOPRIL 10 MG/1
10 TABLET ORAL DAILY
Qty: 90 TABLET | Refills: 1 | Status: SHIPPED | OUTPATIENT
Start: 2024-02-12

## 2024-02-12 RX ORDER — GLIPIZIDE 10 MG/1
10 TABLET, FILM COATED, EXTENDED RELEASE ORAL DAILY
Qty: 90 TABLET | Refills: 1 | Status: SHIPPED | OUTPATIENT
Start: 2024-02-12

## 2024-02-12 RX ORDER — ESCITALOPRAM OXALATE 10 MG/1
10 TABLET ORAL DAILY
Qty: 90 TABLET | Refills: 1 | Status: SHIPPED | OUTPATIENT
Start: 2024-02-12

## 2024-02-12 RX ORDER — ATORVASTATIN CALCIUM 20 MG/1
20 TABLET, FILM COATED ORAL DAILY
Qty: 90 TABLET | Refills: 1 | Status: SHIPPED | OUTPATIENT
Start: 2024-02-12

## 2024-02-28 DIAGNOSIS — E11.65 TYPE 2 DIABETES MELLITUS WITH HYPERGLYCEMIA, WITHOUT LONG-TERM CURRENT USE OF INSULIN (HCC): ICD-10-CM

## 2024-02-28 RX ORDER — EMPAGLIFLOZIN 25 MG/1
TABLET, FILM COATED ORAL
Qty: 30 TABLET | Refills: 5 | Status: SHIPPED | OUTPATIENT
Start: 2024-02-28

## 2024-03-21 DIAGNOSIS — E11.65 TYPE 2 DIABETES MELLITUS WITH HYPERGLYCEMIA, WITHOUT LONG-TERM CURRENT USE OF INSULIN (HCC): ICD-10-CM

## 2024-03-21 RX ORDER — METFORMIN HYDROCHLORIDE 500 MG/1
TABLET, EXTENDED RELEASE ORAL
Qty: 180 TABLET | Refills: 1 | Status: SHIPPED | OUTPATIENT
Start: 2024-03-21

## 2024-04-05 ENCOUNTER — OFFICE VISIT (OUTPATIENT)
Dept: FAMILY MEDICINE CLINIC | Facility: CLINIC | Age: 49
End: 2024-04-05
Payer: COMMERCIAL

## 2024-04-05 VITALS
BODY MASS INDEX: 27.28 KG/M2 | WEIGHT: 184.2 LBS | RESPIRATION RATE: 16 BRPM | SYSTOLIC BLOOD PRESSURE: 126 MMHG | HEART RATE: 88 BPM | DIASTOLIC BLOOD PRESSURE: 86 MMHG | OXYGEN SATURATION: 97 % | HEIGHT: 69 IN | TEMPERATURE: 97.4 F

## 2024-04-05 DIAGNOSIS — E11.65 TYPE 2 DIABETES MELLITUS WITH HYPERGLYCEMIA, WITHOUT LONG-TERM CURRENT USE OF INSULIN (HCC): ICD-10-CM

## 2024-04-05 DIAGNOSIS — R22.32 MASS OF HAND, LEFT: ICD-10-CM

## 2024-04-05 DIAGNOSIS — M72.0 DUPUYTREN'S CONTRACTURE OF LEFT HAND: ICD-10-CM

## 2024-04-05 DIAGNOSIS — M65.342 TRIGGER RING FINGER OF LEFT HAND: ICD-10-CM

## 2024-04-05 DIAGNOSIS — R68.89 FLU-LIKE SYMPTOMS: Primary | ICD-10-CM

## 2024-04-05 DIAGNOSIS — I10 HYPERTENSION, UNSPECIFIED TYPE: ICD-10-CM

## 2024-04-05 PROCEDURE — 87636 SARSCOV2 & INF A&B AMP PRB: CPT | Performed by: FAMILY MEDICINE

## 2024-04-05 PROCEDURE — 99214 OFFICE O/P EST MOD 30 MIN: CPT | Performed by: FAMILY MEDICINE

## 2024-04-05 NOTE — PROGRESS NOTES
Name: Charly Lugo      : 1975      MRN: [id number]  Encounter Provider: Jelani Cobian MD  Encounter Date: 2024   Encounter department: [facility]    Assessment & Plan     Seen today with flu like symptoms and left palmar hand pain.  NAD, BP acceptable  COVID flu collected. Supportive care. Fluids and work note to allow rest  Due for diabetic follow and IRIS exam which will be scheduled at the next appt  Mass along the palmar tendon and locking/pain may be due to Dupuytren contraction versus trigger finger.   Voltaren for pain  but will need to see hand surgeon for possible injection and PT to improve motility    1. Flu-like symptoms  -     Covid/Flu- Office Collect Normal    2. Trigger ring finger of left hand    3. Mass of hand, left    4. Hypertension, unspecified type    5. Type 2 diabetes mellitus with hyperglycemia, without long-term current use of insulin (HCC)    6. Dupuytren's contracture of left hand           Subjective      48-year-old male with a history of type 2 diabetes, hyperlipidemia, and hypertension seen today with flulike symptoms (nasal congestion, cough, body ache, fatigue, and chills)  Symptoms started 5 days ago  Their other household members were also ill with similar symptoms.  Most of the symptoms have improved but feels extremely tired  States he was sent home from work due to fatigue  Requesting work note  Denies shortness of breath, vomiting, diarrhea or chest tightness   Has tried over-the-counter cough and cold medicine with some relief  Patient also reports a mass on the palmar surface of his left hand with locking/ stiffness when he tries to  extend the finger          Review of Systems   Constitutional:  Positive for chills and fatigue. Negative for fever.   HENT:  Positive for congestion.    Respiratory:  Positive for cough (improving). Negative for shortness of breath and wheezing.    Gastrointestinal: Negative.    Musculoskeletal:   Positive for arthralgias (left hand pain) and myalgias.       Current Outpatient Medications on File Prior to Visit   Medication Sig    atorvastatin (LIPITOR) 20 mg tablet TAKE 1 TABLET BY MOUTH EVERY DAY    Avanafil (Stendra) 50 MG TABS TAKE 1 TABLET (50 MG TOTAL) BY MOUTH DAILY AS NEEDED (ERECTILE DYSFUNCTION)    Blood Glucose Monitoring Suppl (OneTouch Verio) w/Device KIT Use to test 1-2x per day    dicyclomine (BENTYL) 20 mg tablet Take 1 tablet (20 mg total) by mouth every 6 (six) hours as needed (Abdominal cramping)    escitalopram (LEXAPRO) 10 mg tablet TAKE 1 TABLET BY MOUTH EVERY DAY    famotidine (PEPCID) 20 mg tablet TAKE 1 TABLET BY MOUTH TWICE A DAY    gabapentin (NEURONTIN) 300 mg capsule Take 1 capsule (300 mg total) by mouth daily at bedtime    glipiZIDE (GLUCOTROL XL) 10 mg 24 hr tablet TAKE 1 TABLET BY MOUTH EVERY DAY    glucose blood (OneTouch Verio) test strip Use to test 1-2x daily    ibuprofen (MOTRIN) 600 mg tablet Take 1 tablet (600 mg total) by mouth every 6 (six) hours as needed for mild pain    Jardiance 25 MG TABS TAKE 1 TABLET BY MOUTH EVERY DAY IN THE MORNING    lisinopril (ZESTRIL) 10 mg tablet TAKE 1 TABLET BY MOUTH EVERY DAY    meloxicam (Mobic) 15 mg tablet Take 1 tablet (15 mg total) by mouth daily as needed for mild pain    metFORMIN (GLUCOPHAGE-XR) 500 mg 24 hr tablet TAKE 2 TABLETS BY MOUTH DAILY WITH DINNER.    methocarbamol (ROBAXIN) 500 mg tablet Take 1 tablet (500 mg total) by mouth 2 (two) times a day    ondansetron (ZOFRAN) 4 mg tablet Take 1 tablet (4 mg total) by mouth every 8 (eight) hours as needed for nausea or vomiting    OneTouch Delica Lancets 33G MISC Use to test 1-2x daily    pantoprazole (PROTONIX) 40 mg tablet TAKE 1 TABLET BY MOUTH EVERY DAY    sitaGLIPtin (JANUVIA) 50 mg tablet Take 1 tablet (50 mg total) by mouth daily    [DISCONTINUED] triamcinolone (KENALOG) 0.5 % cream Apply topically 2 (two) times a day    lidocaine (Lidoderm) 5 % Apply 1 patch topically  "over 12 hours daily Remove & Discard patch within 12 hours or as directed by MD (Patient not taking: Reported on 10/20/2023)    sodium picosulfate, magnesium oxide, citric acid (Clenpiq) oral solution Please administer according to instructions provided by our office    [DISCONTINUED] ondansetron (ZOFRAN) 4 mg tablet Take 1 tablet (4 mg total) by mouth every 8 (eight) hours as needed for nausea or vomiting (Patient not taking: Reported on 4/5/2024)       Objective     /86 (BP Location: Left arm, Patient Position: Sitting, Cuff Size: Adult)   Pulse 88   Temp (!) 97.4 °F (36.3 °C) (Tympanic)   Resp 16   Ht 5' 9\" (1.753 m)   Wt 83.6 kg (184 lb 3.2 oz)   SpO2 97%   BMI 27.20 kg/m²     Physical Exam  Constitutional:       General: He is not in acute distress.     Appearance: He is ill-appearing (Mildly).      Comments: Tired appearing   HENT:      Head: Normocephalic and atraumatic.      Right Ear: Tympanic membrane normal.      Left Ear: Tympanic membrane normal.      Mouth/Throat:      Mouth: Mucous membranes are moist.   Eyes:      Extraocular Movements: Extraocular movements intact.   Cardiovascular:      Rate and Rhythm: Normal rate and regular rhythm.      Heart sounds: No murmur heard.  Pulmonary:      Effort: Pulmonary effort is normal. No respiratory distress.      Breath sounds: Normal breath sounds. No stridor. No wheezing, rhonchi or rales.   Abdominal:      General: There is no distension.      Palpations: Abdomen is soft. There is no mass.      Tenderness: There is no abdominal tenderness. There is no guarding or rebound.      Hernia: No hernia is present.   Musculoskeletal:      Left hand: Tenderness (palmar tendon along along the ring finger) present. Decreased range of motion.      Comments: Mass    Lymphadenopathy:      Cervical: No cervical adenopathy.   Skin:     General: Skin is warm.   Neurological:      Mental Status: He is oriented to person, place, and time.   Psychiatric:         " Behavior: Behavior normal.       Jelani Cobian MD

## 2024-04-06 LAB
FLUAV RNA RESP QL NAA+PROBE: POSITIVE
FLUBV RNA RESP QL NAA+PROBE: NEGATIVE
SARS-COV-2 RNA RESP QL NAA+PROBE: NEGATIVE

## 2024-04-07 ENCOUNTER — TELEPHONE (OUTPATIENT)
Dept: FAMILY MEDICINE CLINIC | Facility: CLINIC | Age: 49
End: 2024-04-07

## 2024-06-24 DIAGNOSIS — E11.65 TYPE 2 DIABETES MELLITUS WITH HYPERGLYCEMIA, WITHOUT LONG-TERM CURRENT USE OF INSULIN (HCC): ICD-10-CM

## 2024-06-24 RX ORDER — METFORMIN HYDROCHLORIDE 500 MG/1
TABLET, EXTENDED RELEASE ORAL
Qty: 60 TABLET | Refills: 0 | Status: SHIPPED | OUTPATIENT
Start: 2024-06-24

## 2024-07-08 ENCOUNTER — OFFICE VISIT (OUTPATIENT)
Dept: FAMILY MEDICINE CLINIC | Facility: CLINIC | Age: 49
End: 2024-07-08
Payer: COMMERCIAL

## 2024-07-08 VITALS
SYSTOLIC BLOOD PRESSURE: 118 MMHG | HEART RATE: 97 BPM | HEIGHT: 69 IN | OXYGEN SATURATION: 95 % | DIASTOLIC BLOOD PRESSURE: 80 MMHG | RESPIRATION RATE: 16 BRPM | BODY MASS INDEX: 25.56 KG/M2 | WEIGHT: 172.6 LBS | TEMPERATURE: 97.9 F

## 2024-07-08 DIAGNOSIS — E78.2 MIXED HYPERLIPIDEMIA: ICD-10-CM

## 2024-07-08 DIAGNOSIS — N52.9 ERECTILE DYSFUNCTION, UNSPECIFIED ERECTILE DYSFUNCTION TYPE: ICD-10-CM

## 2024-07-08 DIAGNOSIS — E11.65 TYPE 2 DIABETES MELLITUS WITH HYPERGLYCEMIA, WITHOUT LONG-TERM CURRENT USE OF INSULIN (HCC): ICD-10-CM

## 2024-07-08 DIAGNOSIS — Z00.00 ENCOUNTER FOR ANNUAL PHYSICAL EXAM: Primary | ICD-10-CM

## 2024-07-08 DIAGNOSIS — M65.342 TRIGGER RING FINGER OF LEFT HAND: ICD-10-CM

## 2024-07-08 DIAGNOSIS — B35.1 ONYCHOMYCOSIS: ICD-10-CM

## 2024-07-08 LAB
LEFT EYE DIABETIC RETINOPATHY: ABNORMAL
LEFT EYE IMAGE QUALITY: ABNORMAL
LEFT EYE MACULAR EDEMA: POSITIVE
LEFT EYE OTHER RETINOPATHY: ABNORMAL
RIGHT EYE DIABETIC RETINOPATHY: ABNORMAL
RIGHT EYE IMAGE QUALITY: ABNORMAL
RIGHT EYE MACULAR EDEMA: POSITIVE
RIGHT EYE OTHER RETINOPATHY: ABNORMAL
SEVERITY (EYE EXAM): ABNORMAL
SL AMB POCT HEMOGLOBIN AIC: 12 (ref ?–6.5)

## 2024-07-08 PROCEDURE — 92250 FUNDUS PHOTOGRAPHY W/I&R: CPT | Performed by: FAMILY MEDICINE

## 2024-07-08 PROCEDURE — 83036 HEMOGLOBIN GLYCOSYLATED A1C: CPT | Performed by: FAMILY MEDICINE

## 2024-07-08 PROCEDURE — 99396 PREV VISIT EST AGE 40-64: CPT | Performed by: FAMILY MEDICINE

## 2024-07-08 RX ORDER — AVANAFIL 50 MG/1
50 TABLET ORAL DAILY PRN
Qty: 18 TABLET | Refills: 3 | Status: SHIPPED | OUTPATIENT
Start: 2024-07-08 | End: 2024-07-09 | Stop reason: SDUPTHER

## 2024-07-08 RX ORDER — METFORMIN HYDROCHLORIDE 500 MG/1
1000 TABLET, EXTENDED RELEASE ORAL 2 TIMES DAILY WITH MEALS
Qty: 120 TABLET | Refills: 2 | Status: SHIPPED | OUTPATIENT
Start: 2024-07-15

## 2024-07-08 RX ORDER — CICLOPIROX 2.28 G/ML
SOLUTION TOPICAL
Qty: 6.6 ML | Refills: 1 | Status: SHIPPED | OUTPATIENT
Start: 2024-07-08 | End: 2024-09-06

## 2024-07-08 NOTE — PROGRESS NOTES
Diabetic Foot Exam    Patient's shoes and socks removed.    Right Foot/Ankle   Right Foot Inspection  Skin Exam: skin normal and skin intact. No dry skin, no warmth, no callus, no erythema, no maceration, no abnormal color, no pre-ulcer, no ulcer and no callus.     Toe Exam: ROM and strength within normal limits and tenderness (right great toe nail).     Sensory   Vibration: intact  Proprioception: intact  Monofilament testing: intact    Vascular  Capillary refills: < 3 seconds  The right DP pulse is 2+. The right PT pulse is 1+.     Left Foot/Ankle  Left Foot Inspection  Skin Exam: Skin not intact, no dry skin, no warmth, no erythema, no maceration, normal color, no pre-ulcer and no ulcer.     Toe Exam: ROM and strength within normal limits.     Sensory   Vibration: intact  Proprioception: intact  Monofilament testing: intact    Vascular  Capillary refills: < 3 seconds  The left DP pulse is 2+. The left PT pulse is 1+.     Assign Risk Category  No deformity present  No loss of protective sensation  No weak pulses  Risk: 0        Adult Annual Physical  Name: Charly Lugo      : 1975      MRN: [id number]  Encounter Provider: Jelani Cobian MD  Encounter Date: 2024   Encounter department: [facility]    Assessment & Plan   1. Encounter for annual physical exam  Comments:  UTD for colon cancer screening  2. Type 2 diabetes mellitus with hyperglycemia, without long-term current use of insulin (HCC)  -     POCT hemoglobin A1c  -     Lipid panel; Future  -     Comprehensive metabolic panel; Future  -     Albumin / creatinine urine ratio  -     metFORMIN (GLUCOPHAGE-XR) 500 mg 24 hr tablet; Take 2 tablets (1,000 mg total) by mouth 2 (two) times a day with meals Do not start before July 15, 2024.  -     IRIS Diabetic eye exam  3. Mixed hyperlipidemia  -     Lipid panel; Future  -     Comprehensive metabolic panel; Future  -     Albumin / creatinine urine ratio  4. Erectile dysfunction,  unspecified erectile dysfunction type  Comments:  Avanfil 50 mg refilled.  Orders:  -     Avanafil (Stendra) 50 MG TABS; Take 1 tablet (50 mg total) by mouth daily as needed (erectile dysfunction)  5. Onychomycosis  Comments:  With onycholysis of the right great toe. Topical antifungal ordered to apply qhs  Orders:  -     ciclopirox (Ciclodan) 8 % solution; Apply topically daily at bedtime  6. Type 2 diabetes mellitus with hyperglycemia, without long-term current use of insulin ([facility])  Comments:  Uncontrolled. A1c 12. Admits to drinking soda and watered down juices. Already on glipizide 10 gm, jardiance 25 mg, and metformin 1000 mg daily. Increase to 1000 mg BID but will need labs prior. Diabetic eye and foot exam completed today. Onycholysis from wearing steel toe shoes. Has since requested not to use them. Antifungal lacquer ordered.  Orders:  -     POCT hemoglobin A1c  -     Lipid panel; Future  -     Comprehensive metabolic panel; Future  -     Albumin / creatinine urine ratio  -     metFORMIN (GLUCOPHAGE-XR) 500 mg 24 hr tablet; Take 2 tablets (1,000 mg total) by mouth 2 (two) times a day with meals Do not start before July 15, 2024.  -     IRIS Diabetic eye exam    Immunizations and preventive care screenings were discussed with patient today. Appropriate education was printed on patient's after visit summary.    Counseling:  Injury prevention: discussed safety/seat belts, safety helmets, smoke detectors, carbon dioxide detectors, and smoking near bedding or upholstery.  Sexual health: discussed sexually transmitted diseases, partner selection, use of condoms, avoidance of unintended pregnancy, and contraceptive alternatives.  Exercise: the importance of regular exercise/physical activity was discussed. Recommend exercise 3-5 times per week for at least 30 minutes.          History of Present Illness     Adult Annual Physical:  Patient presents for annual physical. Had eye exam at the ophthalmologist on [address]. Got new glasses   Does urinate a lot but is on jardiance  .     Diet and Physical Activity:  - Diet/Nutrition:. Eating less, drinking soda, and watered down juice  - Exercise: no formal exercise.    General Health:  - Sleep:. waking up every 2-3 hours to urinate but has gotten manny and bale to fall back asleep  - Vision: most recent eye exam < 1 year ago and wears glasses. no blurry vision or floaters  - Dental: regular dental visits. seen 2 months ago     Health:  - History of STDs: no.   - Urinary symptoms: erectile dysfunction.     Review of Systems  Past Medical History   Past Medical History:   Diagnosis Date    Diabetes mellitus (HCC)     Hyperlipidemia      Past Surgical History:   Procedure Laterality Date    HERNIA REPAIR      as baby    MASS EXCISION Left 1/14/2020    Procedure: left hand mass excision;  Surgeon: Anatoly Borden MD;  Location: [facility];  Service: Orthopedics     Family History   Problem Relation Age of Onset    Hypertension Mother     Diabetes Father      Current Outpatient Medications on File Prior to Visit   Medication Sig Dispense Refill    atorvastatin (LIPITOR) 20 mg tablet TAKE 1 TABLET BY MOUTH EVERY DAY 90 tablet 1    Blood Glucose Monitoring Suppl (OneTouch Verio) w/Device KIT Use to test 1-2x per day 1 kit 1    dicyclomine (BENTYL) 20 mg tablet Take 1 tablet (20 mg total) by mouth every 6 (six) hours as needed (Abdominal cramping) 30 tablet 0    escitalopram (LEXAPRO) 10 mg tablet TAKE 1 TABLET BY MOUTH EVERY DAY 90 tablet 1    gabapentin (NEURONTIN) 300 mg capsule Take 1 capsule (300 mg total) by mouth daily at bedtime 90 capsule 1    glipiZIDE (GLUCOTROL XL) 10 mg 24 hr tablet TAKE 1 TABLET BY MOUTH EVERY DAY 90 tablet 1    glucose blood (OneTouch Verio) test strip Use to test 1-2x daily 200 strip 5    ibuprofen (MOTRIN) 600 mg tablet Take 1 tablet (600 mg total) by mouth every 6 (six) hours as needed for mild pain 30 tablet 0    Jardiance 25 MG TABS TAKE 1  TABLET BY MOUTH EVERY DAY IN THE MORNING 30 tablet 5    lisinopril (ZESTRIL) 10 mg tablet TAKE 1 TABLET BY MOUTH EVERY DAY 90 tablet 1    meloxicam (Mobic) 15 mg tablet Take 1 tablet (15 mg total) by mouth daily as needed for mild pain 14 tablet 0    methocarbamol (ROBAXIN) 500 mg tablet Take 1 tablet (500 mg total) by mouth 2 (two) times a day 20 tablet 0    ondansetron (ZOFRAN) 4 mg tablet Take 1 tablet (4 mg total) by mouth every 8 (eight) hours as needed for nausea or vomiting 20 tablet 0    OneTouch Delica Lancets 33G MISC Use to test 1-2x daily 100 each 3    pantoprazole (PROTONIX) 40 mg tablet TAKE 1 TABLET BY MOUTH EVERY DAY 90 tablet 1    [DISCONTINUED] famotidine (PEPCID) 20 mg tablet TAKE 1 TABLET BY MOUTH TWICE A  tablet 1    [DISCONTINUED] metFORMIN (GLUCOPHAGE-XR) 500 mg 24 hr tablet TAKE 2 TABLETS BY MOUTH DAILY WITH DINNER. 60 tablet 0    [DISCONTINUED] sitaGLIPtin (JANUVIA) 50 mg tablet Take 1 tablet (50 mg total) by mouth daily 30 tablet 5    lidocaine (Lidoderm) 5 % Apply 1 patch topically over 12 hours daily Remove & Discard patch within 12 hours or as directed by MD (Patient not taking: Reported on 10/20/2023) 15 patch 0    [DISCONTINUED] Avanafil (Stendra) 50 MG TABS TAKE 1 TABLET (50 MG TOTAL) BY MOUTH DAILY AS NEEDED (ERECTILE DYSFUNCTION) (Patient not taking: Reported on 7/8/2024) 10 tablet 3    [DISCONTINUED] sodium picosulfate, magnesium oxide, citric acid (Clenpiq) oral solution Please administer according to instructions provided by our office (Patient not taking: Reported on 7/8/2024) 320 mL 0     No current facility-administered medications on file prior to visit.   No Known Allergies   Current Outpatient Medications on File Prior to Visit   Medication Sig Dispense Refill    atorvastatin (LIPITOR) 20 mg tablet TAKE 1 TABLET BY MOUTH EVERY DAY 90 tablet 1    Blood Glucose Monitoring Suppl (OneTouch Verio) w/Device KIT Use to test 1-2x per day 1 kit 1    dicyclomine (BENTYL) 20 mg  tablet Take 1 tablet (20 mg total) by mouth every 6 (six) hours as needed (Abdominal cramping) 30 tablet 0    escitalopram (LEXAPRO) 10 mg tablet TAKE 1 TABLET BY MOUTH EVERY DAY 90 tablet 1    gabapentin (NEURONTIN) 300 mg capsule Take 1 capsule (300 mg total) by mouth daily at bedtime 90 capsule 1    glipiZIDE (GLUCOTROL XL) 10 mg 24 hr tablet TAKE 1 TABLET BY MOUTH EVERY DAY 90 tablet 1    glucose blood (OneTouch Verio) test strip Use to test 1-2x daily 200 strip 5    ibuprofen (MOTRIN) 600 mg tablet Take 1 tablet (600 mg total) by mouth every 6 (six) hours as needed for mild pain 30 tablet 0    Jardiance 25 MG TABS TAKE 1 TABLET BY MOUTH EVERY DAY IN THE MORNING 30 tablet 5    lisinopril (ZESTRIL) 10 mg tablet TAKE 1 TABLET BY MOUTH EVERY DAY 90 tablet 1    meloxicam (Mobic) 15 mg tablet Take 1 tablet (15 mg total) by mouth daily as needed for mild pain 14 tablet 0    methocarbamol (ROBAXIN) 500 mg tablet Take 1 tablet (500 mg total) by mouth 2 (two) times a day 20 tablet 0    ondansetron (ZOFRAN) 4 mg tablet Take 1 tablet (4 mg total) by mouth every 8 (eight) hours as needed for nausea or vomiting 20 tablet 0    OneTouch Delica Lancets 33G MISC Use to test 1-2x daily 100 each 3    pantoprazole (PROTONIX) 40 mg tablet TAKE 1 TABLET BY MOUTH EVERY DAY 90 tablet 1    [DISCONTINUED] famotidine (PEPCID) 20 mg tablet TAKE 1 TABLET BY MOUTH TWICE A  tablet 1    [DISCONTINUED] metFORMIN (GLUCOPHAGE-XR) 500 mg 24 hr tablet TAKE 2 TABLETS BY MOUTH DAILY WITH DINNER. 60 tablet 0    [DISCONTINUED] sitaGLIPtin (JANUVIA) 50 mg tablet Take 1 tablet (50 mg total) by mouth daily 30 tablet 5    lidocaine (Lidoderm) 5 % Apply 1 patch topically over 12 hours daily Remove & Discard patch within 12 hours or as directed by MD (Patient not taking: Reported on 10/20/2023) 15 patch 0    [DISCONTINUED] Avanafil (Stendra) 50 MG TABS TAKE 1 TABLET (50 MG TOTAL) BY MOUTH DAILY AS NEEDED (ERECTILE DYSFUNCTION) (Patient not taking:  "Reported on 2024) 10 tablet 3    [DISCONTINUED] sodium picosulfate, magnesium oxide, citric acid (Clenpiq) oral solution Please administer according to instructions provided by our office (Patient not taking: Reported on 2024) 320 mL 0     No current facility-administered medications on file prior to visit.      Social History     Tobacco Use    Smoking status: Former     Current packs/day: 0.00     Average packs/day: 0.3 packs/day for 30.0 years (7.5 ttl pk-yrs)     Types: Cigarettes     Start date: 1992     Quit date: 2022     Years since quittin.6    Smokeless tobacco: Never    Tobacco comments:     1 cigarrette    Vaping Use    Vaping status: Former   Substance and Sexual Activity    Alcohol use: Yes     Comment: Social    Drug use: No    Sexual activity: Yes     Partners: Female       Objective     /80 (BP Location: Left arm, Patient Position: Sitting, Cuff Size: Large)   Pulse 97   Temp 97.9 °F (36.6 °C) (Tympanic)   Resp 16   Ht 5' 9\" (1.753 m)   Wt 78.3 kg (172 lb 9.6 oz)   SpO2 95%   BMI 25.49 kg/m²     Physical Exam  Constitutional:       General: He is not in acute distress.     Appearance: Normal appearance. He is not ill-appearing or toxic-appearing.   HENT:      Head: Normocephalic and atraumatic.      Right Ear: Tympanic membrane normal.      Left Ear: Tympanic membrane normal.      Nose: No congestion.      Mouth/Throat:      Mouth: Mucous membranes are moist.   Eyes:      Extraocular Movements: Extraocular movements intact.   Cardiovascular:      Rate and Rhythm: Normal rate and regular rhythm.      Pulses: no weak pulses.           Dorsalis pedis pulses are 2+ on the right side and 2+ on the left side.        Posterior tibial pulses are 1+ on the right side and 1+ on the left side.      Heart sounds: No murmur heard.  Pulmonary:      Effort: Pulmonary effort is normal. No respiratory distress.      Breath sounds: Normal breath sounds. No stridor. No wheezing, rhonchi " or rales.   Abdominal:      General: There is no distension.      Palpations: Abdomen is soft. There is no mass.      Tenderness: There is no abdominal tenderness. There is no guarding.      Hernia: No hernia is present.   Musculoskeletal:      Right lower leg: No edema.      Left lower leg: No edema.   Feet:      Right foot:      Skin integrity: No ulcer, skin breakdown, erythema, warmth, callus or dry skin.      Left foot:      Skin integrity: No ulcer, skin breakdown, erythema, warmth or dry skin.   Lymphadenopathy:      Cervical: No cervical adenopathy.   Skin:     General: Skin is warm.   Neurological:      Mental Status: He is alert and oriented to person, place, and time.   Psychiatric:         Mood and Affect: Mood normal.         Behavior: Behavior normal.

## 2024-07-09 DIAGNOSIS — N52.9 ERECTILE DYSFUNCTION, UNSPECIFIED ERECTILE DYSFUNCTION TYPE: ICD-10-CM

## 2024-07-09 NOTE — TELEPHONE ENCOUNTER
Reason for call: pt states pharmacy did not receive medication after PA was approved   [x] Refill   [] Prior Auth  [] Other:     Office:   [x] PCP/Provider -   [] Specialty/Provider -     Medication:         Does the patient have enough for 3 days?   [] Yes   [x] No - Send as HP to POD

## 2024-07-10 RX ORDER — AVANAFIL 50 MG/1
50 TABLET ORAL DAILY PRN
Qty: 18 TABLET | Refills: 3 | Status: SHIPPED | OUTPATIENT
Start: 2024-07-10 | End: 2024-07-21

## 2024-07-12 ENCOUNTER — APPOINTMENT (OUTPATIENT)
Dept: LAB | Facility: CLINIC | Age: 49
End: 2024-07-12
Payer: COMMERCIAL

## 2024-07-12 ENCOUNTER — TELEPHONE (OUTPATIENT)
Dept: ENDOCRINOLOGY | Facility: CLINIC | Age: 49
End: 2024-07-12

## 2024-07-12 DIAGNOSIS — E11.65 TYPE 2 DIABETES MELLITUS WITH HYPERGLYCEMIA, WITHOUT LONG-TERM CURRENT USE OF INSULIN (HCC): ICD-10-CM

## 2024-07-12 DIAGNOSIS — E78.2 MIXED HYPERLIPIDEMIA: ICD-10-CM

## 2024-07-12 DIAGNOSIS — E11.9 TYPE 2 DIABETES MELLITUS WITHOUT COMPLICATION, WITHOUT LONG-TERM CURRENT USE OF INSULIN (HCC): ICD-10-CM

## 2024-07-12 LAB
ALBUMIN SERPL BCG-MCNC: 4.2 G/DL (ref 3.5–5)
ALP SERPL-CCNC: 73 U/L (ref 34–104)
ALT SERPL W P-5'-P-CCNC: 16 U/L (ref 7–52)
ANION GAP SERPL CALCULATED.3IONS-SCNC: 9 MMOL/L (ref 4–13)
AST SERPL W P-5'-P-CCNC: 12 U/L (ref 13–39)
BILIRUB SERPL-MCNC: 0.44 MG/DL (ref 0.2–1)
BUN SERPL-MCNC: 11 MG/DL (ref 5–25)
CALCIUM SERPL-MCNC: 9.3 MG/DL (ref 8.4–10.2)
CHLORIDE SERPL-SCNC: 99 MMOL/L (ref 96–108)
CHOLEST SERPL-MCNC: 263 MG/DL
CO2 SERPL-SCNC: 28 MMOL/L (ref 21–32)
CREAT SERPL-MCNC: 0.81 MG/DL (ref 0.6–1.3)
CREAT UR-MCNC: 92.9 MG/DL
EST. AVERAGE GLUCOSE BLD GHB EST-MCNC: 298 MG/DL
GFR SERPL CREATININE-BSD FRML MDRD: 105 ML/MIN/1.73SQ M
GLUCOSE P FAST SERPL-MCNC: 298 MG/DL (ref 65–99)
HBA1C MFR BLD: 12 %
HDLC SERPL-MCNC: 56 MG/DL
LDLC SERPL CALC-MCNC: 143 MG/DL (ref 0–100)
MICROALBUMIN UR-MCNC: 11.1 MG/L
MICROALBUMIN/CREAT 24H UR: 12 MG/G CREATININE (ref 0–30)
NONHDLC SERPL-MCNC: 207 MG/DL
POTASSIUM SERPL-SCNC: 3.8 MMOL/L (ref 3.5–5.3)
PROT SERPL-MCNC: 7.3 G/DL (ref 6.4–8.4)
SODIUM SERPL-SCNC: 136 MMOL/L (ref 135–147)
TRIGL SERPL-MCNC: 318 MG/DL

## 2024-07-12 PROCEDURE — 36415 COLL VENOUS BLD VENIPUNCTURE: CPT

## 2024-07-12 PROCEDURE — 82043 UR ALBUMIN QUANTITATIVE: CPT | Performed by: FAMILY MEDICINE

## 2024-07-12 PROCEDURE — 80061 LIPID PANEL: CPT

## 2024-07-12 PROCEDURE — 82570 ASSAY OF URINE CREATININE: CPT | Performed by: FAMILY MEDICINE

## 2024-07-12 PROCEDURE — 83036 HEMOGLOBIN GLYCOSYLATED A1C: CPT

## 2024-07-12 PROCEDURE — 80053 COMPREHEN METABOLIC PANEL: CPT

## 2024-07-12 NOTE — TELEPHONE ENCOUNTER
----- Message from Christy JACK sent at 7/12/2024  9:54 AM EDT -----  Please schedule appt  ----- Message -----  From: PAULINE Talbot  Sent: 7/12/2024   9:44 AM EDT  To: #    Labs show poor control. Patient needs follow up appt to discuss  ----- Message -----  From: Lab, Background User  Sent: 7/12/2024   9:28 AM EDT  To: PAULINE Talbot

## 2024-07-12 NOTE — TELEPHONE ENCOUNTER
Lm to schedule appointment    ----- Message from Christy JCAK sent at 7/12/2024  9:54 AM EDT -----  Please schedule appt  ----- Message -----  From: PAULINE Talbot  Sent: 7/12/2024   9:44 AM EDT  To: #    Labs show poor control. Patient needs follow up appt to discuss  ----- Message -----  From: Lab, Background User  Sent: 7/12/2024   9:28 AM EDT  To: PAULINE Talbot

## 2024-07-15 ENCOUNTER — TELEPHONE (OUTPATIENT)
Dept: FAMILY MEDICINE CLINIC | Facility: CLINIC | Age: 49
End: 2024-07-15

## 2024-07-15 DIAGNOSIS — E78.2 MIXED HYPERLIPIDEMIA: ICD-10-CM

## 2024-07-15 RX ORDER — ATORVASTATIN CALCIUM 40 MG/1
40 TABLET, FILM COATED ORAL DAILY
Qty: 90 TABLET | Refills: 1 | Status: SHIPPED | OUTPATIENT
Start: 2024-07-15

## 2024-07-15 NOTE — TELEPHONE ENCOUNTER
Patient was called unable to reach a message was left to call the office.      ----- Message from Jelani Cobian MD sent at 7/15/2024  8:17 AM EDT -----  Please review labs with patient. Cholesterol is elevated. Recommend low cholesterol diet. The main dietary sources of cholesterol are oils and animal products.  Minimize cooking with oil and if he does, use olive or avocado oil.  Watch packaged food labels for added oils and saturated and trans fats.  Any animal products will contain cholesterol.  Favor poultry and fish over red meat, sausage, merritt, processed meats, etc.  Limit dairy, cheese, butter, eggs.  Regular exercise also helps with reducing cholesterol. Increase Lipitor from 20--> 40 mg. Kidney and liver function stable. Repeat cholesterol in 6 months along with CMP. Orders are in the chart. Thank you

## 2024-07-17 ENCOUNTER — TELEPHONE (OUTPATIENT)
Dept: FAMILY MEDICINE CLINIC | Facility: CLINIC | Age: 49
End: 2024-07-17

## 2024-07-17 NOTE — TELEPHONE ENCOUNTER
----- Message from Jelani Cobian MD sent at 7/15/2024  8:17 AM EDT -----  Please review labs with patient. Cholesterol is elevated. Recommend low cholesterol diet. The main dietary sources of cholesterol are oils and animal products.  Minimize cooking with oil and if he does, use olive or avocado oil.  Watch packaged food labels for added oils and saturated and trans fats.  Any animal products will contain cholesterol.  Favor poultry and fish over red meat, sausage, merritt, processed meats, etc.  Limit dairy, cheese, butter, eggs.  Regular exercise also helps with reducing cholesterol. Increase Lipitor from 20--> 40 mg. Kidney and liver function stable. Repeat cholesterol in 6 months along with CMP. Orders are in the chart. Thank you

## 2024-07-21 DIAGNOSIS — E11.65 TYPE 2 DIABETES MELLITUS WITH HYPERGLYCEMIA, WITHOUT LONG-TERM CURRENT USE OF INSULIN (HCC): Primary | ICD-10-CM

## 2024-07-21 DIAGNOSIS — N52.8 OTHER MALE ERECTILE DYSFUNCTION: ICD-10-CM

## 2024-07-21 DIAGNOSIS — N52.9 ERECTILE DYSFUNCTION, UNSPECIFIED ERECTILE DYSFUNCTION TYPE: ICD-10-CM

## 2024-07-21 RX ORDER — VARDENAFIL HYDROCHLORIDE 5 MG/1
5 TABLET ORAL DAILY PRN
Qty: 30 TABLET | Refills: 1 | Status: SHIPPED | OUTPATIENT
Start: 2024-07-21 | End: 2024-07-22 | Stop reason: SDUPTHER

## 2024-07-22 ENCOUNTER — TELEPHONE (OUTPATIENT)
Age: 49
End: 2024-07-22

## 2024-07-22 ENCOUNTER — HOSPITAL ENCOUNTER (EMERGENCY)
Facility: HOSPITAL | Age: 49
Discharge: HOME | End: 2024-07-22
Attending: EMERGENCY MEDICINE
Payer: COMMERCIAL

## 2024-07-22 VITALS
RESPIRATION RATE: 17 BRPM | DIASTOLIC BLOOD PRESSURE: 93 MMHG | OXYGEN SATURATION: 98 % | SYSTOLIC BLOOD PRESSURE: 128 MMHG | TEMPERATURE: 98.3 F | HEART RATE: 87 BPM

## 2024-07-22 DIAGNOSIS — E11.65 TYPE 2 DIABETES MELLITUS WITH HYPERGLYCEMIA, WITHOUT LONG-TERM CURRENT USE OF INSULIN (HCC): ICD-10-CM

## 2024-07-22 DIAGNOSIS — N52.8 OTHER MALE ERECTILE DYSFUNCTION: ICD-10-CM

## 2024-07-22 DIAGNOSIS — M79.10 MYALGIA: ICD-10-CM

## 2024-07-22 DIAGNOSIS — R53.83 FATIGUE: Primary | ICD-10-CM

## 2024-07-22 DIAGNOSIS — R11.0 NAUSEA: ICD-10-CM

## 2024-07-22 LAB
ALBUMIN SERPL BCG-MCNC: 4.3 G/DL (ref 3.5–5)
ALP SERPL-CCNC: 70 U/L (ref 34–104)
ALT SERPL W P-5'-P-CCNC: 18 U/L (ref 7–52)
ANION GAP SERPL CALCULATED.3IONS-SCNC: 7 MMOL/L (ref 4–13)
AST SERPL W P-5'-P-CCNC: 13 U/L (ref 13–39)
BASOPHILS # BLD AUTO: 0.06 THOUSANDS/ÂΜL (ref 0–0.1)
BASOPHILS NFR BLD AUTO: 1 % (ref 0–1)
BILIRUB SERPL-MCNC: 0.57 MG/DL (ref 0.2–1)
BUN SERPL-MCNC: 7 MG/DL (ref 5–25)
CALCIUM SERPL-MCNC: 9.7 MG/DL (ref 8.4–10.2)
CHLORIDE SERPL-SCNC: 102 MMOL/L (ref 96–108)
CO2 SERPL-SCNC: 28 MMOL/L (ref 21–32)
CREAT SERPL-MCNC: 0.7 MG/DL (ref 0.6–1.3)
EOSINOPHIL # BLD AUTO: 0.12 THOUSAND/ÂΜL (ref 0–0.61)
EOSINOPHIL NFR BLD AUTO: 2 % (ref 0–6)
ERYTHROCYTE [DISTWIDTH] IN BLOOD BY AUTOMATED COUNT: 12.4 % (ref 11.6–15.1)
FLUAV RNA RESP QL NAA+PROBE: NEGATIVE
FLUBV RNA RESP QL NAA+PROBE: NEGATIVE
GFR SERPL CREATININE-BSD FRML MDRD: 112 ML/MIN/1.73SQ M
GLUCOSE SERPL-MCNC: 184 MG/DL (ref 65–140)
HCT VFR BLD AUTO: 44.9 % (ref 36.5–49.3)
HGB BLD-MCNC: 16.1 G/DL (ref 12–17)
HOLD SPECIMEN: NORMAL
HOLD SPECIMEN: NORMAL
IMM GRANULOCYTES # BLD AUTO: 0.01 THOUSAND/UL (ref 0–0.2)
IMM GRANULOCYTES NFR BLD AUTO: 0 % (ref 0–2)
LIPASE SERPL-CCNC: 10 U/L (ref 11–82)
LYMPHOCYTES # BLD AUTO: 2.08 THOUSANDS/ÂΜL (ref 0.6–4.47)
LYMPHOCYTES NFR BLD AUTO: 35 % (ref 14–44)
MCH RBC QN AUTO: 32.3 PG (ref 26.8–34.3)
MCHC RBC AUTO-ENTMCNC: 35.9 G/DL (ref 31.4–37.4)
MCV RBC AUTO: 90 FL (ref 82–98)
MONOCYTES # BLD AUTO: 0.53 THOUSAND/ÂΜL (ref 0.17–1.22)
MONOCYTES NFR BLD AUTO: 9 % (ref 4–12)
NEUTROPHILS # BLD AUTO: 3.19 THOUSANDS/ÂΜL (ref 1.85–7.62)
NEUTS SEG NFR BLD AUTO: 53 % (ref 43–75)
NRBC BLD AUTO-RTO: 0 /100 WBCS
PLATELET # BLD AUTO: 277 THOUSANDS/UL (ref 149–390)
PMV BLD AUTO: 10.1 FL (ref 8.9–12.7)
POTASSIUM SERPL-SCNC: 3.9 MMOL/L (ref 3.5–5.3)
PROT SERPL-MCNC: 7.3 G/DL (ref 6.4–8.4)
RBC # BLD AUTO: 4.98 MILLION/UL (ref 3.88–5.62)
RSV RNA RESP QL NAA+PROBE: NEGATIVE
SARS-COV-2 RNA RESP QL NAA+PROBE: NEGATIVE
SODIUM SERPL-SCNC: 137 MMOL/L (ref 135–147)
WBC # BLD AUTO: 5.99 THOUSAND/UL (ref 4.31–10.16)

## 2024-07-22 PROCEDURE — 0241U HB NFCT DS VIR RESP RNA 4 TRGT: CPT | Performed by: EMERGENCY MEDICINE

## 2024-07-22 PROCEDURE — 80053 COMPREHEN METABOLIC PANEL: CPT | Performed by: EMERGENCY MEDICINE

## 2024-07-22 PROCEDURE — 99285 EMERGENCY DEPT VISIT HI MDM: CPT | Performed by: EMERGENCY MEDICINE

## 2024-07-22 PROCEDURE — 96360 HYDRATION IV INFUSION INIT: CPT

## 2024-07-22 PROCEDURE — 99283 EMERGENCY DEPT VISIT LOW MDM: CPT

## 2024-07-22 PROCEDURE — 93005 ELECTROCARDIOGRAM TRACING: CPT

## 2024-07-22 PROCEDURE — 36415 COLL VENOUS BLD VENIPUNCTURE: CPT

## 2024-07-22 PROCEDURE — 85025 COMPLETE CBC W/AUTO DIFF WBC: CPT | Performed by: EMERGENCY MEDICINE

## 2024-07-22 PROCEDURE — 83690 ASSAY OF LIPASE: CPT | Performed by: EMERGENCY MEDICINE

## 2024-07-22 RX ORDER — ONDANSETRON 2 MG/ML
4 INJECTION INTRAMUSCULAR; INTRAVENOUS ONCE AS NEEDED
Status: DISCONTINUED | OUTPATIENT
Start: 2024-07-22 | End: 2024-07-22 | Stop reason: HOSPADM

## 2024-07-22 RX ORDER — ACETAMINOPHEN 325 MG/1
975 TABLET ORAL ONCE
Status: COMPLETED | OUTPATIENT
Start: 2024-07-22 | End: 2024-07-22

## 2024-07-22 RX ORDER — VARDENAFIL HYDROCHLORIDE 5 MG/1
5 TABLET ORAL DAILY PRN
Qty: 6 TABLET | Refills: 1 | Status: SHIPPED | OUTPATIENT
Start: 2024-07-22

## 2024-07-22 RX ADMIN — SODIUM CHLORIDE 1000 ML: 0.9 INJECTION, SOLUTION INTRAVENOUS at 18:25

## 2024-07-22 RX ADMIN — ACETAMINOPHEN 975 MG: 325 TABLET, FILM COATED ORAL at 18:23

## 2024-07-22 NOTE — TELEPHONE ENCOUNTER
PA for Vardenafil (LEVITRA) 5 MG tablet    Submitted via    []CMM-KEY   [x][place]-Case ID # [id number]  []Faxed to plan   []Other website   []Phone call Case ID #     Office notes sent, clinical questions answered. Awaiting determination    Turnaround time for your insurance to make a decision on your Prior Authorization can take 7-21 business days.

## 2024-07-22 NOTE — Clinical Note
Charly Lugo was seen and treated in our emergency department on 7/22/2024.                Diagnosis:     Charly  may return to work on return date.    He may return on this date: 07/23/2024         If you have any questions or concerns, please don't hesitate to call.      William Mart MD    ______________________________           _______________          _______________  Hospital Representative                              Date                                Time

## 2024-07-22 NOTE — TELEPHONE ENCOUNTER
PA for Vardenafil (LEVITRA) 5 MG tablet Denied    Reason:(Screenshot if applicable)      Message sent to office clinical pool Yes    Denial letter scanned into Media Yes    Appeal started No     **Please follow up with your patient regarding denial and next steps**

## 2024-07-22 NOTE — ED PROVIDER NOTES
History  Chief Complaint   Patient presents with    Nausea     Pt presents with nausea, calf cramping and headache that began this am . Denies CP/SOB     (Charly Lugo) Charly Lugo is a 48 y.o. male     They presented to the emergency department on July 22, 2024. Patient presents with:  Nausea: Pt presents with nausea, calf cramping and headache that began this am . Denies CP/SOB  .    The patient states that he was on the way to to work today, began experiencing nausea as well as bilateral leg cramping in his calves as well as a posterior headache, and due to feeling unwell came to the emergency department for evaluation.  Patient states that he has had no visual changes, numbness, tingling, or focal weakness, however notes generalized fatigue throughout his body.  Patient denies any sick contacts that he is aware of.  Patient has not had any chest pain or difficulty breathing during this entire time. Patient denies fever, chills, back pain, change in bowel habits, change in urination, or any other complaint at this time.              Prior to Admission Medications   Prescriptions Last Dose Informant Patient Reported? Taking?   Blood Glucose Monitoring Suppl (OneTouch Verio) w/Device KIT  Self No No   Sig: Use to test 1-2x per day   Jardiance 25 MG TABS   No No   Sig: TAKE 1 TABLET BY MOUTH EVERY DAY IN THE MORNING   OneTouch Delica Lancets 33G MISC  Self No No   Sig: Use to test 1-2x daily   atorvastatin (LIPITOR) 40 mg tablet   No No   Sig: Take 1 tablet (40 mg total) by mouth daily   ciclopirox (Ciclodan) 8 % solution   No No   Sig: Apply topically daily at bedtime   dicyclomine (BENTYL) 20 mg tablet  Self No No   Sig: Take 1 tablet (20 mg total) by mouth every 6 (six) hours as needed (Abdominal cramping)   escitalopram (LEXAPRO) 10 mg tablet   No No   Sig: TAKE 1 TABLET BY MOUTH EVERY DAY   gabapentin (NEURONTIN) 300 mg capsule  Self No No   Sig: Take 1 capsule (300 mg total) by mouth daily at bedtime    glipiZIDE (GLUCOTROL XL) 10 mg 24 hr tablet   No No   Sig: TAKE 1 TABLET BY MOUTH EVERY DAY   glucose blood (OneTouch Verio) test strip   No No   Sig: Use to test 1-2x daily   ibuprofen (MOTRIN) 600 mg tablet   No No   Sig: Take 1 tablet (600 mg total) by mouth every 6 (six) hours as needed for mild pain   lisinopril (ZESTRIL) 10 mg tablet   No No   Sig: TAKE 1 TABLET BY MOUTH EVERY DAY   meloxicam (Mobic) 15 mg tablet   No No   Sig: Take 1 tablet (15 mg total) by mouth daily as needed for mild pain   metFORMIN (GLUCOPHAGE-XR) 500 mg 24 hr tablet   No No   Sig: Take 2 tablets (1,000 mg total) by mouth 2 (two) times a day with meals Do not start before July 15, 2024.   methocarbamol (ROBAXIN) 500 mg tablet   No No   Sig: Take 1 tablet (500 mg total) by mouth 2 (two) times a day   ondansetron (ZOFRAN) 4 mg tablet  Self No No   Sig: Take 1 tablet (4 mg total) by mouth every 8 (eight) hours as needed for nausea or vomiting   pantoprazole (PROTONIX) 40 mg tablet   No No   Sig: TAKE 1 TABLET BY MOUTH EVERY DAY   vardenafil (LEVITRA) 5 MG tablet   No No   Sig: Take 1 tablet (5 mg total) by mouth daily as needed for erectile dysfunction      Facility-Administered Medications: None       Past Medical History:   Diagnosis Date    Diabetes mellitus (HCC)     Hyperlipidemia        Past Surgical History:   Procedure Laterality Date    HERNIA REPAIR      as baby    MASS EXCISION Left 1/14/2020    Procedure: left hand mass excision;  Surgeon: Anatoly Borden MD;  Location: [facility];  Service: Orthopedics       Family History   Problem Relation Age of Onset    Hypertension Mother     Diabetes Father      I have reviewed and agree with the history as documented.    E-Cigarette/Vaping    E-Cigarette Use Former User      E-Cigarette/Vaping Substances    Nicotine No     THC No     CBD No     Flavoring No     Other No     Unknown No      Social History     Tobacco Use    Smoking status: Former     Current packs/day: 0.00      Average packs/day: 0.3 packs/day for 30.0 years (7.5 ttl pk-yrs)     Types: Cigarettes     Start date: 1992     Quit date: 2022     Years since quittin.6    Smokeless tobacco: Never    Tobacco comments:     1 cigarrette    Vaping Use    Vaping status: Former   Substance Use Topics    Alcohol use: Yes     Comment: Social    Drug use: No        Review of Systems   Constitutional:  Negative for chills and fever.   HENT:  Negative for ear pain and sore throat.    Eyes:  Negative for pain and visual disturbance.   Respiratory:  Negative for cough.    Cardiovascular:  Negative for chest pain and palpitations.   Gastrointestinal:  Positive for nausea. Negative for abdominal pain and vomiting.   Genitourinary:  Negative for dysuria and hematuria.   Musculoskeletal:  Positive for myalgias. Negative for arthralgias and back pain.   Skin:  Negative for color change and rash.   Neurological:  Negative for seizures and syncope.   All other systems reviewed and are negative.      Physical Exam  ED Triage Vitals [24 1259]   Temperature Pulse Respirations Blood Pressure SpO2   98.3 °F (36.8 °C) 92 17 140/90 97 %      Temp Source Heart Rate Source Patient Position - Orthostatic VS BP Location FiO2 (%)   Oral Monitor Sitting Right arm --      Pain Score       --             Orthostatic Vital Signs  Vitals:    24 1259 24 1817   BP: 140/90 128/93   Pulse: 92 87   Patient Position - Orthostatic VS: Sitting Lying       Physical Exam  Vitals and nursing note reviewed.   Constitutional:       General: He is not in acute distress.     Appearance: Normal appearance.   HENT:      Head: Normocephalic and atraumatic.      Right Ear: External ear normal.      Left Ear: External ear normal.      Nose: Nose normal.      Mouth/Throat:      Mouth: Mucous membranes are moist.   Eyes:      Conjunctiva/sclera: Conjunctivae normal.   Cardiovascular:      Rate and Rhythm: Normal rate and regular rhythm.   Pulmonary:       Effort: Pulmonary effort is normal. No respiratory distress.      Breath sounds: Normal breath sounds.   Abdominal:      General: Abdomen is flat. Bowel sounds are normal.      Tenderness: There is no abdominal tenderness. There is no guarding or rebound.   Musculoskeletal:         General: Normal range of motion.      Cervical back: Normal range of motion.   Skin:     General: Skin is warm and dry.      Capillary Refill: Capillary refill takes less than 2 seconds.   Neurological:      Mental Status: He is alert. Mental status is at baseline.   Psychiatric:         Mood and Affect: Mood normal.         ED Medications  Medications   sodium chloride 0.9 % bolus 1,000 mL (0 mL Intravenous Stopped 7/22/24 1925)   acetaminophen (TYLENOL) tablet 975 mg (975 mg Oral Given 7/22/24 1823)       Diagnostic Studies  Results Reviewed       Procedure Component Value Units Date/Time    FLU/RSV/COVID - if FLU/RSV clinically relevant [[id number]]  (Normal) Collected: 07/22/24 1914    Lab Status: Final result Specimen: Nares from Nose Updated: 07/22/24 1958     SARS-CoV-2 Negative     INFLUENZA A PCR Negative     INFLUENZA B PCR Negative     RSV PCR Negative    Narrative:      FOR PEDIATRIC PATIENTS - copy/paste COVID Guidelines URL to browser: [email or web address]    SARS-CoV-2 assay is a Nucleic Acid Amplification assay intended for the  qualitative detection of nucleic acid from SARS-CoV-2 in nasopharyngeal  swabs. Results are for the presumptive identification of SARS-CoV-2 RNA.    Positive results are indicative of infection with SARS-CoV-2, the virus  causing COVID-19, but do not rule out bacterial infection or co-infection  with other viruses. Laboratories within the [place] and its  territories are required to report all positive results to the appropriate  public health authorities. Negative results do not preclude SARS-CoV-2  infection and should not be used as the  sole basis for treatment or other  patient management decisions. Negative results must be combined with  clinical observations, patient history, and epidemiological information.  This test has not been FDA cleared or approved.    This test has been authorized by FDA under an Emergency Use Authorization  (EUA). This test is only authorized for the duration of time the  declaration that circumstances exist justifying the authorization of the  emergency use of an in vitro diagnostic tests for detection of SARS-CoV-2  virus and/or diagnosis of COVID-19 infection under section 564(b)(1) of  the Act, 21 U.S.C. 360bbb-3(b)(1), unless the authorization is terminated  or revoked sooner. The test has been validated but independent review by FDA  and CLIA is pending.    Test performed using [employer]: This RT-PCR assay targets N2,  a region unique to SARS-CoV-2. A conserved region in the E-gene was chosen  for pan-Sarbecovirus detection which includes SARS-CoV-2.    According to CMS-2020-01-R, this platform meets the definition of high-throughput technology.    Comprehensive metabolic panel [[id number]]  (Abnormal) Collected: 07/22/24 1303    Lab Status: Final result Specimen: Blood from Arm, Left Updated: 07/22/24 1802     Sodium 137 mmol/L      Potassium 3.9 mmol/L      Chloride 102 mmol/L      CO2 28 mmol/L      ANION GAP 7 mmol/L      BUN 7 mg/dL      Creatinine 0.70 mg/dL      Glucose 184 mg/dL      Calcium 9.7 mg/dL      AST 13 U/L      ALT 18 U/L      Alkaline Phosphatase 70 U/L      Total Protein 7.3 g/dL      Albumin 4.3 g/dL      Total Bilirubin 0.57 mg/dL      eGFR 112 ml/min/1.73sq m     Narrative:      National Kidney Disease Foundation guidelines for Chronic Kidney Disease (CKD):     Stage 1 with normal or high GFR (GFR > 90 mL/min/1.73 square meters)    Stage 2 Mild CKD (GFR = 60-89 mL/min/1.73 square meters)    Stage 3A Moderate CKD (GFR = 45-59 mL/min/1.73 square meters)    Stage 3B Moderate CKD (GFR =  30-44 mL/min/1.73 square meters)    Stage 4 Severe CKD (GFR = 15-29 mL/min/1.73 square meters)    Stage 5 End Stage CKD (GFR <15 mL/min/1.73 square meters)  Note: GFR calculation is accurate only with a steady state creatinine    Lipase [[id number]]  (Abnormal) Collected: 07/22/24 1303    Lab Status: Final result Specimen: Blood from Arm, Left Updated: 07/22/24 1802     Lipase 10 u/L     CBC and differential [[id number]] Collected: 07/22/24 1303    Lab Status: Final result Specimen: Blood from Arm, Left Updated: 07/22/24 1751     WBC 5.99 Thousand/uL      RBC 4.98 Million/uL      Hemoglobin 16.1 g/dL      Hematocrit 44.9 %      MCV 90 fL      MCH 32.3 pg      MCHC 35.9 g/dL      RDW 12.4 %      MPV 10.1 fL      Platelets 277 Thousands/uL      nRBC 0 /100 WBCs      Segmented % 53 %      Immature Grans % 0 %      Lymphocytes % 35 %      Monocytes % 9 %      Eosinophils Relative 2 %      Basophils Relative 1 %      Absolute Neutrophils 3.19 Thousands/µL      Absolute Immature Grans 0.01 Thousand/uL      Absolute Lymphocytes 2.08 Thousands/µL      Absolute Monocytes 0.53 Thousand/µL      Eosinophils Absolute 0.12 Thousand/µL      Basophils Absolute 0.06 Thousands/µL     [place] draw [[id number]] Collected: 07/22/24 1303    Lab Status: Final result Specimen: Blood from Arm, Left Updated: 07/22/24 1344    Narrative:      The following orders were created for panel order [place] draw.  Procedure                               Abnormality         Status                     ---------                               -----------         ------                     Green / Yellow tube on hold[[id number]]                      Final result               Lavender Top 3 ml on hold[[id number]]                        Final result                 Please view results for these tests on the individual orders.                   No orders to display         Procedures  ECG 12 Lead Documentation Only    Date/Time: 7/22/2024 7:08 PM    Performed by:  William Mart MD  Authorized by: William Mart MD    ECG reviewed by me, the ED Provider: yes    Patient location:  ED  Previous ECG:     Previous ECG:  Compared to current    Similarity:  No change  Interpretation:     Interpretation: normal    Rate:     ECG rate:  75    ECG rate assessment: normal    Rhythm:     Rhythm: sinus rhythm    Ectopy:     Ectopy: none    QRS:     QRS axis:  Normal    QRS intervals:  Normal  Conduction:     Conduction: normal    ST segments:     ST segments:  Normal  T waves:     T waves: normal    Comments:      Normal Sinus rhythm at 75 BPM, no PAC, no PVC, no acute ischemic changes.        ED Course  ED Course as of 07/22/24 2253   Mon Jul 22, 2024   1752 CBC and differential  Within normal limits, no evidence of leukocytosis, anemia, thrombocytopenia, no cell line activation.                                       Medical Decision Making  48-year-old male presents emergency department with complaints of nausea, headache, and bilateral calf cramping.  Differential diagnosis includes but is not limited to COVID-19, influenza, dehydration, arrhythmia.  EKG as documented by myself as above.  Laboratory analysis without acute pertinent findings.  Patient provided with Tylenol as well as IV fluid hydration with significant improvement of symptoms.  Discussed these findings with patient. Patient appears well, nontoxic, agrees with plan of care at this time.  Answered all questions.  In light of this, patient would benefit from outpatient follow-up.    Amount and/or Complexity of Data Reviewed  Labs: ordered. Decision-making details documented in ED Course.    Risk  OTC drugs.  Prescription drug management.          Disposition  Final diagnoses:   Fatigue   Nausea   Myalgia     Time reflects when diagnosis was documented in both MDM as applicable and the Disposition within this note       Time User Action Codes Description Comment    7/22/2024  7:46 PM William Mart  [R53.83] Fatigue     7/22/2024  7:46 PM William Mart Add [R11.0] Nausea     7/22/2024  7:46 PM William Mart Add [M79.10] Myalgia           ED Disposition       ED Disposition   Discharge    Condition   Stable    Date/Time   Mon Jul 22, 2024  8:00 PM    Comment   Charly Lugo discharge to home/self care.                   Follow-up Information    None         Discharge Medication List as of 7/22/2024  8:02 PM        START taking these medications    Details   vardenafil (LEVITRA) 5 MG tablet Take 1 tablet (5 mg total) by mouth daily as needed for erectile dysfunction, Starting Mon 7/22/2024, Normal           CONTINUE these medications which have NOT CHANGED    Details   atorvastatin (LIPITOR) 40 mg tablet Take 1 tablet (40 mg total) by mouth daily, Starting Mon 7/15/2024, Normal      Blood Glucose Monitoring Suppl (OneTouch Verio) w/Device KIT Use to test 1-2x per day, Normal      ciclopirox (Ciclodan) 8 % solution Apply topically daily at bedtime, Starting Mon 7/8/2024, Until Fri 9/6/2024, Normal      dicyclomine (BENTYL) 20 mg tablet Take 1 tablet (20 mg total) by mouth every 6 (six) hours as needed (Abdominal cramping), Starting Wed 6/28/2023, Normal      escitalopram (LEXAPRO) 10 mg tablet TAKE 1 TABLET BY MOUTH EVERY DAY, Starting Mon 2/12/2024, Normal      gabapentin (NEURONTIN) 300 mg capsule Take 1 capsule (300 mg total) by mouth daily at bedtime, Starting Thu 5/25/2023, Normal      glipiZIDE (GLUCOTROL XL) 10 mg 24 hr tablet TAKE 1 TABLET BY MOUTH EVERY DAY, Starting Mon 2/12/2024, Normal      glucose blood (OneTouch Verio) test strip Use to test 1-2x daily, Normal      ibuprofen (MOTRIN) 600 mg tablet Take 1 tablet (600 mg total) by mouth every 6 (six) hours as needed for mild pain, Starting Mon 9/25/2023, Normal      Jardiance 25 MG TABS TAKE 1 TABLET BY MOUTH EVERY DAY IN THE MORNING, Normal      lisinopril (ZESTRIL) 10 mg tablet TAKE 1 TABLET BY MOUTH EVERY DAY, Starting Mon 2/12/2024,  Normal      meloxicam (Mobic) 15 mg tablet Take 1 tablet (15 mg total) by mouth daily as needed for mild pain, Starting Fri 9/29/2023, Normal      metFORMIN (GLUCOPHAGE-XR) 500 mg 24 hr tablet Take 2 tablets (1,000 mg total) by mouth 2 (two) times a day with meals Do not start before July 15, 2024., Starting Mon 7/15/2024, Normal      methocarbamol (ROBAXIN) 500 mg tablet Take 1 tablet (500 mg total) by mouth 2 (two) times a day, Starting Mon 9/25/2023, Normal      ondansetron (ZOFRAN) 4 mg tablet Take 1 tablet (4 mg total) by mouth every 8 (eight) hours as needed for nausea or vomiting, Starting Fri 6/9/2023, Normal      OneTouch Delica Lancets 33G MISC Use to test 1-2x daily, Normal      pantoprazole (PROTONIX) 40 mg tablet TAKE 1 TABLET BY MOUTH EVERY DAY, Starting Tue 10/24/2023, Normal           No discharge procedures on file.    PDMP Review         Value Time User    PDMP Reviewed  Yes 1/14/2020 12:17 PM Anatoly Borden MD             ED Provider  Attending physically available and evaluated Charyl RAMIREZ Parish. I managed the patient along with the ED Attending.    Electronically Signed by           William Mart MD  07/22/24 2727

## 2024-07-22 NOTE — ED PROCEDURE NOTE
PROCEDURE  ECG 12 Lead Documentation Only    Date/Time: 7/22/2024 6:38 PM    Performed by: David Howard MD  Authorized by: David Howard MD    Indications / Diagnosis:  Gen weakness  ECG reviewed by me, the ED Provider: yes    Patient location:  ED and bedside  Previous ECG:     Previous ECG:  Unavailable    Comparison to cardiac monitor: Yes    Interpretation:     Interpretation: normal    Rate:     ECG rate:  75    ECG rate assessment: normal    Rhythm:     Rhythm: sinus rhythm    Ectopy:     Ectopy: none    QRS:     QRS axis:  Normal    QRS intervals:  Normal  Conduction:     Conduction: normal    ST segments:     ST segments:  Normal  T waves:     T waves: flattening      Flattening:  III and V1  Q waves:     Q waves:  V1  Other findings:     Other findings: early repolarization    Comments:      No ecg signs of ischemia/ injury / r heart strain / rikki/pericarditis        David Howard MD  07/22/24 8534

## 2024-07-23 LAB
ATRIAL RATE: 75 BPM
P AXIS: 69 DEGREES
PR INTERVAL: 140 MS
QRS AXIS: 47 DEGREES
QRSD INTERVAL: 86 MS
QT INTERVAL: 376 MS
QTC INTERVAL: 419 MS
T WAVE AXIS: 10 DEGREES
VENTRICULAR RATE: 75 BPM

## 2024-07-23 PROCEDURE — 93010 ELECTROCARDIOGRAM REPORT: CPT | Performed by: INTERNAL MEDICINE

## 2024-07-26 NOTE — ED ATTENDING ATTESTATION
7/22/2024  IDavid MD, saw and evaluated the patient. I have discussed the patient with the resident/non-physician practitioner and agree with the resident's/non-physician practitioner's findings, Plan of Care, and MDM as documented in the resident's/non-physician practitioner's note, except where noted. All available labs and Radiology studies were reviewed.  I was present for key portions of any procedure(s) performed by the resident/non-physician practitioner and I was immediately available to provide assistance.       At this point I agree with the current assessment done in the Emergency Department.  I have conducted an independent evaluation of this patient a history and physical is as follows:see h and p above     ED Course  ED Course as of 07/26/24 1325   Mon Jul 22, 2024   1846 Er md note- pt seen and thoroughly evaluated by er md - case d/w er resident-  48 yr male - poorly controlled niddm-   c/o generally not feeling well today - no fevers- no c/sob/- no v/d- no melena/brpbpr - does nto check bs -- - avss- pulse ox 97 % on ra- interpretation is normal- no intervention -  s1/s2- cta-/soft nt/nd- equal bilateral radial/dp pulses- no ble edema/calf tenderness/asym/ erythema- normal non focal neuro exam          Critical Care Time  Procedures

## 2024-08-02 ENCOUNTER — TELEPHONE (OUTPATIENT)
Dept: FAMILY MEDICINE CLINIC | Facility: CLINIC | Age: 49
End: 2024-08-02

## 2024-08-02 NOTE — TELEPHONE ENCOUNTER
Patient was called and made aware results.      ----- Message from Jelani Cobian MD sent at 7/15/2024  8:17 AM EDT -----  Please review labs with patient. Cholesterol is elevated. Recommend low cholesterol diet. The main dietary sources of cholesterol are oils and animal products.  Minimize cooking with oil and if he does, use olive or avocado oil.  Watch packaged food labels for added oils and saturated and trans fats.  Any animal products will contain cholesterol.  Favor poultry and fish over red meat, sausage, merritt, processed meats, etc.  Limit dairy, cheese, butter, eggs.  Regular exercise also helps with reducing cholesterol. Increase Lipitor from 20--> 40 mg. Kidney and liver function stable. Repeat cholesterol in 6 months along with CMP. Orders are in the chart. Thank you

## 2024-10-15 DIAGNOSIS — E11.65 TYPE 2 DIABETES MELLITUS WITH HYPERGLYCEMIA, WITHOUT LONG-TERM CURRENT USE OF INSULIN (HCC): ICD-10-CM

## 2024-10-16 RX ORDER — METFORMIN HYDROCHLORIDE 500 MG/1
TABLET, EXTENDED RELEASE ORAL
Qty: 60 TABLET | Refills: 5 | Status: SHIPPED | OUTPATIENT
Start: 2024-10-16

## 2024-11-03 DIAGNOSIS — I10 PRIMARY HYPERTENSION: ICD-10-CM

## 2024-11-03 DIAGNOSIS — F33.41 RECURRENT MAJOR DEPRESSIVE DISORDER, IN PARTIAL REMISSION (HCC): ICD-10-CM

## 2024-11-03 DIAGNOSIS — E11.65 TYPE 2 DIABETES MELLITUS WITH HYPERGLYCEMIA, WITHOUT LONG-TERM CURRENT USE OF INSULIN (HCC): ICD-10-CM

## 2024-11-03 RX ORDER — GLIPIZIDE 10 MG/1
10 TABLET, FILM COATED, EXTENDED RELEASE ORAL DAILY
Qty: 90 TABLET | Refills: 1 | Status: SHIPPED | OUTPATIENT
Start: 2024-11-03

## 2024-11-03 RX ORDER — ESCITALOPRAM OXALATE 10 MG/1
10 TABLET ORAL DAILY
Qty: 90 TABLET | Refills: 1 | Status: SHIPPED | OUTPATIENT
Start: 2024-11-03

## 2024-11-03 RX ORDER — LISINOPRIL 10 MG/1
10 TABLET ORAL DAILY
Qty: 90 TABLET | Refills: 1 | Status: SHIPPED | OUTPATIENT
Start: 2024-11-03

## 2024-11-17 DIAGNOSIS — N52.8 OTHER MALE ERECTILE DYSFUNCTION: ICD-10-CM

## 2024-11-17 DIAGNOSIS — E11.65 TYPE 2 DIABETES MELLITUS WITH HYPERGLYCEMIA, WITHOUT LONG-TERM CURRENT USE OF INSULIN (HCC): ICD-10-CM

## 2024-11-19 RX ORDER — VARDENAFIL HYDROCHLORIDE 5 MG/1
TABLET ORAL
Qty: 6 TABLET | Refills: 1 | Status: SHIPPED | OUTPATIENT
Start: 2024-11-19

## 2025-01-05 DIAGNOSIS — E78.2 MIXED HYPERLIPIDEMIA: ICD-10-CM

## 2025-01-05 RX ORDER — ATORVASTATIN CALCIUM 40 MG/1
40 TABLET, FILM COATED ORAL DAILY
Qty: 90 TABLET | Refills: 1 | Status: SHIPPED | OUTPATIENT
Start: 2025-01-05

## 2025-05-11 DIAGNOSIS — I10 PRIMARY HYPERTENSION: ICD-10-CM

## 2025-05-11 DIAGNOSIS — E11.65 TYPE 2 DIABETES MELLITUS WITH HYPERGLYCEMIA, WITHOUT LONG-TERM CURRENT USE OF INSULIN (HCC): ICD-10-CM

## 2025-05-11 DIAGNOSIS — F33.41 RECURRENT MAJOR DEPRESSIVE DISORDER, IN PARTIAL REMISSION (HCC): ICD-10-CM

## 2025-05-11 RX ORDER — LISINOPRIL 10 MG/1
10 TABLET ORAL DAILY
Qty: 30 TABLET | Refills: 0 | Status: SHIPPED | OUTPATIENT
Start: 2025-05-11

## 2025-05-11 RX ORDER — GLIPIZIDE 10 MG/1
10 TABLET, FILM COATED, EXTENDED RELEASE ORAL DAILY
Qty: 30 TABLET | Refills: 0 | Status: SHIPPED | OUTPATIENT
Start: 2025-05-11

## 2025-05-11 RX ORDER — ESCITALOPRAM OXALATE 10 MG/1
10 TABLET ORAL DAILY
Qty: 30 TABLET | Refills: 0 | Status: SHIPPED | OUTPATIENT
Start: 2025-05-11

## 2025-05-20 ENCOUNTER — APPOINTMENT (OUTPATIENT)
Dept: LAB | Facility: CLINIC | Age: 50
End: 2025-05-20
Attending: INTERNAL MEDICINE
Payer: COMMERCIAL

## 2025-05-20 ENCOUNTER — RESULTS FOLLOW-UP (OUTPATIENT)
Dept: FAMILY MEDICINE CLINIC | Facility: CLINIC | Age: 50
End: 2025-05-20

## 2025-05-20 DIAGNOSIS — E55.9 VITAMIN D DEFICIENCY: ICD-10-CM

## 2025-05-20 DIAGNOSIS — E11.65 INADEQUATELY CONTROLLED DIABETES MELLITUS (HCC): ICD-10-CM

## 2025-05-20 DIAGNOSIS — E78.2 MIXED HYPERLIPIDEMIA: ICD-10-CM

## 2025-05-20 LAB
25(OH)D3 SERPL-MCNC: 15.1 NG/ML (ref 30–100)
ALBUMIN SERPL BCG-MCNC: 3.9 G/DL (ref 3.5–5)
ALP SERPL-CCNC: 68 U/L (ref 34–104)
ALT SERPL W P-5'-P-CCNC: 18 U/L (ref 7–52)
ANION GAP SERPL CALCULATED.3IONS-SCNC: 7 MMOL/L (ref 4–13)
AST SERPL W P-5'-P-CCNC: 15 U/L (ref 13–39)
BASOPHILS # BLD AUTO: 0.06 THOUSANDS/ÂΜL (ref 0–0.1)
BASOPHILS NFR BLD AUTO: 1 % (ref 0–1)
BILIRUB SERPL-MCNC: 0.26 MG/DL (ref 0.2–1)
BUN SERPL-MCNC: 11 MG/DL (ref 5–25)
CALCIUM SERPL-MCNC: 9 MG/DL (ref 8.4–10.2)
CHLORIDE SERPL-SCNC: 102 MMOL/L (ref 96–108)
CHOLEST SERPL-MCNC: 246 MG/DL (ref ?–200)
CO2 SERPL-SCNC: 27 MMOL/L (ref 21–32)
CREAT SERPL-MCNC: 0.65 MG/DL (ref 0.6–1.3)
CREAT UR-MCNC: 112.5 MG/DL
EOSINOPHIL # BLD AUTO: 0.1 THOUSAND/ÂΜL (ref 0–0.61)
EOSINOPHIL NFR BLD AUTO: 2 % (ref 0–6)
ERYTHROCYTE [DISTWIDTH] IN BLOOD BY AUTOMATED COUNT: 11.6 % (ref 11.6–15.1)
EST. AVERAGE GLUCOSE BLD GHB EST-MCNC: 315 MG/DL
GFR SERPL CREATININE-BSD FRML MDRD: 115 ML/MIN/1.73SQ M
GLUCOSE P FAST SERPL-MCNC: 287 MG/DL (ref 65–99)
HBA1C MFR BLD: 12.6 %
HCT VFR BLD AUTO: 42.1 % (ref 36.5–49.3)
HDLC SERPL-MCNC: 51 MG/DL
HGB BLD-MCNC: 14.8 G/DL (ref 12–17)
IMM GRANULOCYTES # BLD AUTO: 0.01 THOUSAND/UL (ref 0–0.2)
IMM GRANULOCYTES NFR BLD AUTO: 0 % (ref 0–2)
LDLC SERPL CALC-MCNC: 142 MG/DL (ref 0–100)
LYMPHOCYTES # BLD AUTO: 1.91 THOUSANDS/ÂΜL (ref 0.6–4.47)
LYMPHOCYTES NFR BLD AUTO: 37 % (ref 14–44)
MCH RBC QN AUTO: 31.8 PG (ref 26.8–34.3)
MCHC RBC AUTO-ENTMCNC: 35.2 G/DL (ref 31.4–37.4)
MCV RBC AUTO: 91 FL (ref 82–98)
MICROALBUMIN UR-MCNC: 23.4 MG/L
MICROALBUMIN/CREAT 24H UR: 21 MG/G CREATININE (ref 0–30)
MONOCYTES # BLD AUTO: 0.48 THOUSAND/ÂΜL (ref 0.17–1.22)
MONOCYTES NFR BLD AUTO: 9 % (ref 4–12)
NEUTROPHILS # BLD AUTO: 2.68 THOUSANDS/ÂΜL (ref 1.85–7.62)
NEUTS SEG NFR BLD AUTO: 51 % (ref 43–75)
NONHDLC SERPL-MCNC: 195 MG/DL
NRBC BLD AUTO-RTO: 0 /100 WBCS
PLATELET # BLD AUTO: 268 THOUSANDS/UL (ref 149–390)
PMV BLD AUTO: 10.3 FL (ref 8.9–12.7)
POTASSIUM SERPL-SCNC: 3.7 MMOL/L (ref 3.5–5.3)
PROT SERPL-MCNC: 6.9 G/DL (ref 6.4–8.4)
RBC # BLD AUTO: 4.65 MILLION/UL (ref 3.88–5.62)
SODIUM SERPL-SCNC: 136 MMOL/L (ref 135–147)
TRIGL SERPL-MCNC: 263 MG/DL (ref ?–150)
VIT B12 SERPL-MCNC: 698 PG/ML (ref 180–914)
WBC # BLD AUTO: 5.24 THOUSAND/UL (ref 4.31–10.16)

## 2025-05-20 PROCEDURE — 82043 UR ALBUMIN QUANTITATIVE: CPT

## 2025-05-20 PROCEDURE — 82570 ASSAY OF URINE CREATININE: CPT

## 2025-05-20 PROCEDURE — 85025 COMPLETE CBC W/AUTO DIFF WBC: CPT

## 2025-05-20 PROCEDURE — 80053 COMPREHEN METABOLIC PANEL: CPT

## 2025-05-20 PROCEDURE — 83036 HEMOGLOBIN GLYCOSYLATED A1C: CPT

## 2025-05-20 PROCEDURE — 82607 VITAMIN B-12: CPT

## 2025-05-20 PROCEDURE — 36415 COLL VENOUS BLD VENIPUNCTURE: CPT

## 2025-05-20 PROCEDURE — 82306 VITAMIN D 25 HYDROXY: CPT

## 2025-05-20 PROCEDURE — 80061 LIPID PANEL: CPT

## 2025-05-30 DIAGNOSIS — E11.65 TYPE 2 DIABETES MELLITUS WITH HYPERGLYCEMIA, WITHOUT LONG-TERM CURRENT USE OF INSULIN (HCC): ICD-10-CM

## 2025-05-30 DIAGNOSIS — N52.8 OTHER MALE ERECTILE DYSFUNCTION: ICD-10-CM

## 2025-06-01 RX ORDER — VARDENAFIL HYDROCHLORIDE 5 MG/1
TABLET ORAL
Qty: 6 TABLET | Refills: 1 | Status: SHIPPED | OUTPATIENT
Start: 2025-06-01

## 2025-06-02 NOTE — TELEPHONE ENCOUNTER
----- Message from Jelani Cobian MD sent at 5/20/2025 10:26 PM EDT -----  Please call patient regarding labs.  I did see that he established with a provider at [facility] so please confirm if he will continue to see us.  Regardless, there is a summary of his labs.  A1c is 12.6   which is well above goal.  For his age would like his A1c to be less than 6.5.  Vitamin D was low measuring 15.1.  Would recommend high-dose vitamin D weekly for 8 weeks.  Kidney function liver   function stable. Electrolytes within normal range.  Cholesterol elevated but has improved a bit since last year.  B12 within normal range.  Blood count does not show any anemia or immunodeficiency.    Urine protein levels acceptable.    Thank you  ----- Message -----  From: Lab, Background User  Sent: 5/20/2025   7:12 AM EDT  To: Jelani Cobian MD

## 2025-06-07 DIAGNOSIS — I10 PRIMARY HYPERTENSION: ICD-10-CM

## 2025-06-07 DIAGNOSIS — E11.65 TYPE 2 DIABETES MELLITUS WITH HYPERGLYCEMIA, WITHOUT LONG-TERM CURRENT USE OF INSULIN (HCC): ICD-10-CM

## 2025-06-07 DIAGNOSIS — F33.41 RECURRENT MAJOR DEPRESSIVE DISORDER, IN PARTIAL REMISSION (HCC): ICD-10-CM

## 2025-06-08 RX ORDER — LISINOPRIL 10 MG/1
10 TABLET ORAL DAILY
Qty: 30 TABLET | Refills: 0 | Status: SHIPPED | OUTPATIENT
Start: 2025-06-08

## 2025-06-08 RX ORDER — ESCITALOPRAM OXALATE 10 MG/1
10 TABLET ORAL DAILY
Qty: 30 TABLET | Refills: 0 | Status: SHIPPED | OUTPATIENT
Start: 2025-06-08

## 2025-06-08 RX ORDER — GLIPIZIDE 10 MG/1
10 TABLET, FILM COATED, EXTENDED RELEASE ORAL DAILY
Qty: 30 TABLET | Refills: 0 | Status: SHIPPED | OUTPATIENT
Start: 2025-06-08

## 2025-06-22 DIAGNOSIS — E11.65 TYPE 2 DIABETES MELLITUS WITH HYPERGLYCEMIA, WITHOUT LONG-TERM CURRENT USE OF INSULIN (HCC): ICD-10-CM

## 2025-06-22 RX ORDER — METFORMIN HYDROCHLORIDE 500 MG/1
TABLET, EXTENDED RELEASE ORAL
Qty: 180 TABLET | Refills: 0 | Status: SHIPPED | OUTPATIENT
Start: 2025-06-22

## 2025-06-30 ENCOUNTER — DOCUMENTATION (OUTPATIENT)
Dept: ADMINISTRATIVE | Facility: OTHER | Age: 50
End: 2025-06-30

## 2025-06-30 NOTE — PROGRESS NOTES
06/30/25 2:46 PM    Patient was called to schedule a diabetic follow up apointment ; a message was left for the patient to return the call.    Thank you.  Evelina Adair MA  [facility]

## 2025-07-13 DIAGNOSIS — F33.41 RECURRENT MAJOR DEPRESSIVE DISORDER, IN PARTIAL REMISSION (HCC): ICD-10-CM

## 2025-07-13 DIAGNOSIS — E11.65 TYPE 2 DIABETES MELLITUS WITH HYPERGLYCEMIA, WITHOUT LONG-TERM CURRENT USE OF INSULIN (HCC): ICD-10-CM

## 2025-07-13 DIAGNOSIS — I10 PRIMARY HYPERTENSION: ICD-10-CM

## 2025-07-15 RX ORDER — GLIPIZIDE 10 MG/1
10 TABLET, FILM COATED, EXTENDED RELEASE ORAL DAILY
Qty: 30 TABLET | Refills: 0 | OUTPATIENT
Start: 2025-07-15

## 2025-07-15 RX ORDER — ESCITALOPRAM OXALATE 10 MG/1
10 TABLET ORAL DAILY
Qty: 30 TABLET | Refills: 0 | OUTPATIENT
Start: 2025-07-15

## 2025-07-15 RX ORDER — LISINOPRIL 10 MG/1
10 TABLET ORAL DAILY
Qty: 30 TABLET | Refills: 0 | OUTPATIENT
Start: 2025-07-15

## 2025-07-16 DIAGNOSIS — E78.2 MIXED HYPERLIPIDEMIA: ICD-10-CM

## 2025-07-17 RX ORDER — ATORVASTATIN CALCIUM 40 MG/1
40 TABLET, FILM COATED ORAL DAILY
Qty: 90 TABLET | Refills: 1 | OUTPATIENT
Start: 2025-07-17

## 2025-08-12 ENCOUNTER — DOCUMENTATION (OUTPATIENT)
Dept: ADMINISTRATIVE | Facility: OTHER | Age: 50
End: 2025-08-12

## (undated) DEVICE — CHLORAPREP HI-LITE 26ML ORANGE

## (undated) DEVICE — SPONGE PVP SCRUB WING STERILE

## (undated) DEVICE — DISPOSABLE EQUIPMENT COVER: Brand: SMALL TOWEL DRAPE

## (undated) DEVICE — INTENDED FOR TISSUE SEPARATION, AND OTHER PROCEDURES THAT REQUIRE A SHARP SURGICAL BLADE TO PUNCTURE OR CUT.: Brand: BARD-PARKER SAFETY BLADES SIZE 15, STERILE

## (undated) DEVICE — ACE WRAP 3 IN VELCRO LATEX FREE

## (undated) DEVICE — GLOVE INDICATOR PI UNDERGLOVE SZ 8 BLUE

## (undated) DEVICE — GLOVE SRG BIOGEL 7.5

## (undated) DEVICE — OCCLUSIVE GAUZE STRIP,3% BISMUTH TRIBROMOPHENATE IN PETROLATUM BLEND: Brand: XEROFORM

## (undated) DEVICE — STERILE BETHLEHEM PLASTIC HAND: Brand: CARDINAL HEALTH

## (undated) DEVICE — ALUMI-HAND POSITIONER XLG

## (undated) DEVICE — PADDING CAST 4 IN  COTTON STRL

## (undated) DEVICE — 3M™ STERI-STRIP™ REINFORCED ADHESIVE SKIN CLOSURES, R1547, 1/2 IN X 4 IN (12 MM X 100 MM), 6 STRIPS/ENVELOPE: Brand: 3M™ STERI-STRIP™

## (undated) DEVICE — NEEDLE 25G X 1 1/2

## (undated) DEVICE — SUT PROLENE 4-0 PS-2 18 IN 8682G

## (undated) DEVICE — GAUZE SPONGES,16 PLY: Brand: CURITY